# Patient Record
Sex: FEMALE | Race: WHITE | NOT HISPANIC OR LATINO | Employment: OTHER | URBAN - METROPOLITAN AREA
[De-identification: names, ages, dates, MRNs, and addresses within clinical notes are randomized per-mention and may not be internally consistent; named-entity substitution may affect disease eponyms.]

---

## 2017-02-17 ENCOUNTER — OFFICE VISIT (OUTPATIENT)
Dept: ORTHOPEDICS | Facility: CLINIC | Age: 82
End: 2017-02-17
Payer: MEDICARE

## 2017-02-17 ENCOUNTER — TELEPHONE (OUTPATIENT)
Dept: ORTHOPEDICS | Facility: CLINIC | Age: 82
End: 2017-02-17

## 2017-02-17 ENCOUNTER — HOSPITAL ENCOUNTER (OUTPATIENT)
Dept: RADIOLOGY | Facility: HOSPITAL | Age: 82
Discharge: HOME | End: 2017-02-17
Attending: ORTHOPAEDIC SURGERY
Payer: MEDICARE

## 2017-02-17 VITALS
SYSTOLIC BLOOD PRESSURE: 133 MMHG | HEART RATE: 63 BPM | HEIGHT: 59 IN | BODY MASS INDEX: 36.08 KG/M2 | DIASTOLIC BLOOD PRESSURE: 59 MMHG | WEIGHT: 179 LBS

## 2017-02-17 DIAGNOSIS — T84.84XA PAINFUL TOTAL KNEE REPLACEMENT, INITIAL ENCOUNTER: Primary | ICD-10-CM

## 2017-02-17 DIAGNOSIS — M25.562 LEFT KNEE PAIN, UNSPECIFIED CHRONICITY: Primary | ICD-10-CM

## 2017-02-17 DIAGNOSIS — Z96.659 PAINFUL TOTAL KNEE REPLACEMENT, INITIAL ENCOUNTER: Primary | ICD-10-CM

## 2017-02-17 DIAGNOSIS — M25.562 LEFT KNEE PAIN, UNSPECIFIED CHRONICITY: ICD-10-CM

## 2017-02-17 PROCEDURE — 73564 X-RAY EXAM KNEE 4 OR MORE: CPT | Mod: 26,LT,, | Performed by: RADIOLOGY

## 2017-02-17 PROCEDURE — 99213 OFFICE O/P EST LOW 20 MIN: CPT | Mod: PBBFAC,PN | Performed by: ORTHOPAEDIC SURGERY

## 2017-02-17 PROCEDURE — 73562 X-RAY EXAM OF KNEE 3: CPT | Mod: 26,59,RT, | Performed by: RADIOLOGY

## 2017-02-17 PROCEDURE — 99203 OFFICE O/P NEW LOW 30 MIN: CPT | Mod: S$PBB,,, | Performed by: ORTHOPAEDIC SURGERY

## 2017-02-17 PROCEDURE — 99999 PR PBB SHADOW E&M-EST. PATIENT-LVL III: CPT | Mod: PBBFAC,,, | Performed by: ORTHOPAEDIC SURGERY

## 2017-02-17 NOTE — TELEPHONE ENCOUNTER
----- Message from Nargis Rooney LPN sent at 2/17/2017  3:32 PM CST -----  Pt being referred to Dr. Guy by Dr. Rodarte for possible L tka revision. Please call pt to schedule. Thanks!

## 2017-02-17 NOTE — PROGRESS NOTES
Past Medical History   Diagnosis Date    Arthritis     Asthma      recent bronchits treated and resoved with Levaquin. Last Nov 2011.    Cataract      ou done//    Diabetes mellitus      diet controlled    GERD (gastroesophageal reflux disease)     Hypertension     Phlebitis and thrombophlebitis of the leg 2005 before       Past Surgical History   Procedure Laterality Date    Joint replacement       bilateral knee replacement    Cataract extraction       os d 11/6/13// od d 4/16/14//    Eye surgery       both eyes       Current Outpatient Prescriptions   Medication Sig    aspirin (ECOTRIN) 81 MG EC tablet Take 81 mg by mouth once daily.    hydrocodone-acetaminophen 5-325mg (NORCO) 5-325 mg per tablet Take 1 tablet by mouth every 4 (four) hours as needed for Pain.    lisinopril (PRINIVIL,ZESTRIL) 2.5 MG tablet Take 1 tablet (2.5 mg total) by mouth once daily.    metoprolol succinate (TOPROL-XL) 25 MG 24 hr tablet Take 1 tablet (25 mg total) by mouth once daily.    mometasone 0.1% (ELOCON) 0.1 % cream Apply topically once daily.     No current facility-administered medications for this visit.        Review of patient's allergies indicates:   Allergen Reactions    No known drug allergies        Family History   Problem Relation Age of Onset    Stroke Mother     Heart disease Father     Amblyopia Neg Hx     Blindness Neg Hx     Cancer Neg Hx     Cataracts Neg Hx     Diabetes Neg Hx     Glaucoma Neg Hx     Hypertension Neg Hx     Macular degeneration Neg Hx     Retinal detachment Neg Hx     Strabismus Neg Hx     Thyroid disease Neg Hx        Social History     Social History    Marital status:      Spouse name: N/A    Number of children: N/A    Years of education: N/A     Occupational History     Retired     Social History Main Topics    Smoking status: Former Smoker     Packs/day: 1.50     Years: 50.00     Types: Cigarettes     Quit date: 12/8/1990    Smokeless tobacco: Not on  "file    Alcohol use 8.4 oz/week     14 Glasses of wine per week      Comment: 1-3 glasses of wine daily    Drug use: No    Sexual activity: Not Currently     Other Topics Concern    Not on file     Social History Narrative       Chief Complaint:   Chief Complaint   Patient presents with    Left Knee - Pain       Consulting Physician: Referral, Self    History of present illness:    This is a 87 y.o. year old female who complains of left knee pain.  She states that she had a total knee done approximately 15 or more years ago on both sides but that the left one has been bothering her for the last few years.  She states it is getting worse.  She states the pain in her left knee is severe she is unable to walk on it.  She puts it at a 10 out of 10.  She uses a wheelchair at this time because her knee hurt so much.    Review of Systems:    Constitution: Denies chills, fever, and sweats.  HENT: Denies headaches or blurry vision.  Cardiovascular: Denies chest pain or irregular heart beat.  Respiratory: Denies cough or shortness of breath.  Gastrointestinal: Denies abdominal pain, nausea, or vomiting.  Musculoskeletal:  Denies muscle cramps.  Neurological: Denies dizziness or focal weakness.  Psychiatric/Behavioral: Normal mental status.  Hematologic/Lymphatic: Denies bleeding problem or easy bruising/bleeding.  Skin: Denies rash or suspicious lesions.    Examination:    Vital Signs:    Vitals:    02/17/17 1430   BP: (!) 133/59   Pulse: 63   Weight: 81.2 kg (179 lb)   Height: 4' 11" (1.499 m)   PainSc: 0-No pain   PainLoc: Knee       Body mass index is 36.15 kg/(m^2).    This a well-developed, well nourished patient in no acute distress.    Alert and oriented and cooperative to examination.       Physical Exam: Left Knee Exam    Gait   Antalgic    Skin  Rash:   None  Scars:   None    Inspection  Erythema:  None  Bruising:  None  Effusion:  None  Masses:  None  Lymphadenopathy: None    Range of Motion: 0 to " 90°    Medial Joint : y  Lateral Joint : y    Patellofemoral Tenderness: Yes  Patellofemoral Crepitus: Yes    Varus Stress:  Stable  Valgus Stress:  Stable    Strength:  5/5    Pulses:  Palpable  Sensation:  Intact          Imaging: X-rays ordered and reviewed today of the left knee show what appears to be a collapse of the medial femoral condyle throwing the knee into varus.        Assessment: Painful total knee replacement, initial encounter        Plan:  We discussed treatment options for her express to her that the best course of action would be a revision total knee arthroplasty.  We will send her down to [facility].  We did discuss braces and pain medicine and she declined that today.      DISCLAIMER: This note may have been dictated using voice recognition software and may contain grammatical errors.     NOTE: Consult report sent to referring provider via [facility] EMR.

## 2017-03-17 ENCOUNTER — TELEPHONE (OUTPATIENT)
Dept: FAMILY MEDICINE | Facility: CLINIC | Age: 82
End: 2017-03-17

## 2017-03-17 NOTE — TELEPHONE ENCOUNTER
Called to triage patient, for a swollen foot. Left voice mail message for her to return call to clinic, would like to make patient an appointment to be evaluated.

## 2017-03-17 NOTE — TELEPHONE ENCOUNTER
----- Message from Sung Colbert sent at 3/17/2017 11:56 AM CDT -----  Pt called stated that her left foot is swollen and want to know if she should take a fluid pill and if so will need a prescription/pls call back at [phone]

## 2017-03-24 ENCOUNTER — TELEPHONE (OUTPATIENT)
Dept: ENDOCRINOLOGY | Facility: CLINIC | Age: 82
End: 2017-03-24

## 2017-03-24 NOTE — TELEPHONE ENCOUNTER
----- Message from Cleveland Gilliam sent at 3/24/2017  2:25 PM CDT -----  Contact: Shannon Suazo- [phone]  Patient called asking for a new rx for fluid pills,legs are swollen. [facility]..Thanks!

## 2017-03-27 ENCOUNTER — DOCUMENTATION ONLY (OUTPATIENT)
Dept: FAMILY MEDICINE | Facility: CLINIC | Age: 82
End: 2017-03-27

## 2017-03-27 NOTE — TELEPHONE ENCOUNTER
Patient declined an 8 am visit as this was too early for her, she has bilateral foot swelling. It is not affecting her walking, but she would like a fluid pill.

## 2017-03-27 NOTE — PROGRESS NOTES
Pre-Visit Chart Review  For Appointment Scheduled on 3/30/17    Health Maintenance Due   Topic Date Due    DEXA SCAN  03/08/1969

## 2017-03-30 ENCOUNTER — TELEPHONE (OUTPATIENT)
Dept: FAMILY MEDICINE | Facility: CLINIC | Age: 82
End: 2017-03-30

## 2017-03-30 ENCOUNTER — OFFICE VISIT (OUTPATIENT)
Dept: FAMILY MEDICINE | Facility: CLINIC | Age: 82
End: 2017-03-30
Payer: MEDICARE

## 2017-03-30 VITALS
SYSTOLIC BLOOD PRESSURE: 119 MMHG | TEMPERATURE: 98 F | BODY MASS INDEX: 36.58 KG/M2 | HEIGHT: 59 IN | HEART RATE: 68 BPM | WEIGHT: 181.44 LBS | DIASTOLIC BLOOD PRESSURE: 59 MMHG

## 2017-03-30 DIAGNOSIS — M79.89 LEG SWELLING: Primary | ICD-10-CM

## 2017-03-30 DIAGNOSIS — R60.9 EDEMA, UNSPECIFIED TYPE: ICD-10-CM

## 2017-03-30 DIAGNOSIS — R23.0 BLUE TOES: ICD-10-CM

## 2017-03-30 PROCEDURE — 99999 PR PBB SHADOW E&M-EST. PATIENT-LVL III: CPT | Mod: PBBFAC,,, | Performed by: NURSE PRACTITIONER

## 2017-03-30 PROCEDURE — 99213 OFFICE O/P EST LOW 20 MIN: CPT | Mod: PBBFAC,PO | Performed by: NURSE PRACTITIONER

## 2017-03-30 PROCEDURE — 99213 OFFICE O/P EST LOW 20 MIN: CPT | Mod: S$PBB,,, | Performed by: NURSE PRACTITIONER

## 2017-03-30 RX ORDER — FUROSEMIDE 20 MG/1
TABLET ORAL
Qty: 15 TABLET | Refills: 0 | Status: SHIPPED | OUTPATIENT
Start: 2017-03-30 | End: 2017-07-19 | Stop reason: SDUPTHER

## 2017-03-30 NOTE — MR AVS SNAPSHOT
[facility]  [address]  [place] [address]  Phone: [phone]  Fax: [phone]                  Fracisco Pires   3/30/2017 2:00 PM   Office Visit    Description:  Female : 3/8/1929   Provider:  PRINCE Salas   Department:  [facility]           Reason for Visit     Foot Swelling           Diagnoses this Visit        Comments    Leg swelling    -  Primary     Blue toes         Edema, unspecified type                To Do List           Future Appointments        Provider Department Dept Phone    2017 12:00 PM [facility] US[facility] [phone]    2017 1:00 PM [facility] US1 [facility] [phone]      Goals (5 Years of Data)     None      Follow-Up and Disposition     Return if symptoms worsen or fail to improve.      [facility]     [facility] On Call Nurse Care Line -  Assistance  Unless otherwise directed by your provider, please contact [facility], our nurse care line that is available for  assistance.     Registered nurses in the [facility] provide: appointment scheduling, clinical advisement, health education, and other advisory services.  Call: [phone] (toll free)               Medications           Message regarding Medications     Verify the changes and/or additions to your medication regime listed below are the same as discussed with your clinician today.  If any of these changes or additions are incorrect, please notify your healthcare provider.             Verify that the below list of medications is an accurate representation of the medications you are currently taking.  If none reported, the list may be blank. If incorrect, please contact your healthcare provider. Carry this list with you in case of emergency.           Current Medications     aspirin (ECOTRIN) 81 MG EC tablet Take 81 mg by mouth once daily.    hydrocodone-acetaminophen 5-325mg (NORCO) 5-325 mg per tablet Take  "1 tablet by mouth every 4 (four) hours as needed for Pain.    lisinopril (PRINIVIL,ZESTRIL) 2.5 MG tablet Take 1 tablet (2.5 mg total) by mouth once daily.    metoprolol succinate (TOPROL-XL) 25 MG 24 hr tablet Take 1 tablet (25 mg total) by mouth once daily.    mometasone 0.1% (ELOCON) 0.1 % cream Apply topically once daily.           Clinical Reference Information           Your Vitals Were     BP Pulse Temp Height Weight Last Period    119/59 (BP Location: Left arm, Patient Position: Sitting, BP Method: Automatic) 68 98.2 °F (36.8 °C) (Oral) 4' 11" (1.499 m) 82.3 kg (181 lb 7 oz) 12/08/1978    BMI                36.65 kg/m2          Blood Pressure          Most Recent Value    BP  (!)  119/59      Allergies as of 3/30/2017     No Known Drug Allergies      Immunizations Administered on Date of Encounter - 3/30/2017     None      Orders Placed During Today's Visit     Future Labs/Procedures Expected by Expires    US Lower Extremity Arteries Bilateral  3/30/2017 3/30/2018    US Lower Extremity Veins Bilateral Insufficiency  3/30/2017 3/30/2018      [facility] Sign-Up     Activating your [facility] account is as easy as 1-2-3!     1) Visit [email or web address], select Sign Up Now, enter this activation code and your date of birth, then select Next.  Activation code not generated  Current Patient Portal Status: Account disabled      2) Create a username and password to use when you visit [facility] in the future and select a security question in case you lose your password and select Next.    3) Enter your e-mail address and click Sign Up!    Additional Information  If you have questions, please e-mail [email or web address] or call [phone] to talk to our [facility] staff. Remember, [facility] is NOT to be used for urgent needs. For medical emergencies, dial 911.         Language Assistance Services     ATTENTION: Language assistance services are available, free of charge. Please call [phone].      ATENCIÓN: Si habla " español, tiene a smith disposición servicios gratuitos de asistencia lingüística. Llthelma al [phone].     AMY Ý: N?u b?n nói Ti?ng Vi?t, có các d?ch v? h? tr? ngôn ng? mi?n phí dành cho b?n. G?i s? [phone].         [facility] complies with applicable Federal civil rights laws and does not discriminate on the basis of race, color, national origin, age, disability, or sex.

## 2017-03-30 NOTE — PROGRESS NOTES
Subjective:       Patient ID: Irmgard OLAMIDE Pires is a 88 y.o. female.    Chief Complaint: Foot Swelling    HPI Comments: Ms. Pires presents to the clinic today for bilateral leg swelling which has been present for three years.  She denies shortness of breath, chest pain.  She refuses to wear compression stockings.  She is sedentary.  She does not elevate the legs or eat a low salt diet.  Denies leg pain.  Labs six months ago show normal liver enzymes and BUN/CR.  Echo in June 2016 shows normal EF and diastolic dysfunction.  She is a former smoker.      Review of Systems   Constitutional: Negative for chills and fever.   Respiratory: Negative for cough, shortness of breath and wheezing.    Cardiovascular: Positive for leg swelling. Negative for chest pain.   Skin: Negative for rash and wound.       Objective:      Physical Exam   Constitutional: She is oriented to person, place, and time. She appears well-nourished. No distress.   HENT:   Head: Normocephalic and atraumatic.   Right Ear: External ear normal.   Left Ear: External ear normal.   Mouth/Throat: Oropharynx is clear and moist. No oropharyngeal exudate.   Eyes: Pupils are equal, round, and reactive to light. Right eye exhibits no discharge. Left eye exhibits no discharge.   Neck: Neck supple. No thyromegaly present.   Cardiovascular: Normal rate and regular rhythm.  Exam reveals no gallop and no friction rub.    No murmur heard.  Pulses:       Dorsalis pedis pulses are 2+ on the right side, and 1+ on the left side.   Bilateral lower extremity edema +1.  Toes of left foot are bluish tint.  Feet are warm.    Pulmonary/Chest: Effort normal and breath sounds normal. No respiratory distress. She has no wheezes. She has no rales.   Abdominal: Soft. She exhibits no distension. There is no tenderness.   Lymphadenopathy:     She has no cervical adenopathy.   Neurological: She is alert and oriented to person, place, and time. Coordination normal.   Skin: Skin is warm  and dry.   Psychiatric: She has a normal mood and affect. Her behavior is normal. Thought content normal.   Vitals reviewed.          Current Outpatient Prescriptions:     aspirin (ECOTRIN) 81 MG EC tablet, Take 81 mg by mouth once daily., Disp: , Rfl:     hydrocodone-acetaminophen 5-325mg (NORCO) 5-325 mg per tablet, Take 1 tablet by mouth every 4 (four) hours as needed for Pain., Disp: 12 tablet, Rfl: 0    lisinopril (PRINIVIL,ZESTRIL) 2.5 MG tablet, Take 1 tablet (2.5 mg total) by mouth once daily., Disp: 30 tablet, Rfl: 6    metoprolol succinate (TOPROL-XL) 25 MG 24 hr tablet, Take 1 tablet (25 mg total) by mouth once daily., Disp: 30 tablet, Rfl: 5    furosemide (LASIX) 20 MG tablet, Take one pill every other day as needed for leg swelling., Disp: 15 tablet, Rfl: 0    mometasone 0.1% (ELOCON) 0.1 % cream, Apply topically once daily., Disp: 45 g, Rfl: 2  Assessment:       1. Leg swelling    2. Blue toes    3. Edema, unspecified type         Plan:     Leg swelling  Elevate legs. Low salt diet.  -     US Lower Extremity Veins Bilateral Insufficiency; Future; Expected date: 3/30/17  -     furosemide (LASIX) 20 MG tablet; Take one pill every other day as needed for leg swelling.  Dispense: 15 tablet; Refill: 0    Blue toes  -      Lower Extremity Arteries Bilateral; Future; Expected date: 3/30/17    Edema, unspecified type   -      Lower Extremity Veins Bilateral Insufficiency; Future; Expected date: 3/30/17

## 2017-03-30 NOTE — TELEPHONE ENCOUNTER
----- Message from Sung Colbert sent at 3/30/2017  4:43 PM CDT -----  Pt called stated that she's asking for a medication for fluid/[facility] [address]/pls call back at [phone]

## 2017-04-10 ENCOUNTER — HOSPITAL ENCOUNTER (OUTPATIENT)
Dept: RADIOLOGY | Facility: CLINIC | Age: 82
Discharge: HOME | End: 2017-04-10
Attending: NURSE PRACTITIONER
Payer: MEDICARE

## 2017-04-10 DIAGNOSIS — R23.0 BLUE TOES: ICD-10-CM

## 2017-04-10 DIAGNOSIS — M79.89 LEG SWELLING: ICD-10-CM

## 2017-04-10 DIAGNOSIS — R60.9 EDEMA, UNSPECIFIED TYPE: ICD-10-CM

## 2017-04-10 PROCEDURE — 93922 UPR/L XTREMITY ART 2 LEVELS: CPT | Mod: 26,,, | Performed by: RADIOLOGY

## 2017-04-10 PROCEDURE — 93970 EXTREMITY STUDY: CPT | Mod: 26,,, | Performed by: RADIOLOGY

## 2017-04-10 PROCEDURE — 93925 LOWER EXTREMITY STUDY: CPT | Mod: 26,,, | Performed by: RADIOLOGY

## 2017-04-10 PROCEDURE — 93925 LOWER EXTREMITY STUDY: CPT | Mod: TC,PO

## 2017-04-11 ENCOUNTER — DOCUMENTATION ONLY (OUTPATIENT)
Dept: FAMILY MEDICINE | Facility: CLINIC | Age: 82
End: 2017-04-11

## 2017-04-11 NOTE — PROGRESS NOTES
Pre-Visit Chart Review  For Appointment Scheduled on 4/18/17    Health Maintenance Due   Topic Date Due    DEXA SCAN  03/08/1969

## 2017-04-17 NOTE — PATIENT INSTRUCTIONS
Take lasix tomorrow morning  Take blood pressure 3 times throughout the day tomorrow.    Established High Blood Pressure    High blood pressure (hypertension) is a chronic disease. Often health care providers dont know what causes it. But it can be caused by certain health conditions and medicines.  If you have high blood pressure, you may not have any symptoms. If you do have symptoms, they may include headache, dizziness, changes in your vision, chest pain, and shortness of breath. But even without symptoms, high blood pressure thats not treated raises your risk for heart attack and stroke. High blood pressure is a serious health risk and shouldnt be ignored.  A blood pressure reading is made up of two numbers: a higher number over a lower number. The top number is the systolic pressure. The bottom number is the diastolic pressure. A normal blood pressure is less than 120 over less than 80.  High blood pressure is when either the top number is 140 or higher, or the bottom number is 90 or higher. This must be the result when taking your blood pressure a number of times. The blood pressures between normal and high are called prehypertension.  Home care  If you have high blood pressure, you should do what is listed below to lower your blood pressure. If you are taking medicines for high blood pressure, these methods may reduce or end your need for medicines in the future.  · Begin a weight-loss program if you are overweight.  · Cut back on how much salt you get in your diet. Heres how to do this:  ¨ Dont eat foods that have a lot of salt. These include olives, pickles, smoked meats, and salted potato chips.  ¨ Dont add salt to your food at the table.  ¨ Use only small amounts of salt when cooking.  · Begin an exercise program. Talk with your health care provider about the type of exercise program that would be best for you. It doesn't have to be hard. Even brisk walking for 20 minutes 3 times a week is a good  form of exercise.  · Dont take medicines that have heart stimulants. This includes many cold and sinus decongestant pills and sprays, as well as diet pills. Check the warnings about hypertension on the label. Stimulants such as amphetamine or cocaine could be lethal for someone with high blood pressure. Never take these.  · Limit how much caffeine you get in your diet. Switch to caffeine-free products.  · Stop smoking. If you are a long-time smoker, this can be hard. Enroll in a stop-smoking program to make it more likely that you will quit for good.  · Learn how to handle stress. This is an important part of any program to lower blood pressure. Learn about relaxation methods like meditation, yoga, or biofeedback.  · If your provider prescribed medicines, take them exactly as directed. Missing doses may cause your blood pressure get out of control.  · Consider buying an automatic blood pressure machine. You can get one of these at most pharmacies. Use this to watch your blood pressure at home. Give the results to your provider.  Follow-up care  You will need to make regular visits to your health care provider. This is to check your blood pressure and to make changes to your medicines. Make a follow-up appointment as directed.  When to seek medical advice  Call your health care provider right away if any of these occur:  · Chest pain or shortness of breath  · Severe headache  · Throbbing or rushing sound in the ears  · Nosebleed  · Sudden severe pain in your belly (abdomen)  · Extreme drowsiness, confusion, or fainting  · Dizziness or dizziness with a spinning sensation (vertigo)  · Weakness of an arm or leg or one side of the face  · You have problems speaking or seeing   Date Last Reviewed: 11/25/2014  © 5940-2710 [employer]. [address] [place] [address], [place] [address]. All rights reserved. This information is not intended as a substitute for professional medical care. Always follow your healthcare  professional's instructions.            Walking for Fitness  Fitness walking has something for everyone, even people who are already fit. Walking is one of the safest ways to condition your body aerobically. It can boost energy, help you lose weight, and reduce stress.    Physical benefits  · Walking strengthens your heart and lungs, and tones your muscles.  · When walking, your feet land with less impact than in other sports. This reduces chances of muscle, bone, and joint injury.  · Regular walking improves your cholesterol levels and lowers your risk of heart disease. And it helps you control your blood sugar if you have diabetes.  · Walking is a weight-bearing activity, which helps maintain bone density. This can help prevent osteoporosis.  Personal rewards  · Taking walks can help you relax and manage stress. And fitness walking may make you feel better about yourself.  · Walking can help you sleep better at night and make you less likely to be depressed.  · Regular walking may help maintain your memory as you get older.  · Walking is a great way to spend extra time with friends and family members. Be sure to invite your dog along!  Q&A about fitness walking  Q: Will walking keep me fit?  A: Yes. Regular walking at the right pace gives you all the benefits of other aerobic activities, such as jogging and swimming.  Q: Will walking help me lose weight and keep it off?  A: Yes. Per mile, walking can burn as many calories as jogging. Your health care provider can help work walking into your weight-loss plan.  Q: Is walking safe for my health?  A: Yes. Walking is safe if you have high blood pressure, diabetes, heart disease, or other conditions. Talk to your health care provider before you start.  Date Last Reviewed: 5/9/2015  © 8972-2162 [employer]. [address] [place] [address], [place] [address]. All rights reserved. This information is not intended as a substitute for professional medical care. Always  follow your healthcare professional's instructions.            Weight Management: Getting Started  Healthy bodies come in all shapes and sizes. Not all bodies are made to be thin. For some people, a healthy weight is higher than the average weight listed on weight charts. Your healthcare provider can help you decide on a healthy weight for you.    Reasons to lose weight  Losing weight can help with some health problems, such as high blood pressure, heart disease, diabetes, sleep apnea, and arthritis. You may also feel more energy.  Set your long-term goal  Your goal doesn't even have to be a specific weight. You may decide on a fitness goal (such as being able to walk 10 miles a week), or a health goal (such as lowering your blood pressure). Choose a goal that is measurable and reasonable, so you know when you've reached it. A goal of reaching a BMI of less than 25 is not always reasonable (or possible).   Make an action plan  Habits dont change overnight. Setting your goals too high can leave you feeling discouraged if you cant reach them. Be realistic. Choose one or two small changes you can make now. Set an action plan for how you are going to make these changes. When you can stick to this plan, keep making a few more small changes. Taking small steps will help you stay on the path to success.  Track your progress  Write down your goals. Then, keep a daily record of your progress. Write down what you eat and how active you are. This record lets you look back on how much youve done. It may also help when youre feeling frustrated. Reward yourself for success. Even if you dont reach every goal, give yourself credit for what you do get done.  Get support  Encouragement from others can help make losing weight easier. Ask your family members and friends for support. They may even want to join you. Also look to your healthcare provider, registered dietitian, and  for help. Your local hospital  can give you more information about nutrition, exercise, and weight loss.  Date Last Reviewed: 1/31/2016  © 3255-6657 [employer]. [address] [place] [address], [place] [address]. All rights reserved. This information is not intended as a substitute for professional medical care. Always follow your healthcare professional's instructions.

## 2017-04-18 ENCOUNTER — LAB VISIT (OUTPATIENT)
Dept: LAB | Facility: HOSPITAL | Age: 82
End: 2017-04-18
Attending: FAMILY MEDICINE
Payer: MEDICARE

## 2017-04-18 ENCOUNTER — OFFICE VISIT (OUTPATIENT)
Dept: FAMILY MEDICINE | Facility: CLINIC | Age: 82
End: 2017-04-18
Payer: MEDICARE

## 2017-04-18 VITALS
HEART RATE: 69 BPM | HEIGHT: 59 IN | SYSTOLIC BLOOD PRESSURE: 111 MMHG | DIASTOLIC BLOOD PRESSURE: 59 MMHG | OXYGEN SATURATION: 96 % | BODY MASS INDEX: 37.91 KG/M2 | RESPIRATION RATE: 14 BRPM | WEIGHT: 188.06 LBS

## 2017-04-18 DIAGNOSIS — I73.9 PVD (PERIPHERAL VASCULAR DISEASE): Chronic | ICD-10-CM

## 2017-04-18 DIAGNOSIS — I10 ESSENTIAL HYPERTENSION: Chronic | ICD-10-CM

## 2017-04-18 DIAGNOSIS — M79.89 SWELLING OF LOWER EXTREMITY: Primary | ICD-10-CM

## 2017-04-18 DIAGNOSIS — M79.89 SWELLING OF LOWER EXTREMITY: ICD-10-CM

## 2017-04-18 LAB
ALBUMIN SERPL BCP-MCNC: 3.2 G/DL
ALP SERPL-CCNC: 80 U/L
ALT SERPL W/O P-5'-P-CCNC: 12 U/L
ANION GAP SERPL CALC-SCNC: 9 MMOL/L
AST SERPL-CCNC: 24 U/L
BASOPHILS # BLD AUTO: 0.04 K/UL
BASOPHILS NFR BLD: 0.7 %
BILIRUB SERPL-MCNC: 0.7 MG/DL
BNP SERPL-MCNC: 152 PG/ML
BUN SERPL-MCNC: 21 MG/DL
CALCIUM SERPL-MCNC: 9.5 MG/DL
CHLORIDE SERPL-SCNC: 106 MMOL/L
CO2 SERPL-SCNC: 23 MMOL/L
CREAT SERPL-MCNC: 0.8 MG/DL
DIFFERENTIAL METHOD: ABNORMAL
EOSINOPHIL # BLD AUTO: 0.2 K/UL
EOSINOPHIL NFR BLD: 4 %
ERYTHROCYTE [DISTWIDTH] IN BLOOD BY AUTOMATED COUNT: 14.2 %
EST. GFR  (AFRICAN AMERICAN): >60 ML/MIN/1.73 M^2
EST. GFR  (NON AFRICAN AMERICAN): >60 ML/MIN/1.73 M^2
GLUCOSE SERPL-MCNC: 94 MG/DL
HCT VFR BLD AUTO: 40.3 %
HGB BLD-MCNC: 13.5 G/DL
LYMPHOCYTES # BLD AUTO: 2.4 K/UL
LYMPHOCYTES NFR BLD: 39.5 %
MCH RBC QN AUTO: 31.7 PG
MCHC RBC AUTO-ENTMCNC: 33.5 %
MCV RBC AUTO: 95 FL
MONOCYTES # BLD AUTO: 0.7 K/UL
MONOCYTES NFR BLD: 12.3 %
NEUTROPHILS # BLD AUTO: 2.6 K/UL
NEUTROPHILS NFR BLD: 43.3 %
PLATELET # BLD AUTO: 214 K/UL
PMV BLD AUTO: 10.3 FL
POTASSIUM SERPL-SCNC: 4.5 MMOL/L
PROT SERPL-MCNC: 7.6 G/DL
RBC # BLD AUTO: 4.26 M/UL
SODIUM SERPL-SCNC: 138 MMOL/L
WBC # BLD AUTO: 5.95 K/UL

## 2017-04-18 PROCEDURE — 85025 COMPLETE CBC W/AUTO DIFF WBC: CPT

## 2017-04-18 PROCEDURE — 36415 COLL VENOUS BLD VENIPUNCTURE: CPT | Mod: PO

## 2017-04-18 PROCEDURE — 83880 ASSAY OF NATRIURETIC PEPTIDE: CPT

## 2017-04-18 PROCEDURE — 99999 PR PBB SHADOW E&M-EST. PATIENT-LVL III: CPT | Mod: PBBFAC,,, | Performed by: PHYSICIAN ASSISTANT

## 2017-04-18 PROCEDURE — 80053 COMPREHEN METABOLIC PANEL: CPT

## 2017-04-18 PROCEDURE — 99213 OFFICE O/P EST LOW 20 MIN: CPT | Mod: S$PBB,,, | Performed by: PHYSICIAN ASSISTANT

## 2017-04-18 NOTE — PROGRESS NOTES
Subjective:       Patient ID: Fracisco Pires is a 88 y.o. female.    Chief Complaint: Follow-up (2wk f/u )    HPI Comments: Ms. Pires comes to clinic today for follow up of leg swelling. The patient was started on lasix 20mg every other day on March 30. The patient reports she is not elevating her legs. The patient refuses to wear compression stockings. The patient reports she often does not take her lasix as it causes her to urinate too frequently. The patient recently had ultrasound of veins are arteries of legs. No abnormality was found.     Review of Systems   Constitutional: Negative for activity change, appetite change and fever.   HENT: Negative for postnasal drip, rhinorrhea and sinus pressure.    Eyes: Negative for visual disturbance.   Respiratory: Negative for cough and shortness of breath.    Cardiovascular: Positive for leg swelling. Negative for chest pain.   Gastrointestinal: Negative for abdominal distention and abdominal pain.   Neurological: Negative for headaches.   Hematological: Negative for adenopathy.   Psychiatric/Behavioral: The patient is not nervous/anxious.        Objective:      Physical Exam   Constitutional: She is oriented to person, place, and time.   Cardiovascular: Normal rate and regular rhythm.    Pulmonary/Chest: Effort normal and breath sounds normal. She has no wheezes.   Abdominal: Soft. Bowel sounds are normal. There is no tenderness.   Musculoskeletal: She exhibits edema.   Bilateral distal lower extremity edema   Neurological: She is alert and oriented to person, place, and time.   Skin: No erythema.   Psychiatric: Her behavior is normal.       Assessment:       1. Swelling of lower extremity    2. PVD (peripheral vascular disease)    3. Essential hypertension        Plan:   Fracisco was seen today for follow-up.    Diagnoses and all orders for this visit:    Swelling of lower extremity  -     Comprehensive metabolic panel; Future  -     Brain natriuretic peptide;  Future  -     CBC auto differential; Future  Elevate legs  Patient encouraged to take lasix tomorrow to see how this effects leg swelling.   PVD (peripheral vascular disease)  -     Comprehensive metabolic panel; Future  -     Brain natriuretic peptide; Future  -     CBC auto differential; Future    Essential hypertension  -     Comprehensive metabolic panel; Future  -     Brain natriuretic peptide; Future  -     CBC auto differential; Future  Blood pressure is on the low end. Patient to monitor blood pressure and record readings as diuretic may cause blood pressure to decrease.

## 2017-04-18 NOTE — MR AVS SNAPSHOT
[facility]  [address]  [place] [address]  Phone: [phone]  Fax: [phone]                  Fracisco Pires   2017 2:00 PM   Office Visit    Description:  Female : 3/8/1929   Provider:  Bianca Owusu PA-C   Department:  [facility]           Reason for Visit     Follow-up           Diagnoses this Visit        Comments    Swelling of lower extremity    -  Primary     PVD (peripheral vascular disease)         Essential hypertension                To Do List           Future Appointments        Provider Department Dept Phone    2017 3:00 PM [facility] [phone]    2017 2:00 PM Marquis Brush MD [facility] [phone]      Goals (5 Years of Data)     None      [facility]     [facility]  [facility]istance  Unless otherwise directed by your provider, please contact [facility], our nurse care line that is available for  assistance.     Registered nurses in the [facility] provide: appointment scheduling, clinical advisement, health education, and other advisory services.  Call: [phone] (toll free)               Medications           Message regarding Medications     Verify the changes and/or additions to your medication regime listed below are the same as discussed with your clinician today.  If any of these changes or additions are incorrect, please notify your healthcare provider.             Verify that the below list of medications is an accurate representation of the medications you are currently taking.  If none reported, the list may be blank. If incorrect, please contact your healthcare provider. Carry this list with you in case of emergency.           Current Medications     aspirin (ECOTRIN) 81 MG EC tablet Take 81 mg by mouth once daily.    furosemide (LASIX) 20 MG tablet Take one pill every other day as needed for leg swelling.     "hydrocodone-acetaminophen 5-325mg (NORCO) 5-325 mg per tablet Take 1 tablet by mouth every 4 (four) hours as needed for Pain.    lisinopril (PRINIVIL,ZESTRIL) 2.5 MG tablet Take 1 tablet (2.5 mg total) by mouth once daily.    metoprolol succinate (TOPROL-XL) 25 MG 24 hr tablet Take 1 tablet (25 mg total) by mouth once daily.    mometasone 0.1% (ELOCON) 0.1 % cream Apply topically once daily.           Clinical Reference Information           Your Vitals Were     BP Pulse Resp Height Weight Last Period    111/59 (BP Location: Right arm, Patient Position: Sitting, BP Method: Automatic) 69 14 4' 11" (1.499 m) 85.3 kg (188 lb 0.8 oz) 12/08/1978    SpO2 BMI             96% 37.98 kg/m2         Blood Pressure          Most Recent Value    BP  (!)  111/59      Allergies as of 4/18/2017     No Known Drug Allergies      Immunizations Administered on Date of Encounter - 4/18/2017     None      Orders Placed During Today's Visit     Future Labs/Procedures Expected by Expires    Brain natriuretic peptide  4/18/2017 6/17/2018    CBC auto differential  4/18/2017 6/17/2018    Comprehensive metabolic panel  4/18/2017 6/17/2018      Instructions    Take lasix tomorrow morning  Take blood pressure 3 times throughout the day tomorrow.    Established High Blood Pressure    High blood pressure (hypertension) is a chronic disease. Often health care providers dont know what causes it. But it can be caused by certain health conditions and medicines.  If you have high blood pressure, you may not have any symptoms. If you do have symptoms, they may include headache, dizziness, changes in your vision, chest pain, and shortness of breath. But even without symptoms, high blood pressure thats not treated raises your risk for heart attack and stroke. High blood pressure is a serious health risk and shouldnt be ignored.  A blood pressure reading is made up of two numbers: a higher number over a lower number. The top number is the systolic " pressure. The bottom number is the diastolic pressure. A normal blood pressure is less than 120 over less than 80.  High blood pressure is when either the top number is 140 or higher, or the bottom number is 90 or higher. This must be the result when taking your blood pressure a number of times. The blood pressures between normal and high are called prehypertension.  Home care  If you have high blood pressure, you should do what is listed below to lower your blood pressure. If you are taking medicines for high blood pressure, these methods may reduce or end your need for medicines in the future.  · Begin a weight-loss program if you are overweight.  · Cut back on how much salt you get in your diet. Heres how to do this:  ¨ Dont eat foods that have a lot of salt. These include olives, pickles, smoked meats, and salted potato chips.  ¨ Dont add salt to your food at the table.  ¨ Use only small amounts of salt when cooking.  · Begin an exercise program. Talk with your health care provider about the type of exercise program that would be best for you. It doesn't have to be hard. Even brisk walking for 20 minutes 3 times a week is a good form of exercise.  · Dont take medicines that have heart stimulants. This includes many cold and sinus decongestant pills and sprays, as well as diet pills. Check the warnings about hypertension on the label. Stimulants such as amphetamine or cocaine could be lethal for someone with high blood pressure. Never take these.  · Limit how much caffeine you get in your diet. Switch to caffeine-free products.  · Stop smoking. If you are a long-time smoker, this can be hard. Enroll in a stop-smoking program to make it more likely that you will quit for good.  · Learn how to handle stress. This is an important part of any program to lower blood pressure. Learn about relaxation methods like meditation, yoga, or biofeedback.  · If your provider prescribed medicines, take them exactly as  directed. Missing doses may cause your blood pressure get out of control.  · Consider buying an automatic blood pressure machine. You can get one of these at most pharmacies. Use this to watch your blood pressure at home. Give the results to your provider.  Follow-up care  You will need to make regular visits to your health care provider. This is to check your blood pressure and to make changes to your medicines. Make a follow-up appointment as directed.  When to seek medical advice  Call your health care provider right away if any of these occur:  · Chest pain or shortness of breath  · Severe headache  · Throbbing or rushing sound in the ears  · Nosebleed  · Sudden severe pain in your belly (abdomen)  · Extreme drowsiness, confusion, or fainting  · Dizziness or dizziness with a spinning sensation (vertigo)  · Weakness of an arm or leg or one side of the face  · You have problems speaking or seeing   Date Last Reviewed: 11/25/2014  © 1143-2433 [employer]. [address] [place] [address], [place] [address]. All rights reserved. This information is not intended as a substitute for professional medical care. Always follow your healthcare professional's instructions.            Walking for Fitness  Fitness walking has something for everyone, even people who are already fit. Walking is one of the safest ways to condition your body aerobically. It can boost energy, help you lose weight, and reduce stress.    Physical benefits  · Walking strengthens your heart and lungs, and tones your muscles.  · When walking, your feet land with less impact than in other sports. This reduces chances of muscle, bone, and joint injury.  · Regular walking improves your cholesterol levels and lowers your risk of heart disease. And it helps you control your blood sugar if you have diabetes.  · Walking is a weight-bearing activity, which helps maintain bone density. This can help prevent osteoporosis.  Personal rewards  · Taking walks  can help you relax and manage stress. And fitness walking may make you feel better about yourself.  · Walking can help you sleep better at night and make you less likely to be depressed.  · Regular walking may help maintain your memory as you get older.  · Walking is a great way to spend extra time with friends and family members. Be sure to invite your dog along!  Q&A about fitness walking  Q: Will walking keep me fit?  A: Yes. Regular walking at the right pace gives you all the benefits of other aerobic activities, such as jogging and swimming.  Q: Will walking help me lose weight and keep it off?  A: Yes. Per mile, walking can burn as many calories as jogging. Your health care provider can help work walking into your weight-loss plan.  Q: Is walking safe for my health?  A: Yes. Walking is safe if you have high blood pressure, diabetes, heart disease, or other conditions. Talk to your health care provider before you start.  Date Last Reviewed: 5/9/2015 © 2000-2016 [employer]. [address] [place] [address]. All rights reserved. This information is not intended as a substitute for professional medical care. Always follow your healthcare professional's instructions.            Weight Management: Getting Started  Healthy bodies come in all shapes and sizes. Not all bodies are made to be thin. For some people, a healthy weight is higher than the average weight listed on weight charts. Your healthcare provider can help you decide on a healthy weight for you.    Reasons to lose weight  Losing weight can help with some health problems, such as high blood pressure, heart disease, diabetes, sleep apnea, and arthritis. You may also feel more energy.  Set your long-term goal  Your goal doesn't even have to be a specific weight. You may decide on a fitness goal (such as being able to walk 10 miles a week), or a health goal (such as lowering your blood pressure). Choose a goal that is measurable and  reasonable, so you know when you've reached it. A goal of reaching a BMI of less than 25 is not always reasonable (or possible).   Make an action plan  Habits dont change overnight. Setting your goals too high can leave you feeling discouraged if you cant reach them. Be realistic. Choose one or two small changes you can make now. Set an action plan for how you are going to make these changes. When you can stick to this plan, keep making a few more small changes. Taking small steps will help you stay on the path to success.  Track your progress  Write down your goals. Then, keep a daily record of your progress. Write down what you eat and how active you are. This record lets you look back on how much youve done. It may also help when youre feeling frustrated. Reward yourself for success. Even if you dont reach every goal, give yourself credit for what you do get done.  Get support  Encouragement from others can help make losing weight easier. Ask your family members and friends for support. They may even want to join you. Also look to your healthcare provider, registered dietitian, and  for help. Your local hospital can give you more information about nutrition, exercise, and weight loss.  Date Last Reviewed: 1/31/2016  © 6047-9091 [employer]. [address] [place] [address], [place] [address]. All rights reserved. This information is not intended as a substitute for professional medical care. Always follow your healthcare professional's instructions.               Language Assistance Services     ATTENTION: Language assistance services are available, free of charge. Please call [phone].      ATENCIÓN: Si habla español, tiene a smith disposición servicios gratuitos de asistencia lingüística. Llame al [phone].     CHÚ Ý: N?u b?n nói Ti?ng Vi?t, có các d?ch v? h? tr? ngôn ng? mi?n phí dành cho b?n. G?i s? [phone].         [facility] complies with  applicable Federal civil rights laws and does not discriminate on the basis of race, color, national origin, age, disability, or sex.

## 2017-04-19 DIAGNOSIS — M79.89 LEG SWELLING: Primary | ICD-10-CM

## 2017-04-19 DIAGNOSIS — R79.89 ELEVATED BRAIN NATRIURETIC PEPTIDE (BNP) LEVEL: ICD-10-CM

## 2017-04-30 RX ORDER — METOPROLOL SUCCINATE 25 MG/1
TABLET, EXTENDED RELEASE ORAL
Qty: 30 TABLET | Refills: 0 | Status: SHIPPED | OUTPATIENT
Start: 2017-04-30 | End: 2017-05-03 | Stop reason: SDUPTHER

## 2017-05-03 RX ORDER — LISINOPRIL 2.5 MG/1
TABLET ORAL
Qty: 30 TABLET | Refills: 0 | Status: SHIPPED | OUTPATIENT
Start: 2017-05-03 | End: 2017-05-29 | Stop reason: SDUPTHER

## 2017-05-03 RX ORDER — METOPROLOL SUCCINATE 25 MG/1
TABLET, EXTENDED RELEASE ORAL
Qty: 30 TABLET | Refills: 0 | Status: SHIPPED | OUTPATIENT
Start: 2017-05-03 | End: 2017-07-31 | Stop reason: SDUPTHER

## 2017-05-08 ENCOUNTER — TELEPHONE (OUTPATIENT)
Dept: FAMILY MEDICINE | Facility: CLINIC | Age: 82
End: 2017-05-08

## 2017-05-08 DIAGNOSIS — M79.604 LEG PAIN, BILATERAL: Primary | ICD-10-CM

## 2017-05-08 DIAGNOSIS — M25.569 KNEE PAIN, UNSPECIFIED CHRONICITY, UNSPECIFIED LATERALITY: ICD-10-CM

## 2017-05-08 DIAGNOSIS — M79.605 LEG PAIN, BILATERAL: Primary | ICD-10-CM

## 2017-05-08 NOTE — TELEPHONE ENCOUNTER
Patient should consider compression stockings if she is not willing to elevate her legs. I can place orders for xray of knees and hips to evaluate these area.

## 2017-05-08 NOTE — TELEPHONE ENCOUNTER
Spoke to patient. She reports that she is having leg and knee pain every time she tries to walk. This is an ongoing problem but reports that this is getting worse. Patient denies an increase in lower extremity edema and new injury. I inquired if she is elevating her legs at all during the day and she patient reports that she is not doing this at all as be is not that type of person. States that she is up trying to move around all of the time. I strongly advised patient to start elevating her legs during the day as she has been directed to alleviate some of the PVD symptoms she experiences. Patient verbalized understanding but said she just does not like to do that. I then told the patient that sometimes we need to adjust our daily schedules and routines to get some relief pain from the pain caused by disease processes. Patient verbalized understanding and states that she will try to elevate her legs.   Please advise of any further advice or recommendations for patient. She has an OV scheduled to see DR Brush on 5/19/17.

## 2017-05-08 NOTE — TELEPHONE ENCOUNTER
----- Message from Dulce Mccarty sent at 5/5/2017 10:28 AM CDT -----  Contact: self  States she is having pain every time she takes a step.  Please call back at [phone] (home)

## 2017-05-09 NOTE — TELEPHONE ENCOUNTER
"Spoke to patient. Advised that she should try compression stockings if she does not want to elevate her legs. Patient stated" A doctor told me that about 20 years ago. I just can't do that. It is out of the question".  Patient declined x-rays as well. Stated "Those things have already been done as well and did not help anything".   I urged patient to elevate her legs as we discussed on yesterday. She reports that she has taken ibuprofen today to help with her discomfort and will let us know if this helps with her leg pain.  "

## 2017-05-16 ENCOUNTER — DOCUMENTATION ONLY (OUTPATIENT)
Dept: FAMILY MEDICINE | Facility: CLINIC | Age: 82
End: 2017-05-16

## 2017-05-16 NOTE — PROGRESS NOTES
05/19/2017    Health Maintenance Due   Topic Date Due    TETANUS VACCINE  03/08/1947    DEXA SCAN  03/08/1969    Zoster Vaccine  03/08/1989

## 2017-05-18 ENCOUNTER — TELEPHONE (OUTPATIENT)
Dept: FAMILY MEDICINE | Facility: CLINIC | Age: 82
End: 2017-05-18

## 2017-05-18 NOTE — TELEPHONE ENCOUNTER
----- Message from Ambrosio Harvey sent at 5/18/2017  1:52 PM CDT -----  Contact: Fracisco  Please call back with another appointment. cannot make the appointment-- forgot about it so I canceled. Call back today please [phone] (home)

## 2017-05-29 ENCOUNTER — TELEPHONE (OUTPATIENT)
Dept: FAMILY MEDICINE | Facility: CLINIC | Age: 82
End: 2017-05-29

## 2017-05-29 RX ORDER — LISINOPRIL 2.5 MG/1
2.5 TABLET ORAL DAILY
Qty: 30 TABLET | Refills: 0 | Status: SHIPPED | OUTPATIENT
Start: 2017-05-29 | End: 2017-09-06 | Stop reason: SDUPTHER

## 2017-05-29 NOTE — TELEPHONE ENCOUNTER
----- Message from Leticia Nargis sent at 5/29/2017 10:58 AM CDT -----  Contact:  - Jessica  Patient needs a refill on her lisinopril 2.5 mg.       [id number] [place] - [address]  [address]  [place] [address]  Phone: [phone] Fax: [phone]    Please call patient back at [phone].     Thank you.

## 2017-06-07 ENCOUNTER — HOSPITAL ENCOUNTER (OUTPATIENT)
Dept: RADIOLOGY | Facility: HOSPITAL | Age: 82
Discharge: HOME | End: 2017-06-07
Attending: FAMILY MEDICINE
Payer: MEDICARE

## 2017-06-07 ENCOUNTER — OFFICE VISIT (OUTPATIENT)
Dept: ORTHOPEDICS | Facility: CLINIC | Age: 82
End: 2017-06-07
Payer: MEDICARE

## 2017-06-07 VITALS — WEIGHT: 188 LBS | BODY MASS INDEX: 37.9 KG/M2 | HEIGHT: 59 IN

## 2017-06-07 DIAGNOSIS — M79.605 LEG PAIN, BILATERAL: ICD-10-CM

## 2017-06-07 DIAGNOSIS — T84.84XA PAINFUL TOTAL KNEE REPLACEMENT, INITIAL ENCOUNTER: Primary | ICD-10-CM

## 2017-06-07 DIAGNOSIS — M79.604 LEG PAIN, BILATERAL: ICD-10-CM

## 2017-06-07 DIAGNOSIS — Z96.659 PAINFUL TOTAL KNEE REPLACEMENT, INITIAL ENCOUNTER: Primary | ICD-10-CM

## 2017-06-07 DIAGNOSIS — Z96.659 LOOSE TOTAL KNEE ARTHROPLASTY, INITIAL ENCOUNTER: ICD-10-CM

## 2017-06-07 DIAGNOSIS — T84.038A LOOSE TOTAL KNEE ARTHROPLASTY, INITIAL ENCOUNTER: ICD-10-CM

## 2017-06-07 DIAGNOSIS — M25.569 KNEE PAIN, UNSPECIFIED CHRONICITY, UNSPECIFIED LATERALITY: ICD-10-CM

## 2017-06-07 PROCEDURE — 99212 OFFICE O/P EST SF 10 MIN: CPT | Mod: PBBFAC,25,PN | Performed by: ORTHOPAEDIC SURGERY

## 2017-06-07 PROCEDURE — 99214 OFFICE O/P EST MOD 30 MIN: CPT | Mod: S$PBB,,, | Performed by: ORTHOPAEDIC SURGERY

## 2017-06-07 PROCEDURE — 1159F MED LIST DOCD IN RCRD: CPT | Mod: ,,, | Performed by: ORTHOPAEDIC SURGERY

## 2017-06-07 PROCEDURE — 99999 PR PBB SHADOW E&M-EST. PATIENT-LVL II: CPT | Mod: PBBFAC,,, | Performed by: ORTHOPAEDIC SURGERY

## 2017-06-07 PROCEDURE — 73564 X-RAY EXAM KNEE 4 OR MORE: CPT | Mod: 26,50,, | Performed by: RADIOLOGY

## 2017-06-08 NOTE — PROGRESS NOTES
DATE: 6/7/2017  PATIENT: Fracisco Pires  REFERRING MD:  CHIEF COMPLAINT:   Chief Complaint   Patient presents with    Left Knee - Pain    Right Knee - Pain       HISTORY:  Fracisco Pires is a 88 y.o. female  who presents for initial evaluation of bilateral knee pain.  States she underwent bilateral total knee arthroplasties by Dr. [facility] 12 years ago.  She did well until a few years when her knee slowly started getting worse.  Left is worse than the right.  She states she cannot ambulate.  She has no pain at rest but does note increasing discomfort with any type of ambulation.  She denies any fevers or chills or drainage.  She now presents for evaluation.      PAST MEDICAL/SURGICAL HISTORY:  Past Medical History:   Diagnosis Date    Arthritis     Asthma     recent bronchits treated and resoved with Levaquin. Last Nov 2011.    Cataract     ou done//    Diabetes mellitus     diet controlled    GERD (gastroesophageal reflux disease)     Hypertension     Phlebitis and thrombophlebitis of the leg 2005 before     Past Surgical History:   Procedure Laterality Date    CATARACT EXTRACTION      os d 11/6/13// od d 4/16/14//    EYE SURGERY      both eyes    JOINT REPLACEMENT      bilateral knee replacement       Current Medications:   Current Outpatient Prescriptions:     aspirin (ECOTRIN) 81 MG EC tablet, Take 81 mg by mouth once daily., Disp: , Rfl:     furosemide (LASIX) 20 MG tablet, Take one pill every other day as needed for leg swelling., Disp: 15 tablet, Rfl: 0    hydrocodone-acetaminophen 5-325mg (NORCO) 5-325 mg per tablet, Take 1 tablet by mouth every 4 (four) hours as needed for Pain., Disp: 12 tablet, Rfl: 0    lisinopril (PRINIVIL,ZESTRIL) 2.5 MG tablet, Take 1 tablet (2.5 mg total) by mouth once daily., Disp: 30 tablet, Rfl: 0    metoprolol succinate (TOPROL-XL) 25 MG 24 hr tablet, TAKE ONE TABLET BY MOUTH ONCE DAILY, Disp: 30 tablet, Rfl: 0    mometasone 0.1% (ELOCON) 0.1 % cream,  "Apply topically once daily., Disp: 45 g, Rfl: 2    Social History:   Social History     Social History    Marital status:      Spouse name: N/A    Number of children: N/A    Years of education: N/A     Occupational History     Retired     Social History Main Topics    Smoking status: Former Smoker     Packs/day: 1.50     Years: 50.00     Types: Cigarettes     Quit date: 12/8/1990    Smokeless tobacco: Not on file    Alcohol use 8.4 oz/week     14 Glasses of wine per week      Comment: 1-3 glasses of wine daily    Drug use: No    Sexual activity: Not Currently     Other Topics Concern    Not on file     Social History Narrative    No narrative on file       ROS:  Constitution: Negative for chills, fever, and sweats. Negative for unexplained weight loss.  HENT: Negative for headaches and blurry vision.   Cardiovascular: Negative for chest pain, irregular heartbeat, leg swelling and palpitations.   Respiratory: Negative for cough and shortness of breath.   Gastrointestinal: Negative for abdominal pain, heartburn, nausea and vomiting.   Genitourinary: Negative for bladder incontinence and dysuria.   Musculoskeletal: Negative for systemic arthritis, muscle weakness and myalgias.   Neurological: Negative for numbness.   Psychiatric/Behavioral: Negative for depression.  Endocrine: Negative for polyuria.   Hematologic/Lymphatic: Negative for bleeding disorders.   Skin: Negative for poor wound healing.        PHYSICAL EXAM:  Ht 4' 11" (1.499 m)   Wt 85.3 kg (188 lb)   LMP 12/08/1978   BMI 37.97 kg/m²   Elderly female sitting in a wheelchair in no acute distress.  Examination both knees reveals well-healed surgical scars.  There is deformity of the right knee no shady varus deformity left knee.  Range of motion 0-100° bilaterally.  There is gross instability to valgus stress right greater than left.  No significant anterior posterior instability.  Sensation intact to both lower extremities.      IMAGING: "   X-rays of both knees are performed and reviewed.  Status post bilateral total knee arthroplasty.  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  Varus deformity and what appears to be obviously loosening on the right.  Alignment is better on the left.     ASSESSMENT:   Painful bilateral total knee arthroplasties  Status post bilateral total knee arthroplasties, 2005 (Dr. [facility])    PLAN:  The nature of the diagnosis, using models and diagrams when appropriate, was explained to the patient in detail.  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  Treatment options include conservative management including anti-inflammatory medication, modification of activity and bracing versus referral for consideration of revision arthroplasty.  As Dr. [facility] has perform the primary implants, she wishes to follow-up with him. I have arranged for her to return down to the [facility] for further evaluation and management..   This note was dictated using voice recognition software and may contain grammatical errors

## 2017-06-21 ENCOUNTER — DOCUMENTATION ONLY (OUTPATIENT)
Dept: FAMILY MEDICINE | Facility: CLINIC | Age: 82
End: 2017-06-21

## 2017-06-21 NOTE — PROGRESS NOTES
Pre-Visit Chart Review  For Appointment Scheduled on 06/23/2017    Health Maintenance Due   Topic Date Due    TETANUS VACCINE  [birth year]08/1947    Zoster Vaccine  03/08/1989

## 2017-07-11 ENCOUNTER — OFFICE VISIT (OUTPATIENT)
Dept: ORTHOPEDICS | Facility: CLINIC | Age: 82
End: 2017-07-11
Payer: MEDICARE

## 2017-07-11 VITALS
HEART RATE: 72 BPM | BODY MASS INDEX: 37.91 KG/M2 | DIASTOLIC BLOOD PRESSURE: 58 MMHG | HEIGHT: 59 IN | SYSTOLIC BLOOD PRESSURE: 133 MMHG | WEIGHT: 188.06 LBS

## 2017-07-11 DIAGNOSIS — T84.84XD PAINFUL TOTAL KNEE REPLACEMENT, SUBSEQUENT ENCOUNTER: Primary | ICD-10-CM

## 2017-07-11 DIAGNOSIS — Z96.659 PAINFUL TOTAL KNEE REPLACEMENT, SUBSEQUENT ENCOUNTER: Primary | ICD-10-CM

## 2017-07-11 PROCEDURE — 99999 PR PBB SHADOW E&M-EST. PATIENT-LVL II: CPT | Mod: PBBFAC,,, | Performed by: ORTHOPAEDIC SURGERY

## 2017-07-11 PROCEDURE — 99213 OFFICE O/P EST LOW 20 MIN: CPT | Mod: S$PBB,,, | Performed by: ORTHOPAEDIC SURGERY

## 2017-07-11 PROCEDURE — 1125F AMNT PAIN NOTED PAIN PRSNT: CPT | Mod: ,,, | Performed by: ORTHOPAEDIC SURGERY

## 2017-07-11 PROCEDURE — 99212 OFFICE O/P EST SF 10 MIN: CPT | Mod: PBBFAC,PN | Performed by: ORTHOPAEDIC SURGERY

## 2017-07-11 PROCEDURE — 1159F MED LIST DOCD IN RCRD: CPT | Mod: ,,, | Performed by: ORTHOPAEDIC SURGERY

## 2017-07-11 NOTE — PROGRESS NOTES
Past Medical History:   Diagnosis Date    Arthritis     Asthma     recent bronchits treated and resoved with Levaquin. Last Nov 2011.    Cataract     ou done//    Diabetes mellitus     diet controlled    GERD (gastroesophageal reflux disease)     Hypertension     Phlebitis and thrombophlebitis of the leg 2005 before       Past Surgical History:   Procedure Laterality Date    CATARACT EXTRACTION      os d 11/6/13// od d 4/16/14//    EYE SURGERY      both eyes    JOINT REPLACEMENT      bilateral knee replacement       Current Outpatient Prescriptions   Medication Sig    aspirin (ECOTRIN) 81 MG EC tablet Take 81 mg by mouth once daily.    furosemide (LASIX) 20 MG tablet Take one pill every other day as needed for leg swelling.    hydrocodone-acetaminophen 5-325mg (NORCO) 5-325 mg per tablet Take 1 tablet by mouth every 4 (four) hours as needed for Pain.    lisinopril (PRINIVIL,ZESTRIL) 2.5 MG tablet Take 1 tablet (2.5 mg total) by mouth once daily.    metoprolol succinate (TOPROL-XL) 25 MG 24 hr tablet TAKE ONE TABLET BY MOUTH ONCE DAILY    mometasone 0.1% (ELOCON) 0.1 % cream Apply topically once daily.     No current facility-administered medications for this visit.        Review of patient's allergies indicates:   Allergen Reactions    No known drug allergies        Family History   Problem Relation Age of Onset    Stroke Mother     Heart disease Father     Amblyopia Neg Hx     Blindness Neg Hx     Cancer Neg Hx     Cataracts Neg Hx     Diabetes Neg Hx     Glaucoma Neg Hx     Hypertension Neg Hx     Macular degeneration Neg Hx     Retinal detachment Neg Hx     Strabismus Neg Hx     Thyroid disease Neg Hx        Social History     Social History    Marital status:      Spouse name: N/A    Number of children: N/A    Years of education: N/A     Occupational History     Retired     Social History Main Topics    Smoking status: Former Smoker     Packs/day: 1.50     Years: 50.00  "    Types: Cigarettes     Quit date: 12/8/1990    Smokeless tobacco: Not on file    Alcohol use 8.4 oz/week     14 Glasses of wine per week      Comment: 1-3 glasses of wine daily    Drug use: No    Sexual activity: Not Currently     Other Topics Concern    Not on file     Social History Narrative    No narrative on file       Chief Complaint:   Chief Complaint   Patient presents with    Left Knee - Pain    Right Knee - Pain       Consulting Physician: No ref. provider found    History of present illness:    This is a 88 y.o. year old female who complains of left knee pain.  She states that she had a total knee done approximately 15 or more years ago on both sides but that the left one has been bothering her for the last few years.  She states it is getting worse.  She states the pain in her left knee is so severe she is unable to walk on it.  She puts it at a 10 out of 10.  She uses a wheelchair at this time because her knee hurts so much.    Review of Systems:    Constitution: Denies chills, fever, and sweats.  HENT: Denies headaches or blurry vision.  Cardiovascular: Denies chest pain or irregular heart beat.  Respiratory: Denies cough or shortness of breath.  Gastrointestinal: Denies abdominal pain, nausea, or vomiting.  Musculoskeletal:  Denies muscle cramps.  Neurological: Denies dizziness or focal weakness.  Psychiatric/Behavioral: Normal mental status.  Hematologic/Lymphatic: Denies bleeding problem or easy bruising/bleeding.  Skin: Denies rash or suspicious lesions.    Examination:    Vital Signs:    Vitals:    07/11/17 1447   BP: (!) 133/58   Pulse: 72   Weight: 85.3 kg (188 lb 0.8 oz)   Height: 4' 11" (1.499 m)   PainSc:   9   PainLoc: Knee       Body mass index is 37.98 kg/m².    This a well-developed, well nourished patient in no acute distress.    Alert and oriented and cooperative to examination.       Physical Exam: Left Knee " Exam    Gait   Antalgic    Skin  Rash:   None  Scars:   None    Inspection  Erythema:  None  Bruising:  None  Effusion:  None  Masses:  None  Lymphadenopathy: None    Range of Motion: 0 to 90°    Medial Joint : y  Lateral Joint : y    Patellofemoral Tenderness: Yes  Patellofemoral Crepitus: Yes    Varus Stress:  Stable  Valgus Stress:  Stable    Strength:  5/5    Pulses:  Palpable  Sensation:  Intact          Imaging: X-rays of the left knee show a collapse of the medial femoral condyle throwing the knee into varus.        Assessment: Painful total knee replacement, subsequent encounter        Plan:  We discussed treatment options and explained that the best course of action would be a revision total knee arthroplasty.  We will send her down to [facility].      DISCLAIMER: This note may have been dictated using voice recognition software and may contain grammatical errors.     NOTE: Consult report sent to referring provider via [employer] EMR.

## 2017-07-18 ENCOUNTER — DOCUMENTATION ONLY (OUTPATIENT)
Dept: FAMILY MEDICINE | Facility: CLINIC | Age: 82
End: 2017-07-18

## 2017-07-18 NOTE — PROGRESS NOTES
Pre-Visit Chart Review  For Appointment Scheduled on 7/19/17    Health Maintenance Due   Topic Date Due    TETANUS VACCINE  03/08/1947    Zoster Vaccine  03/08/1989

## 2017-07-19 ENCOUNTER — LAB VISIT (OUTPATIENT)
Dept: LAB | Facility: HOSPITAL | Age: 82
End: 2017-07-19
Attending: FAMILY MEDICINE
Payer: MEDICARE

## 2017-07-19 ENCOUNTER — OFFICE VISIT (OUTPATIENT)
Dept: FAMILY MEDICINE | Facility: CLINIC | Age: 82
End: 2017-07-19
Payer: MEDICARE

## 2017-07-19 VITALS
RESPIRATION RATE: 16 BRPM | HEIGHT: 59 IN | BODY MASS INDEX: 37.5 KG/M2 | WEIGHT: 186 LBS | SYSTOLIC BLOOD PRESSURE: 111 MMHG | HEART RATE: 71 BPM | OXYGEN SATURATION: 96 % | DIASTOLIC BLOOD PRESSURE: 70 MMHG

## 2017-07-19 DIAGNOSIS — I10 ESSENTIAL HYPERTENSION: Chronic | ICD-10-CM

## 2017-07-19 DIAGNOSIS — I73.9 PVD (PERIPHERAL VASCULAR DISEASE): Primary | Chronic | ICD-10-CM

## 2017-07-19 DIAGNOSIS — M79.89 LEG SWELLING: ICD-10-CM

## 2017-07-19 LAB
ALBUMIN SERPL BCP-MCNC: 3.1 G/DL
ALP SERPL-CCNC: 78 U/L
ALT SERPL W/O P-5'-P-CCNC: 11 U/L
ANION GAP SERPL CALC-SCNC: 6 MMOL/L
AST SERPL-CCNC: 24 U/L
BILIRUB SERPL-MCNC: 0.6 MG/DL
BNP SERPL-MCNC: 180 PG/ML
BUN SERPL-MCNC: 18 MG/DL
CALCIUM SERPL-MCNC: 9.5 MG/DL
CHLORIDE SERPL-SCNC: 108 MMOL/L
CO2 SERPL-SCNC: 27 MMOL/L
CREAT SERPL-MCNC: 1 MG/DL
EST. GFR  (AFRICAN AMERICAN): 58.1 ML/MIN/1.73 M^2
EST. GFR  (NON AFRICAN AMERICAN): 50.4 ML/MIN/1.73 M^2
GLUCOSE SERPL-MCNC: 91 MG/DL
POTASSIUM SERPL-SCNC: 5 MMOL/L
PROT SERPL-MCNC: 7.7 G/DL
SODIUM SERPL-SCNC: 141 MMOL/L

## 2017-07-19 PROCEDURE — 99213 OFFICE O/P EST LOW 20 MIN: CPT | Mod: S$PBB,,, | Performed by: PHYSICIAN ASSISTANT

## 2017-07-19 PROCEDURE — 80053 COMPREHEN METABOLIC PANEL: CPT

## 2017-07-19 PROCEDURE — 1126F AMNT PAIN NOTED NONE PRSNT: CPT | Mod: ,,, | Performed by: PHYSICIAN ASSISTANT

## 2017-07-19 PROCEDURE — 1159F MED LIST DOCD IN RCRD: CPT | Mod: ,,, | Performed by: PHYSICIAN ASSISTANT

## 2017-07-19 PROCEDURE — 99999 PR PBB SHADOW E&M-EST. PATIENT-LVL III: CPT | Mod: PBBFAC,,, | Performed by: PHYSICIAN ASSISTANT

## 2017-07-19 PROCEDURE — 36415 COLL VENOUS BLD VENIPUNCTURE: CPT | Mod: PO

## 2017-07-19 PROCEDURE — 83880 ASSAY OF NATRIURETIC PEPTIDE: CPT

## 2017-07-19 RX ORDER — FUROSEMIDE 20 MG/1
TABLET ORAL
Qty: 30 TABLET | Refills: 5 | Status: SHIPPED | OUTPATIENT
Start: 2017-07-19 | End: 2017-11-03 | Stop reason: SDUPTHER

## 2017-07-19 RX ORDER — POTASSIUM CHLORIDE 20 MEQ/1
20 TABLET, EXTENDED RELEASE ORAL DAILY
Qty: 30 TABLET | Refills: 5 | Status: SHIPPED | OUTPATIENT
Start: 2017-07-19 | End: 2018-12-05 | Stop reason: SDUPTHER

## 2017-07-19 NOTE — PATIENT INSTRUCTIONS
Discharge Instructions: Eating a Low-Salt Diet  Your health care provider has prescribed a low-salt diet for you. Most people with heart problems need to eat less salt, which is full of sodium. Too much sodium is linked to high blood pressure, which is linked to a greater risk of heart disease, stroke, blindness, and kidney problems.  Home care    Learn ways to cut back on salt (sodium):  · Eat less frozen, canned, dried, packaged, and fast foods. These often contain high amounts of sodium.  · Season foods with herbs instead of salt when you cook.  · Season with flavorings such as pepper, lemon, garlic, and onion.  · Dont add salt to your food at the table.  · Sprinkle salt-free herbal blends on meats and vegetables.  Learn to read food labels carefully:  · Look for the total amount of sodium per serving.  · Look for foods labeled low sodium, reduced sodium, or no added salt.  · Beware. Salt goes by many names. Cut down on foods with these words (all forms of salt) listed as ingredients:  ¨ Salt  ¨ Sodium  ¨ Soy sauce  ¨ Baking soda  ¨ Baking powder  ¨ MSG  ¨ Monosodium  ¨ Na (the chemical symbol for sodium)  Other ideas:  · Use more fresh food. Buy more fruits and vegetables.  · Select lean meats, fish, and poultry.  · Find a cookbook with low-salt recipes. Youll find ideas for tasty meals that are healthy for your heart.  ·   When eating out, ask questions about the menu. Tell the  you're on a low-salt diet.  ¨ If you order fish, chicken, beef, or pork, ask the  to have it broiled, baked, poached, or grilled without salt, butter, or breading.  ¨ Choose plain steamed rice, boiled noodles, and baked or boiled potatoes. Top potatoes with chives and a little sour cream instead of butter.  · Avoid antacids that are high in salt. Check the label before you buy.  Follow-up  Make a follow-up appointment with a nutritionist as directed by our staff.  Date Last Reviewed: 6/20/2015  © 2797-8529 [employer]. [address] [place] [address], [place] [address]. All rights reserved. This information is not intended as a substitute for professional medical care. Always follow your healthcare professional's instructions.        Established High Blood Pressure    High blood pressure (hypertension) is a chronic disease. Often health care providers dont know what causes it. But it can be caused by certain health conditions and medicines.  If you have high blood pressure, you may not have any symptoms. If you do have symptoms, they may include headache, dizziness, changes in your vision, chest pain, and shortness of breath. But even without symptoms, high blood pressure thats not treated raises your risk for heart attack and stroke. High blood pressure is a serious health risk and shouldnt be ignored.  A blood pressure reading is made up of two numbers: a higher number over a lower number. The top number is the systolic pressure. The bottom number is the diastolic pressure. A normal blood pressure is less than 120 over less than 80.  High blood pressure is when either the top number is 140 or higher, or the bottom number is 90 or higher. This must be the result when taking your blood pressure a number of times. The blood pressures between normal and high are called prehypertension.  Home care  If you have high blood pressure, you should do what is listed below to lower your blood pressure. If you are taking medicines for high blood pressure, these methods may reduce or end your need for medicines in the future.  · Begin a weight-loss program if you are overweight.  · Cut back on how much salt you get in your diet. Heres how to do this:  ¨ Dont eat foods that have a lot of salt. These include olives, pickles, smoked meats, and salted potato chips.  ¨ Dont add salt to your food at the table.  ¨ Use only small amounts of salt when cooking.  · Begin an exercise program. Talk with your health care provider about the type  of exercise program that would be best for you. It doesn't have to be hard. Even brisk walking for 20 minutes 3 times a week is a good form of exercise.  · Dont take medicines that have heart stimulants. This includes many cold and sinus decongestant pills and sprays, as well as diet pills. Check the warnings about hypertension on the label. Stimulants such as amphetamine or cocaine could be lethal for someone with high blood pressure. Never take these.  · Limit how much caffeine you get in your diet. Switch to caffeine-free products.  · Stop smoking. If you are a long-time smoker, this can be hard. Enroll in a stop-smoking program to make it more likely that you will quit for good.  · Learn how to handle stress. This is an important part of any program to lower blood pressure. Learn about relaxation methods like meditation, yoga, or biofeedback.  · If your provider prescribed medicines, take them exactly as directed. Missing doses may cause your blood pressure get out of control.  · Consider buying an automatic blood pressure machine. You can get one of these at most pharmacies. Use this to watch your blood pressure at home. Give the results to your provider.  Follow-up care  You will need to make regular visits to your health care provider. This is to check your blood pressure and to make changes to your medicines. Make a follow-up appointment as directed.  When to seek medical advice  Call your health care provider right away if any of these occur:  · Chest pain or shortness of breath  · Severe headache  · Throbbing or rushing sound in the ears  · Nosebleed  · Sudden severe pain in your belly (abdomen)  · Extreme drowsiness, confusion, or fainting  · Dizziness or dizziness with a spinning sensation (vertigo)  · Weakness of an arm or leg or one side of the face  · You have problems speaking or seeing   Date Last Reviewed: 11/25/2014  © 9864-0895 [employer]. [address], [place]  [address]. All rights reserved. This information is not intended as a substitute for professional medical care. Always follow your healthcare professional's instructions.            Walking for Fitness  Fitness walking has something for everyone, even people who are already fit. Walking is one of the safest ways to condition your body aerobically. It can boost energy, help you lose weight, and reduce stress.    Physical benefits  · Walking strengthens your heart and lungs, and tones your muscles.  · When walking, your feet land with less impact than in other sports. This reduces chances of muscle, bone, and joint injury.  · Regular walking improves your cholesterol levels and lowers your risk of heart disease. And it helps you control your blood sugar if you have diabetes.  · Walking is a weight-bearing activity, which helps maintain bone density. This can help prevent osteoporosis.  Personal rewards  · Taking walks can help you relax and manage stress. And fitness walking may make you feel better about yourself.  · Walking can help you sleep better at night and make you less likely to be depressed.  · Regular walking may help maintain your memory as you get older.  · Walking is a great way to spend extra time with friends and family members. Be sure to invite your dog along!  Q&A about fitness walking  Q: Will walking keep me fit?  A: Yes. Regular walking at the right pace gives you all the benefits of other aerobic activities, such as jogging and swimming.  Q: Will walking help me lose weight and keep it off?  A: Yes. Per mile, walking can burn as many calories as jogging. Your health care provider can help work walking into your weight-loss plan.  Q: Is walking safe for my health?  A: Yes. Walking is safe if you have high blood pressure, diabetes, heart disease, or other conditions. Talk to your health care provider before you start.  Date Last Reviewed: 5/9/2015  © 0598-6115 [employer]. [address] [place] [address]  [place] [address]. All rights reserved. This information is not intended as a substitute for professional medical care. Always follow your healthcare professional's instructions.            Weight Management: Getting Started  Healthy bodies come in all shapes and sizes. Not all bodies are made to be thin. For some people, a healthy weight is higher than the average weight listed on weight charts. Your healthcare provider can help you decide on a healthy weight for you.    Reasons to lose weight  Losing weight can help with some health problems, such as high blood pressure, heart disease, diabetes, sleep apnea, and arthritis. You may also feel more energy.  Set your long-term goal  Your goal doesn't even have to be a specific weight. You may decide on a fitness goal (such as being able to walk 10 miles a week), or a health goal (such as lowering your blood pressure). Choose a goal that is measurable and reasonable, so you know when you've reached it. A goal of reaching a BMI of less than 25 is not always reasonable (or possible).   Make an action plan  Habits dont change overnight. Setting your goals too high can leave you feeling discouraged if you cant reach them. Be realistic. Choose one or two small changes you can make now. Set an action plan for how you are going to make these changes. When you can stick to this plan, keep making a few more small changes. Taking small steps will help you stay on the path to success.  Track your progress  Write down your goals. Then, keep a daily record of your progress. Write down what you eat and how active you are. This record lets you look back on how much youve done. It may also help when youre feeling frustrated. Reward yourself for success. Even if you dont reach every goal, give yourself credit for what you do get done.  Get support  Encouragement from others can help make losing weight easier. Ask your family members and friends for support. They may  even want to join you. Also look to your healthcare provider, registered dietitian, and  for help. Your local hospital can give you more information about nutrition, exercise, and weight loss.  Date Last Reviewed: 1/31/2016  © 3622-7434 [employer]. [address] [place] [address], [place] [address]. All rights reserved. This information is not intended as a substitute for professional medical care. Always follow your healthcare professional's instructions.

## 2017-07-19 NOTE — PROGRESS NOTES
Subjective:       Patient ID: Fracisco Pires is a 88 y.o. female.    Chief Complaint: Follow-up    Ms. Pires comes to clinic today for follow up. She complains of leg swelling. The patient has chronic leg swelling. She ran out of refills on her medication. The patient and her  do admit that they consume a lot of canned foods because they do not cook anymore. The patient understanding that there is a lot of salt in these foods. The patient does not wear compression stockings and she refuses to do so.       Review of Systems   Constitutional: Negative for activity change, appetite change and fever.   HENT: Negative for postnasal drip, rhinorrhea and sinus pressure.    Eyes: Negative for visual disturbance.   Respiratory: Negative for cough and shortness of breath.    Cardiovascular: Positive for leg swelling. Negative for chest pain.   Gastrointestinal: Negative for abdominal distention and abdominal pain.   Neurological: Negative for headaches.   Hematological: Negative for adenopathy.   Psychiatric/Behavioral: The patient is not nervous/anxious.        Objective:      Physical Exam   Constitutional: She is oriented to person, place, and time.   Cardiovascular: Normal rate and regular rhythm.    Pulmonary/Chest: Effort normal and breath sounds normal. She has no wheezes.   Abdominal: Soft. Bowel sounds are normal. There is no tenderness.   Musculoskeletal: She exhibits edema.   Bilateral distal lower extremity edema   Neurological: She is alert and oriented to person, place, and time.   Skin: No erythema.   Psychiatric: Her behavior is normal.       Assessment:       1. PVD (peripheral vascular disease)    2. Leg swelling    3. Essential hypertension        Plan:   Fracisco was seen today for follow-up.    Diagnoses and all orders for this visit:    PVD (peripheral vascular disease)  -     potassium chloride SA (K-DUR,KLOR-CON) 20 MEQ tablet; Take 1 tablet (20 mEq total) by mouth once daily.    Leg  swelling  -     furosemide (LASIX) 20 MG tablet; Take one pill every other day as needed for leg swelling.  -     potassium chloride SA (K-DUR,KLOR-CON) 20 MEQ tablet; Take 1 tablet (20 mEq total) by mouth once daily.  -     Comprehensive metabolic panel; Future  -     Brain natriuretic peptide; Future  Elevate legs when possible  Low salt diet  Compression stockings encouraged  Follow up in 2 weeks.   Essential hypertension  -     potassium chloride SA (K-DUR,KLOR-CON) 20 MEQ tablet; Take 1 tablet (20 mEq total) by mouth once daily.  -     Comprehensive metabolic panel; Future  -     Brain natriuretic peptide; Future  Controlled  Low salt diet

## 2017-07-21 ENCOUNTER — TELEPHONE (OUTPATIENT)
Dept: FAMILY MEDICINE | Facility: CLINIC | Age: 82
End: 2017-07-21

## 2017-07-21 NOTE — TELEPHONE ENCOUNTER
----- Message from Cleveland Gilliam sent at 7/20/2017  9:46 AM CDT -----  Contact: [phone]  Patient states she is returning the nurse phone call.Thanks!

## 2017-07-21 NOTE — TELEPHONE ENCOUNTER
----- Message from Bianca Owusu PA-C sent at 7/20/2017  7:15 AM CDT -----  Blood work shows BNP remains elevated. Echocardiogram is scheduled for August 1; I would like this sooner if possible so we can get the results ASAP. CMP shows mild decrease in kidney function. Increase water intake

## 2017-07-25 ENCOUNTER — DOCUMENTATION ONLY (OUTPATIENT)
Dept: FAMILY MEDICINE | Facility: CLINIC | Age: 82
End: 2017-07-25

## 2017-07-25 NOTE — PROGRESS NOTES
Pre-Visit Chart Review  For Appointment Scheduled on 8/2/17    Health Maintenance Due   Topic Date Due    TETANUS VACCINE  03/08/1947    Zoster Vaccine  03/08/1989

## 2017-07-27 ENCOUNTER — TELEPHONE (OUTPATIENT)
Dept: ORTHOPEDICS | Facility: CLINIC | Age: 82
End: 2017-07-27

## 2017-07-27 NOTE — TELEPHONE ENCOUNTER
----- Message from Cleveland Gilliam sent at 7/27/2017 10:32 AM CDT -----  Contact: self  [phone]  Patient called asking if she need to be seen by an orthopedic doctor at the [facility] main campus. Patient says she doesn't want to see a doctor at the [facility]. She would be willing to see a doctor at [facility]. Thanks!

## 2017-07-27 NOTE — TELEPHONE ENCOUNTER
Call and spoke with pt and she stated that she did not want to go to main [place]. Informed pt that we did not have a specialist in [place] who would do TKR. Pt then stated she would like another appointment with Dr. [facility]. Scheduled pt for 8/22/17 at 1:40pm. Pt voiced understanding and has no further requests at this time.

## 2017-07-27 NOTE — TELEPHONE ENCOUNTER
Patient notified of results. Verbalized understanding. Attempted to schedule Echocardiogram sooner than 8-1-17, unable to do so. 8-1-17 is earliest appointment available. Patient notified.

## 2017-07-31 RX ORDER — METOPROLOL SUCCINATE 25 MG/1
TABLET, EXTENDED RELEASE ORAL
Qty: 30 TABLET | Refills: 0 | Status: SHIPPED | OUTPATIENT
Start: 2017-07-31 | End: 2017-08-07 | Stop reason: SDUPTHER

## 2017-08-03 ENCOUNTER — TELEPHONE (OUTPATIENT)
Dept: FAMILY MEDICINE | Facility: CLINIC | Age: 82
End: 2017-08-03

## 2017-08-03 ENCOUNTER — TELEPHONE (OUTPATIENT)
Dept: ORTHOPEDICS | Facility: CLINIC | Age: 82
End: 2017-08-03

## 2017-08-03 DIAGNOSIS — I50.20 SYSTOLIC CONGESTIVE HEART FAILURE, UNSPECIFIED CONGESTIVE HEART FAILURE CHRONICITY: Primary | ICD-10-CM

## 2017-08-03 NOTE — TELEPHONE ENCOUNTER
----- Message from Mary Jolley sent at 8/3/2017  3:21 PM CDT -----  Please call patient in reference to the pain in her knee.  Call [phone].

## 2017-08-03 NOTE — TELEPHONE ENCOUNTER
Spoke with pt and she stated that she was in pain. Informed pt that she already has first available appointment after Dr. Rodarte returns from vacation. Also informed pt that she could ice and elevate the knee and she stated that she does not do that. Pt has no further requests at this time.

## 2017-08-03 NOTE — TELEPHONE ENCOUNTER
Order for Echo canceled per PCP request. Order for Cardiology scheduled for 8-28-17. Patient agreed to appointment date and time.

## 2017-08-07 RX ORDER — METOPROLOL SUCCINATE 25 MG/1
TABLET, EXTENDED RELEASE ORAL
Qty: 30 TABLET | Refills: 0 | Status: SHIPPED | OUTPATIENT
Start: 2017-08-07 | End: 2017-09-06 | Stop reason: SDUPTHER

## 2017-08-07 NOTE — TELEPHONE ENCOUNTER
----- Message from Lizeth De Jesus sent at 8/7/2017  9:56 AM CDT -----  Contact: Pt  1. What is the name of the medication you are requesting? Metoprolol  2. What is the dose? 25  3. How do you take the medication? Orally, topically, etc? Orally  4. How often do you take this medication? One once a day  5. Do you need a 30 day or 90 day supply? 90  6. How many refills are you requesting? Per   7. What is your preferred pharmacy and location of the pharmacy? ..  [facility] [address] [facility] [address], [place] - [address] [facility].  [address] [place], [place] [address] [address]  Phone: [phone] Fax: [phone]  8. Who can we contact with further questions? Pt

## 2017-08-28 ENCOUNTER — TELEPHONE (OUTPATIENT)
Dept: CARDIOLOGY | Facility: CLINIC | Age: 82
End: 2017-08-28

## 2017-08-28 DIAGNOSIS — I50.9 CONGESTIVE HEART FAILURE, UNSPECIFIED CONGESTIVE HEART FAILURE CHRONICITY, UNSPECIFIED CONGESTIVE HEART FAILURE TYPE: Primary | ICD-10-CM

## 2017-08-28 NOTE — TELEPHONE ENCOUNTER
----- Message from Nina Jarrett sent at 8/28/2017  2:06 PM CDT -----  Contact: Gabriele Welchruel   - Gabriele [phone] is cancelling patient's appt today 08 28 17 at 2:20pm due to bad weather/he is needing to reschedule but the first available appt that I show is 09 21 17/ is asking for an appt for as soon as possible/please advise

## 2017-08-28 NOTE — TELEPHONE ENCOUNTER
Returned pts phone call. Rescheduled pts appointment with Dr. Sanders for Wednesday (8/30/17) at 3:20pm. No other issues discussed

## 2017-09-06 RX ORDER — METOPROLOL SUCCINATE 25 MG/1
25 TABLET, EXTENDED RELEASE ORAL DAILY
Qty: 30 TABLET | Refills: 6 | Status: SHIPPED | OUTPATIENT
Start: 2017-09-06 | End: 2019-01-21 | Stop reason: SDUPTHER

## 2017-09-06 RX ORDER — LISINOPRIL 2.5 MG/1
2.5 TABLET ORAL DAILY
Qty: 30 TABLET | Refills: 6 | Status: SHIPPED | OUTPATIENT
Start: 2017-09-06 | End: 2019-05-30 | Stop reason: SDUPTHER

## 2017-09-06 RX ORDER — LISINOPRIL 2.5 MG/1
TABLET ORAL
Qty: 30 TABLET | Refills: 0 | Status: SHIPPED | OUTPATIENT
Start: 2017-09-06 | End: 2017-09-06 | Stop reason: SDUPTHER

## 2017-09-06 NOTE — TELEPHONE ENCOUNTER
----- Message from Brigid Mix sent at 9/6/2017 10:25 AM CDT -----  Contact: self  Patient needs a refill on Lisinopril; Metoprolol called into [facility] pharmacy at [phone].  Please call patient at [phone] if you have any questions. Thanks!     [facility] [id number]  [place] [address]  [place] [address]  Phone: [phone] Fax: [phone]

## 2017-09-06 NOTE — TELEPHONE ENCOUNTER
----- Message from Lizbeth Rios sent at 9/6/2017  9:53 AM CDT -----  metoprolol succinate (TOPROL-XL) 25 MG 24 hr tablet refill    [phone] (home)     [facility] [address] [place] [address]  [place] [address]  Phone: [phone] Fax: [phone]

## 2017-09-07 ENCOUNTER — TELEPHONE (OUTPATIENT)
Dept: FAMILY MEDICINE | Facility: CLINIC | Age: 82
End: 2017-09-07

## 2017-09-14 ENCOUNTER — TELEPHONE (OUTPATIENT)
Dept: FAMILY MEDICINE | Facility: CLINIC | Age: 82
End: 2017-09-14

## 2017-09-14 NOTE — TELEPHONE ENCOUNTER
Patient's spouse requesting HH orders for patient. Advised patient will need to be seen for face-to-face eval. Scheduled with CHAPIN Owusu, per patient's request.

## 2017-09-14 NOTE — TELEPHONE ENCOUNTER
----- Message from Tatyana Gutierrez sent at 9/11/2017  3:36 PM CDT -----  Contact: Gabriele Pires (Spouse) [phone]  Patient need home health orders.    Please call patient spouse Gabriele Pires (Spouse) [phone].

## 2017-09-18 ENCOUNTER — DOCUMENTATION ONLY (OUTPATIENT)
Dept: FAMILY MEDICINE | Facility: CLINIC | Age: 82
End: 2017-09-18

## 2017-09-18 NOTE — PROGRESS NOTES
Pre-Visit Chart Review  For Appointment Scheduled on 9/25/17    Health Maintenance Due   Topic Date Due    TETANUS VACCINE  03/08/1947    Zoster Vaccine  03/08/1989    Influenza Vaccine  08/01/2017

## 2017-09-25 ENCOUNTER — TELEPHONE (OUTPATIENT)
Dept: FAMILY MEDICINE | Facility: CLINIC | Age: 82
End: 2017-09-25

## 2017-09-27 ENCOUNTER — DOCUMENTATION ONLY (OUTPATIENT)
Dept: FAMILY MEDICINE | Facility: CLINIC | Age: 82
End: 2017-09-27

## 2017-09-27 NOTE — PROGRESS NOTES
Pre-Visit Chart Review  For Appointment Scheduled on 10/2/17    Health Maintenance Due   Topic Date Due    TETANUS VACCINE  03/08/1947    Zoster Vaccine  03/08/1989    Influenza Vaccine  08/01/2017

## 2017-09-28 ENCOUNTER — TELEPHONE (OUTPATIENT)
Dept: FAMILY MEDICINE | Facility: CLINIC | Age: 82
End: 2017-09-28

## 2017-09-28 NOTE — TELEPHONE ENCOUNTER
----- Message from RT Ondina sent at 9/21/2017  3:49 PM CDT -----  Contact: pt    pt , requesting a call back soon she would like antibiotic for her infected heal on her foot, if possible, thanks.

## 2017-10-02 ENCOUNTER — NURSE TRIAGE (OUTPATIENT)
Dept: ADMINISTRATIVE | Facility: CLINIC | Age: 82
End: 2017-10-02

## 2017-10-02 ENCOUNTER — TELEPHONE (OUTPATIENT)
Dept: FAMILY MEDICINE | Facility: CLINIC | Age: 82
End: 2017-10-02

## 2017-10-02 NOTE — TELEPHONE ENCOUNTER
Reason for Disposition   MODERATE swelling of both ankles (e.g., swelling extends up to the knees) AND new onset or worsening    Protocols used: ST LEG SWELLING AND EDEMA-A-OH    Pt  states pt has BLE swelling. States he has appt today at 2 pm.  Informed ok for pt to wait and go to that appt per protocol. Care advice given.

## 2017-10-02 NOTE — TELEPHONE ENCOUNTER
----- Message from Bobbi Tilley sent at 10/2/2017 10:43 AM CDT -----  Contact:    john fam     stating she is filled with fluid and might die soon   Call back

## 2017-10-02 NOTE — TELEPHONE ENCOUNTER
Patient declined the ER and declined to come to office.  Patient stated that she has not been taking her fluid pill due to the fact that it makes her go to the bathroom too often.  She states she will take the pill and if not better soon she will go to ER.

## 2017-10-09 ENCOUNTER — TELEPHONE (OUTPATIENT)
Dept: FAMILY MEDICINE | Facility: CLINIC | Age: 82
End: 2017-10-09

## 2017-10-09 NOTE — TELEPHONE ENCOUNTER
Received message from patient's  stating patient is in need of home health care. Upon further inspection it was noted patient has an appointment on 10-18-17 related to this request. Call placed to patient for notification. Patient verbalized understanding. Appointment date and time confirmed. Patient verbalized understanding.

## 2017-10-16 ENCOUNTER — DOCUMENTATION ONLY (OUTPATIENT)
Dept: FAMILY MEDICINE | Facility: CLINIC | Age: 82
End: 2017-10-16

## 2017-10-16 NOTE — PROGRESS NOTES
Pre-Visit Chart Review  For Appointment Scheduled on 10/18/17    Health Maintenance Due   Topic Date Due    TETANUS VACCINE  03/08/1947    Zoster Vaccine  03/08/1989    Influenza Vaccine  08/01/2017

## 2017-10-18 ENCOUNTER — TELEPHONE (OUTPATIENT)
Dept: FAMILY MEDICINE | Facility: CLINIC | Age: 82
End: 2017-10-18

## 2017-10-18 NOTE — TELEPHONE ENCOUNTER
Called patient to see if she was able to come in at 12:40 for her appt today. Patient stated that she can come in at that time.

## 2017-10-18 NOTE — TELEPHONE ENCOUNTER
"Patient missed her appointment today and I called patient to verify everything was ok especially since I had just spoken to her prior to the appointment. Patient stated that she was aware that she missed her appointment and that she was a "bad girl", I asked her if her  was home so we can get her appt rescheduled. She stated that he was sleeping. I asked her to have him call us when he gets up so we can rescheduled this missed appt. Patient verbalized understanding.   "

## 2017-10-18 NOTE — TELEPHONE ENCOUNTER
----- Message from Mikel Wilson sent at 10/18/2017  1:08 PM CDT -----  Contact: patient  Patient states that she had missed her appointment and it was about discussing some home health.  She wanted some orders to be put in for that.  She can come in this afternoon.  Can you please call her back at [phone].  Thank you

## 2017-10-27 ENCOUNTER — DOCUMENTATION ONLY (OUTPATIENT)
Dept: FAMILY MEDICINE | Facility: CLINIC | Age: 82
End: 2017-10-27

## 2017-10-27 NOTE — PROGRESS NOTES
Pre-Visit Chart Review  For Appointment Scheduled on 11/1/17    Health Maintenance Due   Topic Date Due    TETANUS VACCINE  [birth year]08/1947    Zoster Vaccine  03/08/1989    Influenza Vaccine  08/01/2017

## 2017-11-03 ENCOUNTER — TELEPHONE (OUTPATIENT)
Dept: FAMILY MEDICINE | Facility: CLINIC | Age: 82
End: 2017-11-03

## 2017-11-03 ENCOUNTER — OFFICE VISIT (OUTPATIENT)
Dept: FAMILY MEDICINE | Facility: CLINIC | Age: 82
End: 2017-11-03
Payer: MEDICARE

## 2017-11-03 VITALS
OXYGEN SATURATION: 95 % | DIASTOLIC BLOOD PRESSURE: 64 MMHG | WEIGHT: 186.94 LBS | HEIGHT: 59 IN | SYSTOLIC BLOOD PRESSURE: 105 MMHG | TEMPERATURE: 99 F | RESPIRATION RATE: 16 BRPM | HEART RATE: 73 BPM | BODY MASS INDEX: 37.68 KG/M2

## 2017-11-03 DIAGNOSIS — I10 ESSENTIAL HYPERTENSION: Primary | Chronic | ICD-10-CM

## 2017-11-03 DIAGNOSIS — E66.01 SEVERE OBESITY (BMI 35.0-39.9) WITH COMORBIDITY: ICD-10-CM

## 2017-11-03 DIAGNOSIS — R53.81 DEBILITY: ICD-10-CM

## 2017-11-03 DIAGNOSIS — L98.9 SKIN LESION: ICD-10-CM

## 2017-11-03 DIAGNOSIS — M17.0 PRIMARY OSTEOARTHRITIS OF BOTH KNEES: ICD-10-CM

## 2017-11-03 DIAGNOSIS — M79.89 LEG SWELLING: ICD-10-CM

## 2017-11-03 DIAGNOSIS — Z23 NEED FOR INFLUENZA VACCINATION: ICD-10-CM

## 2017-11-03 DIAGNOSIS — R60.0 BILATERAL LOWER EXTREMITY EDEMA: ICD-10-CM

## 2017-11-03 PROCEDURE — G0008 ADMIN INFLUENZA VIRUS VAC: HCPCS | Mod: PBBFAC,PO

## 2017-11-03 PROCEDURE — 99214 OFFICE O/P EST MOD 30 MIN: CPT | Mod: PBBFAC,PO | Performed by: PHYSICIAN ASSISTANT

## 2017-11-03 PROCEDURE — 99214 OFFICE O/P EST MOD 30 MIN: CPT | Mod: S$PBB,,, | Performed by: PHYSICIAN ASSISTANT

## 2017-11-03 PROCEDURE — 90662 IIV NO PRSV INCREASED AG IM: CPT | Mod: PBBFAC,PO | Performed by: PHYSICIAN ASSISTANT

## 2017-11-03 PROCEDURE — 99999 PR PBB SHADOW E&M-EST. PATIENT-LVL IV: CPT | Mod: PBBFAC,,, | Performed by: PHYSICIAN ASSISTANT

## 2017-11-03 RX ORDER — FUROSEMIDE 20 MG/1
TABLET ORAL
Qty: 30 TABLET | Refills: 5 | Status: SHIPPED | OUTPATIENT
Start: 2017-11-03 | End: 2018-12-05 | Stop reason: SDUPTHER

## 2017-11-03 NOTE — PROGRESS NOTES
Subjective:       Patient ID: Irmgard OLAMIDE Pires is a 88 y.o. female.    Chief Complaint: No chief complaint on file.    Ms. Pires comes to clinic today for follow up and face to face encounter for home health. The patient is not very active and is unsteady on her feet. She is unable to leave the home without the assistance of her . The patient has chronic problems with swelling of her feet and legs. She is noncompliant with taking lasix and with wearing compression stockings. The patient is due for a flu shot today and agrees to receive this. The patient also needs a referral to dermatology for a skin lesion on her face.       Review of Systems   Constitutional: Negative for activity change, appetite change and fever.   HENT: Negative for postnasal drip, rhinorrhea and sinus pressure.    Eyes: Negative for visual disturbance.   Respiratory: Negative for cough and shortness of breath.    Cardiovascular: Positive for leg swelling. Negative for chest pain.   Gastrointestinal: Negative for abdominal distention and abdominal pain.   Genitourinary: Negative for difficulty urinating and dysuria.   Musculoskeletal: Negative for arthralgias and myalgias.   Neurological: Negative for headaches.   Hematological: Negative for adenopathy.   Psychiatric/Behavioral: The patient is not nervous/anxious.        Objective:      Physical Exam   Constitutional: She is oriented to person, place, and time.   Cardiovascular: Normal rate and regular rhythm.    Pulmonary/Chest: Effort normal and breath sounds normal. She has no wheezes.   Abdominal: Soft. Bowel sounds are normal. There is no tenderness.   Musculoskeletal: She exhibits edema.   Bilateral distal lower extremity edema   Neurological: She is alert and oriented to person, place, and time.   Skin: No erythema.   Psychiatric: Her behavior is normal.       Assessment:       1. Essential hypertension    2. Primary osteoarthritis of both knees    3. Bilateral lower extremity  edema    4. Leg swelling    5. Severe obesity (BMI 35.0-39.9) with comorbidity    6. Debility    7. Need for influenza vaccination    8. Skin lesion        Plan:     Diagnoses and all orders for this visit:    Essential hypertension  -     Ambulatory referral to Home Health  controlled  Primary osteoarthritis of both knees  -     Ambulatory referral to Home Health    Bilateral lower extremity edema  -     furosemide (LASIX) 20 MG tablet; Take one half to one pill every other day as needed for leg swelling.  -     Ambulatory referral to Home Health    Leg swelling  -     furosemide (LASIX) 20 MG tablet; Take one half to one pill every other day as needed for leg swelling.  -     Ambulatory referral to Home Health  Patient encouraged to be compliant with taking lasix  Severe obesity (BMI 35.0-39.9) with comorbidity  -     Ambulatory referral to Home Health  Low carbohydrate, high fiber diet  Increase exercise  Debility  -     Ambulatory referral to Home Health    Need for influenza vaccination  Flu shot given today  Skin lesion  -     Ambulatory referral to Dermatology

## 2017-11-03 NOTE — TELEPHONE ENCOUNTER
----- Message from Deisi Younger LPN sent at 10/25/2017  3:55 PM CDT -----  Contact: Gabriele Pires (Spouse)      ----- Message -----  From: Bobbi Tilley  Sent: 10/25/2017   1:18 PM  To: [facility] Staff    Needs home health orders    Call back

## 2017-11-08 ENCOUNTER — OFFICE VISIT (OUTPATIENT)
Dept: CARDIOLOGY | Facility: CLINIC | Age: 82
End: 2017-11-08
Payer: MEDICARE

## 2017-11-08 VITALS
DIASTOLIC BLOOD PRESSURE: 64 MMHG | HEIGHT: 59 IN | OXYGEN SATURATION: 93 % | BODY MASS INDEX: 38.71 KG/M2 | HEART RATE: 70 BPM | SYSTOLIC BLOOD PRESSURE: 121 MMHG | WEIGHT: 192 LBS

## 2017-11-08 DIAGNOSIS — I50.9 CONGESTIVE HEART FAILURE, UNSPECIFIED CONGESTIVE HEART FAILURE CHRONICITY, UNSPECIFIED CONGESTIVE HEART FAILURE TYPE: ICD-10-CM

## 2017-11-08 DIAGNOSIS — R94.31 ABNORMAL EKG: ICD-10-CM

## 2017-11-08 PROCEDURE — 99213 OFFICE O/P EST LOW 20 MIN: CPT | Mod: PBBFAC,PO,25 | Performed by: INTERNAL MEDICINE

## 2017-11-08 PROCEDURE — 93005 ELECTROCARDIOGRAM TRACING: CPT | Mod: PBBFAC,PO | Performed by: INTERNAL MEDICINE

## 2017-11-08 PROCEDURE — 99999 PR PBB SHADOW E&M-EST. PATIENT-LVL III: CPT | Mod: PBBFAC,,, | Performed by: INTERNAL MEDICINE

## 2017-11-08 PROCEDURE — 99204 OFFICE O/P NEW MOD 45 MIN: CPT | Mod: S$PBB,,, | Performed by: INTERNAL MEDICINE

## 2017-11-08 PROCEDURE — 93010 ELECTROCARDIOGRAM REPORT: CPT | Mod: ,,, | Performed by: INTERNAL MEDICINE

## 2017-11-08 NOTE — LETTER
November 8, 2017      Marquis Brush MD  [address]  [place] [address]           [facility] [facility]  [address]  [place] [address]  Phone: [phone]          Patient: Fracisco Pires   MR Number: [id number]   YOB: 1929   Date of Visit: 11/8/2017       Dear Dr. Marquis Brush:    Thank you for referring Fracisco Pires to me for evaluation. Attached you will find relevant portions of my assessment and plan of care.    If you have questions, please do not hesitate to call me. I look forward to following Fracisco Pires along with you.    Sincerely,    Neftali Sanders MD    Enclosure  CC:  No Recipients    If you would like to receive this communication electronically, please contact [email or web address] or [phone] to request more information on [employer] Link access.    For providers and/or their staff who would like to refer a patient to [facility], please contact us through our one-stop-shop provider referral line, [facility], at [phone].    If you feel you have received this communication in error or would no longer like to receive these types of communications, please e-mail [email or web address]

## 2017-11-08 NOTE — PROGRESS NOTES
[facility]    CC: Establish care    Patient ID: Irmggertrude Pires is a 88 y.o. female with a past medical history of diabetes, hypertension  HPI  She is overall stable from a cardiovascular perspective.  She has problems with her joints and hence uses a wheelchair primarily for mobilization.  She denies any chest pain, shortness of breath, orthopnea, PND, syncope or presyncope.    Past Medical History:   Diagnosis Date    Arthritis     Asthma     recent bronchits treated and resoved with Levaquin. Last Nov 2011.    Cataract     ou done//    Diabetes mellitus     diet controlled    GERD (gastroesophageal reflux disease)     Hypertension     Phlebitis and thrombophlebitis of the leg 2005 before     Past Surgical History:   Procedure Laterality Date    CATARACT EXTRACTION      os d 11/6/13// od d 4/16/14//    EYE SURGERY      both eyes    JOINT REPLACEMENT      bilateral knee replacement     Social History     Social History    Marital status:      Spouse name: N/A    Number of children: N/A    Years of education: N/A     Occupational History     Retired     Social History Main Topics    Smoking status: Former Smoker     Packs/day: 1.50     Years: 50.00     Types: Cigarettes     Quit date: 12/8/1990    Smokeless tobacco: Not on file    Alcohol use 8.4 oz/week     14 Glasses of wine per week      Comment: 1-3 glasses of wine daily    Drug use: No    Sexual activity: Not Currently     Other Topics Concern    Not on file     Social History Narrative    No narrative on file     Family History   Problem Relation Age of Onset    Stroke Mother     Heart disease Father     Amblyopia Neg Hx     Blindness Neg Hx     Cancer Neg Hx     Cataracts Neg Hx     Diabetes Neg Hx     Glaucoma Neg Hx     Hypertension Neg Hx     Macular degeneration Neg Hx     Retinal detachment Neg Hx     Strabismus Neg Hx     Thyroid disease Neg Hx        Review of patient's allergies indicates:  "  Allergen Reactions    No known drug allergies        Medication List with Changes/Refills   Current Medications    ASPIRIN (ECOTRIN) 81 MG EC TABLET    Take 81 mg by mouth once daily.    FUROSEMIDE (LASIX) 20 MG TABLET    Take one half to one pill every other day as needed for leg swelling.    HYDROCODONE-ACETAMINOPHEN 5-325MG (NORCO) 5-325 MG PER TABLET    Take 1 tablet by mouth every 4 (four) hours as needed for Pain.    LISINOPRIL (PRINIVIL,ZESTRIL) 2.5 MG TABLET    Take 1 tablet (2.5 mg total) by mouth once daily.    METOPROLOL SUCCINATE (TOPROL-XL) 25 MG 24 HR TABLET    Take 1 tablet (25 mg total) by mouth once daily.    MOMETASONE 0.1% (ELOCON) 0.1 % CREAM    Apply topically once daily.    POTASSIUM CHLORIDE SA (K-DUR,KLOR-CON) 20 MEQ TABLET    Take 1 tablet (20 mEq total) by mouth once daily.       Review of Systems  Constitution: Denies chills, fever, and sweats.  HENT: Denies headaches or blurry vision.  Cardiovascular: Denies chest pain or irregular heart beat.  Respiratory: Denies cough or shortness of breath.  Gastrointestinal: Denies abdominal pain, nausea, or vomiting.  Musculoskeletal: Denies muscle cramps.  Neurological: Denies dizziness or focal weakness.  Psychiatric/Behavioral: Normal mental status.  Hematologic/Lymphatic: Denies bleeding problem or easy bruising/bleeding.  Skin: Denies rash or suspicious lesions    Physical Examination  /64 (BP Location: Left arm, Patient Position: Sitting)   Pulse 70   Ht 4' 11" (1.499 m)   Wt 87.1 kg (192 lb 0.3 oz)   LMP 12/08/1978   SpO2 (!) 93%   BMI 38.78 kg/m²     Constitutional: No acute distress, conversant  HEENT: Sclera anicteric, Pupils equal, round and reactive to light, extraocular motions intact, Oropharynx clear  Neck: No JVD, no carotid bruits  Cardiovascular: regular rate and rhythm, no murmur, rubs or gallops, normal S1/S2  Pulmonary: Clear to auscultation bilaterally  Abdominal: Abdomen soft, nontender, nondistended, positive " bowel sounds  Extremities: No lower extremity edema,   Pulses:  Carotid pulses are 2+ on the right side, and 2+ on the left side.  Radial pulses are 2+ on the right side, and 2+ on the left side.   .    Skin: No ecchymosis, erythema, or ulcers  Psych: Alert and oriented x 3, appropriate affect  Neuro: CNII-XII intact, no focal deficits    Labs:  Most Recent Data  CBC:   Lab Results   Component Value Date    WBC 5.95 04/18/2017    HGB 13.5 04/18/2017    HCT 40.3 04/18/2017     04/18/2017    MCV 95 04/18/2017    RDW 14.2 04/18/2017     BMP:   Lab Results   Component Value Date     07/19/2017    K 5.0 07/19/2017     07/19/2017    CO2 27 07/19/2017    BUN 18 07/19/2017    CREATININE 1.0 07/19/2017    GLU 91 07/19/2017    CALCIUM 9.5 07/19/2017    MG 2.0 08/02/2013    PHOS 2.8 08/02/2013     LFTS;   Lab Results   Component Value Date    PROT 7.7 07/19/2017    ALBUMIN 3.1 (L) 07/19/2017    BILITOT 0.6 07/19/2017    AST 24 07/19/2017    ALKPHOS 78 07/19/2017    ALT 11 07/19/2017     COAGS: No results found for: INR, PROTIME, PTT  FLP:   Lab Results   Component Value Date    CHOL 140 11/27/2014    HDL 55 11/27/2014    LDLCALC 75.4 11/27/2014    TRIG 48 11/27/2014    CHOLHDL 39.3 11/27/2014     CARDIAC:   Lab Results   Component Value Date     (H) 07/19/2017       Imaging:    EKG: Sinus rhythm.  Left bundle branch block.  This is an old finding    Echo: 2016  CONCLUSIONS     1 - Normal left ventricular systolic function (EF 55-60%).     2 - Left ventricular diastolic dysfunction.     3 - The estimated PA systolic pressure is 17 mmHg.     4 - Small pericardial effusion.     5 - Very difficult windows just like the last Echo in 7/2013. Limited info, all measurements are gross estimates, at minimum need to consider use of Optison contrast or cardiac MR.   Stress Testing:      I have personally reviewed these images and echo data    Assessment/Plan:  Diagnoses and all orders for this  visit:    Congestive heart failure, unspecified congestive heart failure chronicity, unspecified congestive heart failure type  -     EKG 12-lead    Abnormal EKG      She is currently stable from a cardiovascular perspective.  She was seen by her primary care physician and an echocardiography was requested in April.  sHe is due to get that Echo cardiography done soon.  Her  asked me whether it is safe for her to get a knee surgery done in case she needs one.  I opinion at her age she would definitely be an intermediate to high risk for general anesthesia and a knee surgery.. Definitely no prohibitive risk feature is that I can see right now.  Ever if she needs a risk stratification for a knee surgery we would be more than happy to evaluate her.  As far as I understand there has been no workup done for up possible knee surgery        No Follow-up on file.          Total duration of face to face visit time 45 minutes.  Total time spent counseling greater than fifty percent of total visit time.  Counseling included discussion regarding imaging findings, diagnosis, possibilities, treatment options, risks and benefits.  The patient had many questions regarding the options and long-term effect which were all answered to my best ability.      Neftali Sanders MD,MRCP,RPVI,FACC,FSCAI.  Interventional Cardiology   Phone [phone]

## 2017-11-09 ENCOUNTER — TELEPHONE (OUTPATIENT)
Dept: FAMILY MEDICINE | Facility: CLINIC | Age: 82
End: 2017-11-09

## 2017-11-09 NOTE — TELEPHONE ENCOUNTER
Received fax today requesting order for wheelchair. Orders signed by Dr. Brush and faxed back to [facility] at [phone]. Fax confirmation received.

## 2017-11-09 NOTE — TELEPHONE ENCOUNTER
----- Message from Dulce Mccarty sent at 11/9/2017 11:15 AM CST -----  Contact: [facility]- [facility]  Faxed over DME order for wheelchair on 11/7 and needs to know your decision. Please fax to Fax [phone] ph [phone]

## 2018-01-04 ENCOUNTER — TELEPHONE (OUTPATIENT)
Dept: FAMILY MEDICINE | Facility: CLINIC | Age: 83
End: 2018-01-04

## 2018-01-04 DIAGNOSIS — I10 ESSENTIAL HYPERTENSION: Chronic | ICD-10-CM

## 2018-01-04 DIAGNOSIS — E66.01 SEVERE OBESITY (BMI 35.0-39.9) WITH COMORBIDITY: Primary | ICD-10-CM

## 2018-01-04 DIAGNOSIS — I73.9 PVD (PERIPHERAL VASCULAR DISEASE): Chronic | ICD-10-CM

## 2018-01-04 NOTE — TELEPHONE ENCOUNTER
"Called Ophelia ([facility] Nurse). Fabian states Pts  called her to inform her pt had fainted, LOC for up to 3 minutes. Ophelia instructed  to take pt to ER to be evaluated due to her not having an history of fainting.  did not bring pt to be evaluated, states "she came too" so they didn't think she needed to be evaluated. Ophelia states she did discharge pt from Home Health.  Discussed with Ophelia need for an appt. Appt date, time, and location given to Ophelia. Informed Ophelia I will send this information to Dr. Brush for review and return her call with any additional information.     ----- Message from Mary Jolley sent at 1/4/2018 11:19 AM CST -----  Contact: Ophelia/[facility]/Ophelia states that patient passed out yesterday and need to speak to you.  Call [phone].    "

## 2018-01-08 NOTE — TELEPHONE ENCOUNTER
"Spoke to patient's  (Gabriele) who states patient is fine. States patient has not had any further episodes of fainting and has not been having any other health issues since that time. Patient has an appointment scheduled for today which was canceled by patient. Mr. Suazo states patient has difficulty getting into the vehicle and that is the reason the appointment was cancels. Mr. Suazo states " Young lady I can not make her do things she doesn't think she can do. This is normal for a person her age, it is normal for your health to decline. This is just a part of it. She has been gradually declining for the past year."  Mr. Suazo states patient is doing fine at this moment, he also thanked writer for concern and ended the call. Please be advised.   "

## 2018-01-10 ENCOUNTER — TELEPHONE (OUTPATIENT)
Dept: FAMILY MEDICINE | Facility: CLINIC | Age: 83
End: 2018-01-10

## 2018-01-10 ENCOUNTER — OUTPATIENT CASE MANAGEMENT (OUTPATIENT)
Dept: ADMINISTRATIVE | Facility: OTHER | Age: 83
End: 2018-01-10

## 2018-01-10 NOTE — PROGRESS NOTES
Thank you for the referral.  Patient has been assigned to ZACKERY Victor for low risk screening for Outpatient Case Management.     Reason for referral:  Severe obesity (BMI 35.0-39.9) with comorbidity  PVD (peripheral vascular disease)  Essential hypertension    Please contact [facility] at [phone]. [phone] with any questions.    Thank you,    Bianca Campbell

## 2018-01-10 NOTE — TELEPHONE ENCOUNTER
----- Message from Bianca Campbell sent at 1/10/2018 11:49 AM CST -----  Thank you for the referral.  Patient has been assigned to ZACKERY Victor for low risk screening for Outpatient Case Management.     Reason for referral:  Severe obesity (BMI 35.0-39.9) with comorbidity  PVD (peripheral vascular disease)  Essential hypertension    Please contact [facility] at [phone] with any questions.    Thank you,    Bianca Campbell

## 2018-01-10 NOTE — PROGRESS NOTES
This CSW received a referral on the above patient.   Reason for referral:Essential Hypertension  Name of the community resource that was provided:[facility],Emergency Alert  Resource given to: Patient  via US Mail and Telephone     This CSW completed assessment  with patient . Patient resides with spouse. Patient ambulates with a walker. Patient is interested in receiving resources for Emergency Alert System. Patient reports she is independent with her ADL's, however sometimes require assistance. Patient reports spouse assist with ADL's when needed. CSW provided  [facility] information to assist with additional personal care services in the home. Patient denied needing assistance with food , shelter, medication , or medical. Patient reports spouse assists with transportation to and from Dr's appointments. Patient does not have a POA on file, however patient reports daughter and spouse will make decisions if she is unable to. CSW mailed all resources and provided her contact information for any additional needs.

## 2018-01-10 NOTE — TELEPHONE ENCOUNTER
----- Message from ZACKERY Kessler sent at 1/10/2018  2:07 PM CST -----  This CSW received a referral on the above patient. This CSW provided patient with the following resource:[facility],Emergency Alert. The resource can be located within [facility] under the Care category.    Thank you for the referral,    ZACKERY Victor

## 2018-01-19 ENCOUNTER — TELEPHONE (OUTPATIENT)
Dept: FAMILY MEDICINE | Facility: CLINIC | Age: 83
End: 2018-01-19

## 2018-04-10 ENCOUNTER — TELEPHONE (OUTPATIENT)
Dept: FAMILY MEDICINE | Facility: CLINIC | Age: 83
End: 2018-04-10

## 2018-04-10 DIAGNOSIS — N39.0 URINARY TRACT INFECTION WITHOUT HEMATURIA, SITE UNSPECIFIED: Primary | ICD-10-CM

## 2018-04-10 NOTE — TELEPHONE ENCOUNTER
Please see attached message from Nidia with [facility]. Spoke to Dr. Brush regarding, advised for patient to repeat UA C&S one week upon the completion of antibiotic therapy. Mrs. Jacob notified. Verbalized understanding.

## 2018-04-10 NOTE — TELEPHONE ENCOUNTER
----- Message from Halley Garcia sent at 4/10/2018 11:16 AM CDT -----  Contact: Nidia with [facility] [phone]  Nidia with [facility] [phone] calling because patient was recently in the hospital and she had a UTI and will be finishing her antibiotics this week, so she wanted to know if she should collect a urinalysis sample to make sure it is cleared up. Please advise. Thanks.

## 2018-04-19 ENCOUNTER — TELEPHONE (OUTPATIENT)
Dept: FAMILY MEDICINE | Facility: CLINIC | Age: 83
End: 2018-04-19

## 2018-04-19 DIAGNOSIS — N39.0 RECURRENT UTI: Primary | ICD-10-CM

## 2018-04-19 NOTE — TELEPHONE ENCOUNTER
Received urine culture results via fax from [facility]. Results placed on Dr. Brush's desk for review. Please advise.

## 2018-04-20 RX ORDER — SULFAMETHOXAZOLE AND TRIMETHOPRIM 800; 160 MG/1; MG/1
1 TABLET ORAL 2 TIMES DAILY
Qty: 20 TABLET | Refills: 0 | Status: SHIPPED | OUTPATIENT
Start: 2018-04-20 | End: 2019-05-24 | Stop reason: ALTCHOICE

## 2018-04-20 NOTE — TELEPHONE ENCOUNTER
Patient of 88 y/o female with recurrent uti with suprapubic tenderness, no fevers no hematuria.  E coli > 100,00 colonies. Spoken w/ . Bactrim DS bid , and recommenced to increase fluids.

## 2018-05-10 ENCOUNTER — TELEPHONE (OUTPATIENT)
Dept: FAMILY MEDICINE | Facility: CLINIC | Age: 83
End: 2018-05-10

## 2018-05-10 NOTE — TELEPHONE ENCOUNTER
Called pt regarding below message. Pt states she was recently released from [facility]. Informed pt she will need a hospital f/u appt. Pt states she is also having concerns for her LE. Pt reports swelling and discomfort to lower leg. Informed pt she will need two appts. Scheduled appts with pt. Appt date, time, and location given. Informed pt I have submitted a ADELA to [facility] to obtain her records from admission. Pt verbalized understanding with no further questions.     ----- Message from Adelso Whyte sent at 5/10/2018  2:09 PM CDT -----  Contact: Patient  Type:  Sooner Apoointment Request    Caller is requesting a sooner appointment.  Caller declined first available appointment listed below.  Caller will not accept being placed on the waitlist and is requesting a message be sent to doctor.    Name of Caller:  Fracisco  When is the first available appointment?  11/18  Symptoms:  Hospital f/u, congestive heart failure, UTI  Best Call Back Number:  [phone] or Zoraida [phone]  Additional Information:  Was discharged from the hospital about a week ago.

## 2018-05-14 ENCOUNTER — DOCUMENTATION ONLY (OUTPATIENT)
Dept: FAMILY MEDICINE | Facility: CLINIC | Age: 83
End: 2018-05-14

## 2018-05-14 ENCOUNTER — TELEPHONE (OUTPATIENT)
Dept: FAMILY MEDICINE | Facility: CLINIC | Age: 83
End: 2018-05-14

## 2018-05-14 NOTE — TELEPHONE ENCOUNTER
----- Message from Ambrosio Harvey sent at 5/14/2018 11:59 AM CDT -----  Contact: Edwigedenzel  Calling to get a cortisone injection in her knee. She has not been able to put pressure on it, but is now getting worse. Please call her [phone] (home)   Thanks!

## 2018-05-14 NOTE — PROGRESS NOTES
Pre-Visit Chart Review  For Appointment Scheduled on 5/14/2018    Health Maintenance Due   Topic Date Due    TETANUS VACCINE  03/08/1947    Zoster Vaccine  03/08/1989

## 2018-05-16 ENCOUNTER — OFFICE VISIT (OUTPATIENT)
Dept: ORTHOPEDICS | Facility: CLINIC | Age: 83
End: 2018-05-16
Payer: MEDICARE

## 2018-05-16 VITALS — WEIGHT: 192 LBS | BODY MASS INDEX: 38.71 KG/M2 | HEIGHT: 59 IN

## 2018-05-16 DIAGNOSIS — Z96.659 PAINFUL TOTAL KNEE REPLACEMENT, SUBSEQUENT ENCOUNTER: Primary | ICD-10-CM

## 2018-05-16 DIAGNOSIS — T84.84XD PAINFUL TOTAL KNEE REPLACEMENT, SUBSEQUENT ENCOUNTER: Primary | ICD-10-CM

## 2018-05-16 PROCEDURE — 99213 OFFICE O/P EST LOW 20 MIN: CPT | Mod: S$PBB,,, | Performed by: ORTHOPAEDIC SURGERY

## 2018-05-16 PROCEDURE — 99213 OFFICE O/P EST LOW 20 MIN: CPT | Mod: PBBFAC,PN | Performed by: ORTHOPAEDIC SURGERY

## 2018-05-16 PROCEDURE — 99999 PR PBB SHADOW E&M-EST. PATIENT-LVL III: CPT | Mod: PBBFAC,,, | Performed by: ORTHOPAEDIC SURGERY

## 2018-05-16 RX ORDER — ATORVASTATIN CALCIUM 20 MG/1
TABLET, FILM COATED ORAL
COMMUNITY
Start: 2018-05-04 | End: 2018-07-09 | Stop reason: SDUPTHER

## 2018-05-16 RX ORDER — CLOPIDOGREL BISULFATE 75 MG/1
TABLET ORAL
COMMUNITY
Start: 2018-05-04 | End: 2018-05-30 | Stop reason: SDUPTHER

## 2018-05-16 NOTE — LETTER
May 19, 2018      Marquis Brush MD  [address]  [place] [address]           [address] [facility] [address]  [place] [address]  Phone: [phone]          Patient: Fracisco Pires   MR Number: [id number]   YOB: 1929   Date of Visit: 5/16/2018       Dear Dr. Marquis Brush:    Thank you for referring Fracisco Pires to me for evaluation. Attached you will find relevant portions of my assessment and plan of care.    If you have questions, please do not hesitate to call me. I look forward to following Fracisco Pires along with you.    Sincerely,        Enclosure  CC:  No Recipients    If you would like to receive this communication electronically, please contact [email or web address] or [phone] to request more information on [employer] Link access.    For providers and/or their staff who would like to refer a patient to [facility], please contact us through our one-stop-shop provider referral line, [facility], at [phone].    If you feel you have received this communication in error or would no longer like to receive these types of communications, please e-mail [email or web address]

## 2018-05-25 NOTE — PROGRESS NOTES
Past Medical History:   Diagnosis Date    Arthritis     Asthma     recent bronchits treated and resoved with Levaquin. Last Nov 2011.    Cataract     ou done//    Diabetes mellitus     diet controlled    GERD (gastroesophageal reflux disease)     Hypertension     Phlebitis and thrombophlebitis of the leg 2005 before       Past Surgical History:   Procedure Laterality Date    CATARACT EXTRACTION      os d 11/6/13// od d 4/16/14//    EYE SURGERY      both eyes    JOINT REPLACEMENT      bilateral knee replacement       Current Outpatient Prescriptions   Medication Sig    aspirin (ECOTRIN) 81 MG EC tablet Take 81 mg by mouth once daily.    atorvastatin (LIPITOR) 20 MG tablet     clopidogrel (PLAVIX) 75 mg tablet     furosemide (LASIX) 20 MG tablet Take one half to one pill every other day as needed for leg swelling.    hydrocodone-acetaminophen 5-325mg (NORCO) 5-325 mg per tablet Take 1 tablet by mouth every 4 (four) hours as needed for Pain.    lisinopril (PRINIVIL,ZESTRIL) 2.5 MG tablet Take 1 tablet (2.5 mg total) by mouth once daily.    metoprolol succinate (TOPROL-XL) 25 MG 24 hr tablet Take 1 tablet (25 mg total) by mouth once daily.    potassium chloride SA (K-DUR,KLOR-CON) 20 MEQ tablet Take 1 tablet (20 mEq total) by mouth once daily.    sulfamethoxazole-trimethoprim 800-160mg (BACTRIM DS) 800-160 mg Tab Take 1 tablet by mouth 2 (two) times daily.    mometasone 0.1% (ELOCON) 0.1 % cream Apply topically once daily.     No current facility-administered medications for this visit.        Review of patient's allergies indicates:   Allergen Reactions    No known drug allergies        Family History   Problem Relation Age of Onset    Stroke Mother     Heart disease Father     Amblyopia Neg Hx     Blindness Neg Hx     Cancer Neg Hx     Cataracts Neg Hx     Diabetes Neg Hx     Glaucoma Neg Hx     Hypertension Neg Hx     Macular degeneration Neg Hx     Retinal detachment Neg Hx      "Strabismus Neg Hx     Thyroid disease Neg Hx        Social History     Social History    Marital status:      Spouse name: N/A    Number of children: N/A    Years of education: N/A     Occupational History     Retired     Social History Main Topics    Smoking status: Former Smoker     Packs/day: 1.50     Years: 50.00     Types: Cigarettes     Quit date: 12/8/1990    Smokeless tobacco: Not on file    Alcohol use 8.4 oz/week     14 Glasses of wine per week      Comment: 1-3 glasses of wine daily    Drug use: No    Sexual activity: Not Currently     Other Topics Concern    Not on file     Social History Narrative    No narrative on file       Chief Complaint:   Chief Complaint   Patient presents with    Left Knee - Pain       Consulting Physician: Marquis Brush MD    History of present illness:    This is a 89 y.o. year old female who complains of left knee pain.  She states that she had a total knee done approximately 15 or more years ago on both sides but that the left one has been bothering her for the last few years.  She states it is getting worse.  She states the pain in her left knee is so severe she is unable to walk on it.  She puts it at a 2-9 out of 10.  She uses a wheelchair at this time because her knee hurts so much.    Review of Systems:    Constitution: Denies chills, fever, and sweats.  HENT: Denies headaches or blurry vision.  Cardiovascular: Denies chest pain or irregular heart beat.  Respiratory: Denies cough or shortness of breath.  Gastrointestinal: Denies abdominal pain, nausea, or vomiting.  Musculoskeletal:  Denies muscle cramps.  Neurological: Denies dizziness or focal weakness.  Psychiatric/Behavioral: Normal mental status.  Hematologic/Lymphatic: Denies bleeding problem or easy bruising/bleeding.  Skin: Denies rash or suspicious lesions.    Examination:    Vital Signs:    Vitals:    05/16/18 1503   Weight: 87.1 kg (192 lb 0.3 oz)   Height: 4' 11" (1.499 m)   PainSc:   2 "   PainLoc: Knee       Body mass index is 38.78 kg/m².    This a well-developed, well nourished patient in no acute distress.    Alert and oriented and cooperative to examination.       Physical Exam: Left Knee Exam    Gait   Antalgic    Skin  Rash:   None  Scars:   None    Inspection  Erythema:  None  Bruising:  None  Effusion:  None  Masses:  None  Lymphadenopathy: None    Range of Motion: 0 to 90°    Medial Joint : y  Lateral Joint : y    Patellofemoral Tenderness: Yes  Patellofemoral Crepitus: Yes    Varus Stress:  Stable  Valgus Stress:  Stable    Strength:  5/5    Pulses:  Palpable  Sensation:  Intact          Imaging: X-rays of the left knee show a collapse of the medial femoral condyle throwing the knee into varus.        Assessment: Painful total knee replacement, subsequent encounter        Plan:  We discussed treatment options and explained that the best course of action would be a revision total knee arthroplasty.  She is not interested in this so we will send her to Dr. Stein for possible nerve ablation.    DISCLAIMER: This note may have been dictated using voice recognition software and may contain grammatical errors.     NOTE: Consult report sent to referring provider via [employer].

## 2018-05-30 RX ORDER — CLOPIDOGREL BISULFATE 75 MG/1
75 TABLET ORAL DAILY
Qty: 30 TABLET | Refills: 6 | Status: SHIPPED | OUTPATIENT
Start: 2018-05-30 | End: 2018-12-05 | Stop reason: SDUPTHER

## 2018-06-28 ENCOUNTER — TELEPHONE (OUTPATIENT)
Dept: ADMINISTRATIVE | Facility: CLINIC | Age: 83
End: 2018-06-28

## 2018-06-28 NOTE — PROGRESS NOTES
Home Health Recert 05/25/2018 to 07/23/2018 with [facility].Dr Marquis Brush.  Services.

## 2018-07-09 RX ORDER — ATORVASTATIN CALCIUM 20 MG/1
20 TABLET, FILM COATED ORAL DAILY
Qty: 90 TABLET | Refills: 3 | Status: SHIPPED | OUTPATIENT
Start: 2018-07-09 | End: 2018-07-11 | Stop reason: SDUPTHER

## 2018-07-11 RX ORDER — ATORVASTATIN CALCIUM 20 MG/1
20 TABLET, FILM COATED ORAL DAILY
Qty: 90 TABLET | Refills: 3 | Status: SHIPPED | OUTPATIENT
Start: 2018-07-11 | End: 2018-12-05 | Stop reason: SDUPTHER

## 2018-09-28 ENCOUNTER — TELEPHONE (OUTPATIENT)
Dept: FAMILY MEDICINE | Facility: CLINIC | Age: 83
End: 2018-09-28

## 2018-09-28 DIAGNOSIS — M17.0 PRIMARY OSTEOARTHRITIS OF BOTH KNEES: Primary | ICD-10-CM

## 2018-09-28 DIAGNOSIS — J45.42 MODERATE PERSISTENT ASTHMA WITH STATUS ASTHMATICUS: Chronic | ICD-10-CM

## 2018-09-28 DIAGNOSIS — I73.9 PVD (PERIPHERAL VASCULAR DISEASE): Chronic | ICD-10-CM

## 2018-09-28 NOTE — TELEPHONE ENCOUNTER
----- Message from Nina Jarrett sent at 9/28/2018 11:01 AM CDT -----  Contact:    - Gabriele Pires [phone] is calling to speak with Dr Brush/this is the only information  would give to me/please call

## 2018-10-01 ENCOUNTER — TELEPHONE (OUTPATIENT)
Dept: FAMILY MEDICINE | Facility: CLINIC | Age: 83
End: 2018-10-01

## 2018-10-01 NOTE — TELEPHONE ENCOUNTER
----- Message from Tess Oviedo sent at 10/1/2018 11:42 AM CDT -----  Contact: Greer with [facility]  Patient needs a face-to-face visit with the doctor and she is requesting a possible home visit with the patient.  Call Back#[phone]  Thanks

## 2018-10-04 ENCOUNTER — TELEPHONE (OUTPATIENT)
Dept: FAMILY MEDICINE | Facility: CLINIC | Age: 83
End: 2018-10-04

## 2018-10-04 NOTE — TELEPHONE ENCOUNTER
A Nurse practioner is going to do the Face to Face for Mrs. Thomason.  I spoke to Stefani Millard and this has been scheduled.

## 2018-10-04 NOTE — TELEPHONE ENCOUNTER
----- Message from Harish Medrano sent at 10/4/2018 10:50 AM CDT -----  Contact: Greer pt's Home health nurse ([facility])  She's calling in regards to if the dr can make a home visit to this pt, she states this is her 2nd message and has not heard from anyone concerning this, ph# [phone] (cell)

## 2018-10-08 ENCOUNTER — TELEPHONE (OUTPATIENT)
Dept: ADMINISTRATIVE | Facility: CLINIC | Age: 83
End: 2018-10-08

## 2018-10-08 ENCOUNTER — LAB VISIT (OUTPATIENT)
Dept: LAB | Facility: HOSPITAL | Age: 83
End: 2018-10-08
Attending: INTERNAL MEDICINE
Payer: MEDICARE

## 2018-10-08 DIAGNOSIS — I25.2 OLD MYOCARDIAL INFARCTION: Primary | ICD-10-CM

## 2018-10-08 DIAGNOSIS — I73.9 PERIPHERAL VASCULAR DISEASE, UNSPECIFIED: ICD-10-CM

## 2018-10-08 LAB
ANION GAP SERPL CALC-SCNC: 8 MMOL/L
BASOPHILS # BLD AUTO: 0.07 K/UL
BASOPHILS NFR BLD: 1.4 %
BNP SERPL-MCNC: 223 PG/ML
BUN SERPL-MCNC: 13 MG/DL
CALCIUM SERPL-MCNC: 9.6 MG/DL
CHLORIDE SERPL-SCNC: 103 MMOL/L
CO2 SERPL-SCNC: 28 MMOL/L
CREAT SERPL-MCNC: 0.9 MG/DL
DIFFERENTIAL METHOD: ABNORMAL
EOSINOPHIL # BLD AUTO: 0.2 K/UL
EOSINOPHIL NFR BLD: 3.4 %
ERYTHROCYTE [DISTWIDTH] IN BLOOD BY AUTOMATED COUNT: 14.4 %
EST. GFR  (AFRICAN AMERICAN): >60 ML/MIN/1.73 M^2
EST. GFR  (NON AFRICAN AMERICAN): 56.9 ML/MIN/1.73 M^2
GLUCOSE SERPL-MCNC: 120 MG/DL
HCT VFR BLD AUTO: 45.9 %
HGB BLD-MCNC: 14.1 G/DL
IMM GRANULOCYTES # BLD AUTO: 0 K/UL
IMM GRANULOCYTES NFR BLD AUTO: 0 %
LYMPHOCYTES # BLD AUTO: 1.4 K/UL
LYMPHOCYTES NFR BLD: 29.1 %
MCH RBC QN AUTO: 31.4 PG
MCHC RBC AUTO-ENTMCNC: 30.7 G/DL
MCV RBC AUTO: 102 FL
MONOCYTES # BLD AUTO: 0.5 K/UL
MONOCYTES NFR BLD: 10.9 %
NEUTROPHILS # BLD AUTO: 2.7 K/UL
NEUTROPHILS NFR BLD: 55.2 %
NRBC BLD-RTO: 0 /100 WBC
PLATELET # BLD AUTO: 243 K/UL
PMV BLD AUTO: 10.1 FL
POTASSIUM SERPL-SCNC: 3.8 MMOL/L
RBC # BLD AUTO: 4.49 M/UL
SODIUM SERPL-SCNC: 139 MMOL/L
WBC # BLD AUTO: 4.94 K/UL

## 2018-10-08 PROCEDURE — 83880 ASSAY OF NATRIURETIC PEPTIDE: CPT

## 2018-10-08 PROCEDURE — 80048 BASIC METABOLIC PNL TOTAL CA: CPT

## 2018-10-08 PROCEDURE — 85025 COMPLETE CBC W/AUTO DIFF WBC: CPT

## 2018-10-08 NOTE — TELEPHONE ENCOUNTER
Home Health SOC with [facility] [place]-Dr. Marquis Brush. Pt received SN, PT, MSW and HHA services.

## 2018-12-03 ENCOUNTER — TELEPHONE (OUTPATIENT)
Dept: FAMILY MEDICINE | Facility: CLINIC | Age: 83
End: 2018-12-03

## 2018-12-03 NOTE — TELEPHONE ENCOUNTER
----- Message from Yanely Brown sent at 12/3/2018  8:59 AM CST -----    Pt friend Zoraida  Is calling  To  Speak to the  Nurse  About  today's  Luda  // please call  For  Details [phone]

## 2018-12-05 ENCOUNTER — OFFICE VISIT (OUTPATIENT)
Dept: FAMILY MEDICINE | Facility: CLINIC | Age: 83
End: 2018-12-05
Payer: MEDICARE

## 2018-12-05 DIAGNOSIS — M79.89 LEG SWELLING: ICD-10-CM

## 2018-12-05 DIAGNOSIS — I50.32 CHRONIC DIASTOLIC CONGESTIVE HEART FAILURE: Chronic | ICD-10-CM

## 2018-12-05 DIAGNOSIS — M19.90 ARTHRITIS: ICD-10-CM

## 2018-12-05 DIAGNOSIS — N18.30 CKD (CHRONIC KIDNEY DISEASE) STAGE 3, GFR 30-59 ML/MIN: Chronic | ICD-10-CM

## 2018-12-05 DIAGNOSIS — E78.5 HYPERLIPIDEMIA LDL GOAL <130: Primary | ICD-10-CM

## 2018-12-05 DIAGNOSIS — M17.0 PRIMARY OSTEOARTHRITIS OF BOTH KNEES: Primary | ICD-10-CM

## 2018-12-05 DIAGNOSIS — H90.3 SENSORINEURAL HEARING LOSS (SNHL) OF BOTH EARS: Chronic | ICD-10-CM

## 2018-12-05 DIAGNOSIS — I10 ESSENTIAL HYPERTENSION: Chronic | ICD-10-CM

## 2018-12-05 DIAGNOSIS — R60.0 BILATERAL LOWER EXTREMITY EDEMA: ICD-10-CM

## 2018-12-05 DIAGNOSIS — I73.9 PVD (PERIPHERAL VASCULAR DISEASE): Chronic | ICD-10-CM

## 2018-12-05 DIAGNOSIS — I13.0 HEART FAILURE AND KIDNEY DISEASE DUE TO HIGH BLOOD PRESSURE: ICD-10-CM

## 2018-12-05 PROCEDURE — 99350 HOME/RES VST EST HIGH MDM 60: CPT | Mod: ,,, | Performed by: FAMILY MEDICINE

## 2018-12-05 RX ORDER — ATORVASTATIN CALCIUM 20 MG/1
10 TABLET, FILM COATED ORAL DAILY
Qty: 15 TABLET | Refills: 3 | Status: SHIPPED | OUTPATIENT
Start: 2018-12-05 | End: 2019-08-30

## 2018-12-05 RX ORDER — FUROSEMIDE 20 MG/1
TABLET ORAL
Qty: 30 TABLET | Refills: 5 | Status: SHIPPED | OUTPATIENT
Start: 2018-12-05 | End: 2019-06-10 | Stop reason: SDUPTHER

## 2018-12-05 RX ORDER — CLOPIDOGREL BISULFATE 75 MG/1
75 TABLET ORAL DAILY
Qty: 30 TABLET | Refills: 5 | Status: SHIPPED | OUTPATIENT
Start: 2018-12-05 | End: 2019-06-10 | Stop reason: SDUPTHER

## 2018-12-05 RX ORDER — POTASSIUM CHLORIDE 20 MEQ/1
20 TABLET, EXTENDED RELEASE ORAL EVERY OTHER DAY
Qty: 15 TABLET | Refills: 3 | Status: SHIPPED | OUTPATIENT
Start: 2018-12-05 | End: 2019-03-07 | Stop reason: SDUPTHER

## 2018-12-10 NOTE — PROGRESS NOTES
Subjective:       Patient ID: Fracisco Pires is a 89 y.o. female.    Chief Complaint: No chief complaint on file.    Ms. Pires comes to Home visit today for follow up. She complains of leg swelling. Ms Thomason has chronic leg swelling. She stop her refills on her medication. The patient and her  do admit that they consume a lot of canned foods because they do not cook anymore. The patient understanding that there is a lot of salt in these foods. The patient does not wear compression stockings and she refuses to do so.       Review of Systems   Constitutional: Negative for activity change, appetite change and fever.   HENT: Negative for postnasal drip, rhinorrhea and sinus pressure.    Eyes: Negative for visual disturbance.   Respiratory: Negative for cough and shortness of breath.    Cardiovascular: Positive for leg swelling. Negative for chest pain.   Gastrointestinal: Negative for abdominal distention and abdominal pain.   Neurological: Negative for headaches.   Hematological: Negative for adenopathy.   Psychiatric/Behavioral: The patient is not nervous/anxious.        Objective:      Physical Exam   Constitutional: She is oriented to person, place, and time.   Cardiovascular: Normal rate and regular rhythm.   Pulmonary/Chest: Effort normal and breath sounds normal. She has no wheezes.   Abdominal: Soft. Bowel sounds are normal. There is no tenderness.   Musculoskeletal: She exhibits edema.   Bilateral distal lower extremity edema   Neurological: She is alert and oriented to person, place, and time.   Skin: No erythema.   Psychiatric: Her behavior is normal.       Assessment:       1. Hyperlipidemia LDL goal <130    2. Leg swelling    3. Bilateral lower extremity edema    4. PVD (peripheral vascular disease)    5. Essential hypertension        Plan:   Fracisco was seen today for follow-up.    Diagnoses and all orders for this visit:    PVD (peripheral vascular disease)  -     potassium chloride SA  (K-DUR,KLOR-CON) 20 MEQ tablet; Take 1 tablet (20 mEq total) by mouth once daily.    Leg swelling  -     furosemide (LASIX) 20 MG tablet; Take one pill every other day as needed for leg swelling.  -     potassium chloride SA (K-DUR,KLOR-CON) 20 MEQ tablet; Take 1 tablet (20 mEq total) by mouth once daily.  -     Comprehensive metabolic panel; Future  -     Brain natriuretic peptide; Future  Elevate legs when possible  Low salt diet  Compression stockings encouraged  Follow up in 2 weeks.   Essential hypertension  -     potassium chloride SA (K-DUR,KLOR-CON) 20 MEQ tablet; Take 1 tablet (20 mEq total) by mouth once daily.  -     Comprehensive metabolic panel; Future  -     Brain natriuretic peptide; Future  Controlled  Low salt diet

## 2018-12-12 VITALS — HEART RATE: 66 BPM | SYSTOLIC BLOOD PRESSURE: 130 MMHG | OXYGEN SATURATION: 94 % | DIASTOLIC BLOOD PRESSURE: 90 MMHG

## 2018-12-12 PROBLEM — N18.30 CKD (CHRONIC KIDNEY DISEASE) STAGE 3, GFR 30-59 ML/MIN: Chronic | Status: ACTIVE | Noted: 2018-12-12

## 2018-12-12 PROBLEM — H90.3 SENSORINEURAL HEARING LOSS (SNHL) OF BOTH EARS: Chronic | Status: ACTIVE | Noted: 2018-12-12

## 2018-12-12 PROBLEM — I50.32 CHRONIC DIASTOLIC CONGESTIVE HEART FAILURE: Chronic | Status: ACTIVE | Noted: 2018-12-12

## 2018-12-12 NOTE — PROGRESS NOTES
Subjective:       Patient ID: Irmgard OLAMIDE Pires is a 89 y.o. female.    Chief Complaint: No chief complaint on file.    Home Visit  Ms. Pires  follow up. She complains of bilateral leg swelling. The patient has chronic edema and short of breath. She she failed to inquire for refills on her diuretics. The patient and her  do admit that they consume a lot of canned foods because they do not cook anymore. The patient understanding that there is a lot of salt in these foods. The patient does not wear compression stockings and she refuses to do so.  Patient walks barefoot at home.  Patient is oriented to person and to space.  He is not oriented to time nor current events.  She has impairment with  Movement, she [place] and also the need for rolling walker.      Review of Systems   Constitutional: Negative for activity change, appetite change and fever.   HENT: Positive for hearing loss. Negative for postnasal drip, rhinorrhea and sinus pressure.    Eyes: Negative for visual disturbance.   Respiratory: Negative for cough and shortness of breath.    Cardiovascular: Positive for leg swelling. Negative for chest pain.   Gastrointestinal: Negative for abdominal distention and abdominal pain.   Musculoskeletal: Positive for arthralgias, back pain, gait problem, joint swelling, myalgias and neck stiffness.   Neurological: Negative for headaches.   Hematological: Negative for adenopathy.   Psychiatric/Behavioral: Positive for agitation, confusion and decreased concentration. The patient is nervous/anxious.        Objective:      Physical Exam   Constitutional: She is oriented to person, place, and time.   Chronically ill moderate obese mi tachypnea   Cardiovascular: Normal rate and regular rhythm.   Pulmonary/Chest: Effort normal and breath sounds normal. She has no wheezes.   Abdominal: Soft. Bowel sounds are normal. There is no tenderness.   Musculoskeletal: She exhibits edema.    Bilateral distal lower extremity edema   Neurological: She is alert and oriented to person, place, and time.   Skin: No erythema.   Psychiatric: Her behavior is normal.       Assessment:        1. Primary osteoarthritis of both knees    2. CKD (chronic kidney disease) stage 3, GFR 30-59 ml/min    3. Arthritis    4. Chronic diastolic congestive heart failure    5. Sensorineural hearing loss (SNHL) of both ears    6. Heart failure and kidney disease due to high blood pressure        Plan:   Fracisco was seen today for follow-up.  Diagnoses and all orders for this visit:    Primary osteoarthritis of both knees  -     Ambulatory referral to Home Health    CKD (chronic kidney disease) stage 3, GFR 30-59 ml/min  -     Ambulatory referral to Home Health  -     Ambulatory referral to Cardiology    Arthritis  -     Ambulatory referral to Home Health    Chronic diastolic congestive heart failure  -     Ambulatory referral to Home Health    Sensorineural hearing loss (SNHL) of both ears  -     Ambulatory referral to Home Health    Heart failure and kidney disease due to high blood pressure  -     Ambulatory referral to Cardiology          Diagnoses and all orders for this visit:    Home health to address diet ,compliance, improved activity. Avoid falling.

## 2018-12-12 NOTE — PATIENT INSTRUCTIONS

## 2018-12-17 ENCOUNTER — LAB VISIT (OUTPATIENT)
Dept: LAB | Facility: HOSPITAL | Age: 83
End: 2018-12-17
Attending: FAMILY MEDICINE
Payer: MEDICARE

## 2018-12-17 ENCOUNTER — TELEPHONE (OUTPATIENT)
Dept: FAMILY MEDICINE | Facility: CLINIC | Age: 83
End: 2018-12-17

## 2018-12-17 ENCOUNTER — TELEPHONE (OUTPATIENT)
Dept: ADMINISTRATIVE | Facility: CLINIC | Age: 83
End: 2018-12-17

## 2018-12-17 DIAGNOSIS — I50.32 CHRONIC DIASTOLIC HEART FAILURE: Primary | ICD-10-CM

## 2018-12-17 PROCEDURE — 80048 BASIC METABOLIC PNL TOTAL CA: CPT

## 2018-12-17 NOTE — TELEPHONE ENCOUNTER
Home Health SOC 12/13/2018 - 02/11/2019 with [place] ([place]) - Dr. Marquis Brush. SN, PT, OT, MSW and HHA services.

## 2018-12-17 NOTE — TELEPHONE ENCOUNTER
Spoke to Roopa with [facility] who states on medication list at time of home health admit noted order for Bactrim. Upon further inspection it was noted order for Bactrim dated 4-20-18-20 tablets-take twice daily. No other order for Bactrim noted. Mrs. Blas notified. Verbalized understanding.

## 2018-12-17 NOTE — TELEPHONE ENCOUNTER
----- Message from Cleveland Gilliam sent at 12/17/2018  9:40 AM CST -----  Type: Needs Medical Advice    Who Called:  [facility] nurseRamon Blas   Symptoms (please be specific):  NA   How long has patient had these symptoms:  NA Pharmacy [phone]  Best Call Back Number: [phone]  Additional Information: The nurse notice the doctor order antibiotic bacterium, the patient never received the rx. The nurse states the pharmacy never received the rx, The nurse states she is still working with the patient to be compliance with medication.

## 2018-12-18 LAB
ANION GAP SERPL CALC-SCNC: 13 MMOL/L
BUN SERPL-MCNC: 13 MG/DL
CALCIUM SERPL-MCNC: 9.5 MG/DL
CHLORIDE SERPL-SCNC: 107 MMOL/L
CO2 SERPL-SCNC: 22 MMOL/L
CREAT SERPL-MCNC: 0.8 MG/DL
EST. GFR  (AFRICAN AMERICAN): >60 ML/MIN/1.73 M^2
EST. GFR  (NON AFRICAN AMERICAN): >60 ML/MIN/1.73 M^2
GLUCOSE SERPL-MCNC: 88 MG/DL
POTASSIUM SERPL-SCNC: 4.6 MMOL/L
SODIUM SERPL-SCNC: 142 MMOL/L

## 2019-01-14 ENCOUNTER — LAB VISIT (OUTPATIENT)
Dept: LAB | Facility: HOSPITAL | Age: 84
End: 2019-01-14
Attending: FAMILY MEDICINE
Payer: MEDICARE

## 2019-01-14 DIAGNOSIS — I13.10 MALIGNANT HYPERTENSIVE HEART AND RENAL DISEASE, WITH RENAL FAILURE: Primary | ICD-10-CM

## 2019-01-14 LAB
ANION GAP SERPL CALC-SCNC: 11 MMOL/L
BUN SERPL-MCNC: 15 MG/DL
CALCIUM SERPL-MCNC: 9.9 MG/DL
CHLORIDE SERPL-SCNC: 101 MMOL/L
CO2 SERPL-SCNC: 26 MMOL/L
CREAT SERPL-MCNC: 0.9 MG/DL
EST. GFR  (AFRICAN AMERICAN): >60 ML/MIN/1.73 M^2
EST. GFR  (NON AFRICAN AMERICAN): 56.9 ML/MIN/1.73 M^2
GLUCOSE SERPL-MCNC: 190 MG/DL
POTASSIUM SERPL-SCNC: 4.2 MMOL/L
SODIUM SERPL-SCNC: 138 MMOL/L

## 2019-01-14 PROCEDURE — 80048 BASIC METABOLIC PNL TOTAL CA: CPT

## 2019-01-22 RX ORDER — METOPROLOL SUCCINATE 25 MG/1
TABLET, EXTENDED RELEASE ORAL
Qty: 30 TABLET | Refills: 6 | Status: SHIPPED | OUTPATIENT
Start: 2019-01-22 | End: 2019-06-18 | Stop reason: SDUPTHER

## 2019-02-28 ENCOUNTER — TELEPHONE (OUTPATIENT)
Dept: FAMILY MEDICINE | Facility: CLINIC | Age: 84
End: 2019-02-28

## 2019-02-28 NOTE — TELEPHONE ENCOUNTER
----- Message from Deisi Molina sent at 2/28/2019 10:54 AM CST -----  Type: Needs Medical Advice    Who Called:  Sanjuanita Wilkinson - care taker  Symptoms (please be specific):  Both feet swollen  How long has patient had these symptoms:  1 week  Pharmacy name and phone #:    [id number] - [place], [place] - [address]  [place] [address]  Phone: [phone] Fax: [phone]  Best Call Back Number: [phone]  Additional Information:

## 2019-02-28 NOTE — TELEPHONE ENCOUNTER
Ms Sanjuanita Wilkinson, caretaker for Mrs Thomason called and stated she is having severe swelling in her feet.  Ms Thomason is unable to leave home to come in to office to be seen.  Ms. Wilkinson is requesting home health for Mrs Thomason.

## 2019-02-28 NOTE — TELEPHONE ENCOUNTER
----- Message from Deisi Molina sent at 2/28/2019 10:54 AM CST -----  Type: Needs Medical Advice    Who Called:  Sanjuanita Wilkinson - care taker  Symptoms (please be specific):  Both feet swollen  How long has patient had these symptoms:  1 week  Pharmacy name and phone #:    [id number] - [place], [place] - [address]  [place] [address]  Phone: [phone] Fax: [phone]  Best Call Back Number: [phone]  Additional Information:

## 2019-03-07 DIAGNOSIS — M79.89 LEG SWELLING: ICD-10-CM

## 2019-03-07 DIAGNOSIS — I73.9 PVD (PERIPHERAL VASCULAR DISEASE): Chronic | ICD-10-CM

## 2019-03-07 DIAGNOSIS — I10 ESSENTIAL HYPERTENSION: Chronic | ICD-10-CM

## 2019-03-07 RX ORDER — POTASSIUM CHLORIDE 1500 MG/1
TABLET, EXTENDED RELEASE ORAL
Qty: 15 TABLET | Refills: 3 | Status: SHIPPED | OUTPATIENT
Start: 2019-03-07 | End: 2019-08-30

## 2019-03-11 NOTE — TELEPHONE ENCOUNTER
Please refer to attached message. Message forwarded to Dr. Brush by Charlotte Portillo MA requesting home health orders for patient. Per Dr. Brush patient is currently under [facility]. Writer called to confirm home health is coming to patient's home. Advised by patient's caretaker ([place]) patient does not have home health services. Mrs. Gann states she called [facility] to request services and was advised to contact PCP and request orders. Please advise if home health orders can be placed for patient at this time.

## 2019-03-12 NOTE — TELEPHONE ENCOUNTER
To reorder HH, the patient will need a face to face appointment. Please make this appt extended ( 40- 60 min)

## 2019-03-20 ENCOUNTER — TELEPHONE (OUTPATIENT)
Dept: FAMILY MEDICINE | Facility: CLINIC | Age: 84
End: 2019-03-20

## 2019-03-20 NOTE — TELEPHONE ENCOUNTER
----- Message from Tod Short sent at 3/20/2019  3:45 PM CDT -----  Contact: pt  Gabriele  Pt is requesting orders to be put in    Orders Requested: home health  Reason for the orders: unable to care for self   Additional Information: pt is unable to leave home to be seen at office    Please call pt to inform them the orders have been entered so that they are able to call and schedule.     Call Back #: [phone]  Thanks

## 2019-03-20 NOTE — TELEPHONE ENCOUNTER
----- Message from Tod Short sent at 3/20/2019  3:45 PM CDT -----  Contact: pt  Gabriele  Pt is requesting orders to be put in    Orders Requested: home health  Reason for the orders: unable to care for self   Additional Information: pt is unable to leave home to be seen at office    Please call pt to inform them the orders have been entered so that they are able to call and schedule.     Call Back #: [phone]  Thanks

## 2019-04-18 ENCOUNTER — PATIENT OUTREACH (OUTPATIENT)
Dept: ADMINISTRATIVE | Facility: HOSPITAL | Age: 84
End: 2019-04-18

## 2019-05-15 ENCOUNTER — TELEPHONE (OUTPATIENT)
Dept: FAMILY MEDICINE | Facility: CLINIC | Age: 84
End: 2019-05-15

## 2019-05-15 NOTE — TELEPHONE ENCOUNTER
Spoke to Dr. Brush verbally regarding request for home health. Per Dr. Brush patient needs to be seen in office related to request. Patient has missed/canceled appointments in office recently. Call placed to patient's  (Gabriele) to assist with scheduling. Mr. Suazo stated it is impossible for patient to leave home. States he expects for orders for home health to be placed without office appointment. Mr. Suazo abruptly ended call. Message will be forwarded to Dr. Brush for notification.

## 2019-05-15 NOTE — TELEPHONE ENCOUNTER
----- Message from Velia Mcclure sent at 5/15/2019 12:00 PM CDT -----  Contact: Gabriele/  Patient is calling to get home health to come out for her, Please call him at [phone].    Thanks  Td

## 2019-05-23 ENCOUNTER — TELEPHONE (OUTPATIENT)
Dept: FAMILY MEDICINE | Facility: CLINIC | Age: 84
End: 2019-05-23

## 2019-05-23 DIAGNOSIS — N18.30 CKD (CHRONIC KIDNEY DISEASE) STAGE 3, GFR 30-59 ML/MIN: ICD-10-CM

## 2019-05-23 DIAGNOSIS — I10 ESSENTIAL HYPERTENSION: ICD-10-CM

## 2019-05-23 DIAGNOSIS — I50.32 CHRONIC DIASTOLIC CONGESTIVE HEART FAILURE: Primary | ICD-10-CM

## 2019-05-23 DIAGNOSIS — R60.0 BILATERAL LOWER EXTREMITY EDEMA: ICD-10-CM

## 2019-05-23 NOTE — TELEPHONE ENCOUNTER
Spoke to patient's  (Gabriele) who states he recently took over the responsibility of administering his wife her medication. Mr. Suazo states he does not know what medication need to be refilled at this time. States he doesn't know where patient's supply of medication is located presently, and wife is unsure as well. Mr. Suazo states patient is miserable in her current state and takes matters out on him. Mr. Suazo requests writer call pharmacy and request refill of medication that can be refilled at this time. Writer contacted pharmacy with Mr. Suazo request. Please be advised.

## 2019-05-23 NOTE — TELEPHONE ENCOUNTER
----- Message from Sagrario Oakley sent at 5/23/2019  9:56 AM CDT -----  Type: Needs Medical Advice    Who Called:   Gabriele Pires   Symptoms (please be specific):  Pt threw away her medications   How long has patient had these symptoms:  She will need refills   Pharmacy name and phone #:    [id number] - [place], [place] - [address]  [place] [address]  Phone: [phone] Fax: [phone]    Best Call Back Number: [phone] (home)   Additional Information:  Asking to speak to nurse

## 2019-05-29 ENCOUNTER — TELEPHONE (OUTPATIENT)
Dept: FAMILY MEDICINE | Facility: CLINIC | Age: 84
End: 2019-05-29

## 2019-05-29 NOTE — TELEPHONE ENCOUNTER
Please refer to attached message from patient's friend (Brigid). States patient discarded prescriptions stating they were out of date. Need refills of 2 of her medications but unsure of the name of the medication. Mrs. Rainey states she have the last dose of the medication. Advised Mrs. Rainey the pharmacy will be able to identify the tablet and give information on name and dosage. Mrs. Rainey verbalized understanding. States once she has the tablet identified she will call office to request refill.             ----- Message from Fiona Ayala sent at 5/29/2019  1:17 PM CDT -----  Contact: Pt friend   Pt friend (Brigid Portillo) is requesting a call back in regards to pt rx that was not called in. Pt friend stated she does not know the name of the rx that was not called and would need to speak with the nurse.       Pt friend (Brigid Portillo) can be contacted at  [phone]

## 2019-05-30 RX ORDER — LISINOPRIL 2.5 MG/1
TABLET ORAL
Qty: 90 TABLET | Refills: 1 | Status: SHIPPED | OUTPATIENT
Start: 2019-05-30 | End: 2019-08-30

## 2019-05-31 ENCOUNTER — TELEPHONE (OUTPATIENT)
Dept: ADMINISTRATIVE | Facility: CLINIC | Age: 84
End: 2019-05-31

## 2019-05-31 NOTE — PROGRESS NOTES
HH SOC with [facility] ,Dr. Marquis Brush. Patient received PT services.

## 2019-06-07 ENCOUNTER — EXTERNAL HOME HEALTH (OUTPATIENT)
Dept: HOME HEALTH SERVICES | Facility: HOSPITAL | Age: 84
End: 2019-06-07
Payer: MEDICARE

## 2019-06-07 PROCEDURE — G0180 PR HOME HEALTH MD CERTIFICATION: ICD-10-PCS | Mod: ,,, | Performed by: FAMILY MEDICINE

## 2019-06-07 PROCEDURE — G0180 MD CERTIFICATION HHA PATIENT: HCPCS | Mod: ,,, | Performed by: FAMILY MEDICINE

## 2019-06-10 DIAGNOSIS — R60.0 BILATERAL LOWER EXTREMITY EDEMA: ICD-10-CM

## 2019-06-10 DIAGNOSIS — M79.89 LEG SWELLING: ICD-10-CM

## 2019-06-10 RX ORDER — FUROSEMIDE 20 MG/1
TABLET ORAL
Qty: 30 TABLET | Refills: 1 | Status: SHIPPED | OUTPATIENT
Start: 2019-06-10 | End: 2019-08-30 | Stop reason: SDUPTHER

## 2019-06-10 RX ORDER — CLOPIDOGREL BISULFATE 75 MG/1
TABLET ORAL
Qty: 30 TABLET | Refills: 1 | Status: SHIPPED | OUTPATIENT
Start: 2019-06-10 | End: 2020-02-27

## 2019-06-18 RX ORDER — METOPROLOL SUCCINATE 25 MG/1
TABLET, EXTENDED RELEASE ORAL
Qty: 90 TABLET | Refills: 2 | Status: SHIPPED | OUTPATIENT
Start: 2019-06-18 | End: 2020-02-27

## 2019-06-24 ENCOUNTER — TELEPHONE (OUTPATIENT)
Dept: FAMILY MEDICINE | Facility: CLINIC | Age: 84
End: 2019-06-24

## 2019-06-24 NOTE — TELEPHONE ENCOUNTER
Patient's  (Gabriele) requesting refill of Clopidogrel. Upon further inspection it was noted this medication was e-scribed to pharmacy on 6/10/19 for 30 day supply with 1 additional refill. Call placed to pharmacy to confirm. Advised patient last filled prescription on 6/10/19, not due to be refilled again until 7/10/19. Mr. Suazo notified. Verbalized understanding.           ----- Message from Cherie Toure sent at 6/24/2019  9:43 AM CDT -----  Type:  RX Refill Request    Who Called: Gabriele Pires ( spouse )   Refill or New Rx:  Refill  RX Name and Strength:  clopidogrel (PLAVIX) 75 mg tablet  How is the patient currently taking it? (ex. 1XDay):  1xday  Is this a 30 day or 90 day RX: 30  Preferred Pharmacy with phone number:     [id number] - [place], [place] - [address] [address]  Phone: [phone] Fax: [phone]      Local or Mail Order:  local  Ordering Provider:  Dr Gonsalo Finney Call Back Number:  [phone]   Additional Information: Refill Request

## 2019-06-24 NOTE — TELEPHONE ENCOUNTER
----- Message from Mariluz Gaviria sent at 6/24/2019  9:42 AM CDT -----  [facility] states pt refused her monthly lab today,wanted to know if Dr wants them to try again next week,next month or cancel the labs...[phone]

## 2019-08-22 ENCOUNTER — TELEPHONE (OUTPATIENT)
Dept: FAMILY MEDICINE | Facility: CLINIC | Age: 84
End: 2019-08-22

## 2019-08-22 NOTE — TELEPHONE ENCOUNTER
Please put referral in for [employer] for patient.  She cannot leave home and is requesting home visits.

## 2019-08-22 NOTE — TELEPHONE ENCOUNTER
----- Message from Brigid Mix sent at 8/22/2019  3:00 PM CDT -----  Contact: spouse,Gabriele Pranay   Type: Needs Medical Advice    Who Called:  Gabriele Pires (Spouse)  Symptoms (please be specific):    How long has patient had these symptoms:    Pharmacy name and phone #:    Best Call Back Number: [phone] (home)   Additional Information: Patient would like to know if someone could see her at her home. Patient is not able to travel to the office. Please call spouse to discuss. Thanks!

## 2019-08-23 NOTE — TELEPHONE ENCOUNTER
Ms Pires,  Wheelchair-bound patient.  She has early vascular dementia.  She has a history of orthostatic edema, decreased ejection fraction, Chronic kidney disease 3, poor medical compliance.  She has emotional support but poor family dynamics.  Patient needs medical home health care and support.  High risks for complications and readmissions.

## 2019-08-26 NOTE — TELEPHONE ENCOUNTER
She would be great for our program!   If she is willing to see us, let us know and yehuda can schedule her. Thank you!!

## 2019-08-30 ENCOUNTER — OFFICE VISIT (OUTPATIENT)
Dept: FAMILY MEDICINE | Facility: CLINIC | Age: 84
End: 2019-08-30
Payer: MEDICARE

## 2019-08-30 ENCOUNTER — TELEPHONE (OUTPATIENT)
Dept: FAMILY MEDICINE | Facility: CLINIC | Age: 84
End: 2019-08-30

## 2019-08-30 VITALS — DIASTOLIC BLOOD PRESSURE: 74 MMHG | HEART RATE: 70 BPM | SYSTOLIC BLOOD PRESSURE: 140 MMHG

## 2019-08-30 DIAGNOSIS — N18.30 CKD (CHRONIC KIDNEY DISEASE) STAGE 3, GFR 30-59 ML/MIN: Chronic | ICD-10-CM

## 2019-08-30 DIAGNOSIS — G31.84 MILD COGNITIVE IMPAIRMENT: ICD-10-CM

## 2019-08-30 DIAGNOSIS — Z71.89 ADVANCED CARE PLANNING/COUNSELING DISCUSSION: ICD-10-CM

## 2019-08-30 DIAGNOSIS — R53.81 DEBILITY: ICD-10-CM

## 2019-08-30 DIAGNOSIS — I67.89 CEREBRAL MICROVASCULAR DISEASE: ICD-10-CM

## 2019-08-30 DIAGNOSIS — D75.89 MACROCYTOSIS: ICD-10-CM

## 2019-08-30 DIAGNOSIS — I20.9 ANGINA PECTORIS: ICD-10-CM

## 2019-08-30 DIAGNOSIS — I10 ESSENTIAL HYPERTENSION: Primary | Chronic | ICD-10-CM

## 2019-08-30 DIAGNOSIS — R60.0 BILATERAL LOWER EXTREMITY EDEMA: ICD-10-CM

## 2019-08-30 DIAGNOSIS — M79.89 LEG SWELLING: ICD-10-CM

## 2019-08-30 DIAGNOSIS — J84.9 ILD (INTERSTITIAL LUNG DISEASE): ICD-10-CM

## 2019-08-30 DIAGNOSIS — H04.203 EXCESSIVE TEAR PRODUCTION OF BOTH LACRIMAL GLANDS: ICD-10-CM

## 2019-08-30 DIAGNOSIS — E55.9 VITAMIN D DEFICIENCY: ICD-10-CM

## 2019-08-30 DIAGNOSIS — M17.0 PRIMARY OSTEOARTHRITIS OF BOTH KNEES: ICD-10-CM

## 2019-08-30 DIAGNOSIS — I50.32 CHRONIC DIASTOLIC CONGESTIVE HEART FAILURE: Chronic | ICD-10-CM

## 2019-08-30 DIAGNOSIS — D69.2 SENILE PURPURA: ICD-10-CM

## 2019-08-30 DIAGNOSIS — N18.30 CHRONIC KIDNEY DISEASE, STAGE 3: ICD-10-CM

## 2019-08-30 PROCEDURE — 99999 PR PBB SHADOW E&M-EST. PATIENT-LVL III: ICD-10-PCS | Mod: PBBFAC,,, | Performed by: INTERNAL MEDICINE

## 2019-08-30 PROCEDURE — 99350 HOME/RES VST EST HIGH MDM 60: CPT | Mod: ,,, | Performed by: INTERNAL MEDICINE

## 2019-08-30 PROCEDURE — 99350 PR HOME VISIT,ESTAB PATIENT,LEVEL IV: ICD-10-PCS | Mod: ,,, | Performed by: INTERNAL MEDICINE

## 2019-08-30 PROCEDURE — 99999 PR PBB SHADOW E&M-EST. PATIENT-LVL III: CPT | Mod: PBBFAC,,, | Performed by: INTERNAL MEDICINE

## 2019-08-30 RX ORDER — FUROSEMIDE 20 MG/1
TABLET ORAL
Qty: 30 TABLET | Refills: 1 | Status: SHIPPED | OUTPATIENT
Start: 2019-08-30 | End: 2020-02-27 | Stop reason: SDUPTHER

## 2019-08-30 NOTE — ASSESSMENT & PLAN NOTE
Homebound due to arthritis of the knees limiting mobility  Referral to home health   Discussed taking lasix to reduce LE swelling that could help improve mobility.

## 2019-08-30 NOTE — ASSESSMENT & PLAN NOTE
Failed minicog   Reviewed CT Head which shows moderate periventicular white matter disease   Will complete slums at next visit, suspect at least MCI

## 2019-08-30 NOTE — ASSESSMENT & PLAN NOTE
Suspect multifactorial etiology  Pt refuse to wear compression stocking   Pt admit to not going to follow low sodium diet   Pt would benefit from taking lasix but she admits that she will not lasix daily   Home health referral to help with weight monitoring

## 2019-08-30 NOTE — ASSESSMENT & PLAN NOTE
Concerned about lesion on nose   pt cannot leave the home due to debility and knees to be evaluated by dermatology.    monitor

## 2019-08-30 NOTE — ASSESSMENT & PLAN NOTE
Instructed to try visine natural tears   If still persists - try loratadine and flonase for allergies

## 2019-08-30 NOTE — ASSESSMENT & PLAN NOTE
Completed advanced care directive paperwork with patient   Listed dtr and  as health care proxy   Pt was able to state that she does not want to be intubated or be in hospital if she developed an irreverisble condition or if she developed an endstage condition  Discuss code status at next viist

## 2019-08-30 NOTE — ASSESSMENT & PLAN NOTE
bp acceptable for her age   Advised to watch salt but pt admitted she is not going to do this   Is not taking medications daily - pt admits that she is not going to take daily medications.   Will set up with home health to take blood pressures

## 2019-08-30 NOTE — ASSESSMENT & PLAN NOTE
Noted on ECHO  NYHA Class B , Class C   On Bb and lasix and statin but not complaint   Denies SOB, ++  peripheral swelling noted; clinically asymptomatic.   Encourage low salt diet/Mediterranean diet and routine exercise.   Home health to check weights   Encourage pt to take lasix but she will not do this.

## 2019-08-30 NOTE — ASSESSMENT & PLAN NOTE
Chronic pain, pt not taking any medications  Needs aide to assist with adls  Ordered HH for PT to help improve mobility if possible

## 2019-08-30 NOTE — ASSESSMENT & PLAN NOTE
Noted on imaging   Pt reports no issues with sob with just rest but does have sob on exertion  Lungs clear on exam  Pt has not used inhalers, feels she does not need them   Monitor

## 2019-08-30 NOTE — PROGRESS NOTES
"Primary Care Provider Appointment- HOME VISIT    Subjective:      Patient ID: Fracisco Pires is a 89 y.o. female.  with hx of osteoarthritis of both knees s/p TKR, debility, homebound status, obesity, HTN, HLD, NSTEMI, hx of mutliple hospitalizations for sepsis due to UTI and cellulitis, chronic diastolic dysufnction, interstital fibrosis      Chief Complaint: Establish Care and Knee Pain    New pt to me, visit completed by myself and KENDAL Smith.   Seen in her home due to pt inability to come to clinic, can not get into her 's van.   Here  Gabriele was also at home,  Was not interested in participating in visit - " I have trouble keeping up with my health" he reported but was appreciative that we came to see his wife.       Pt reports overall feeling well - " I am better than most people younger than me"  Last hospitalization 2018, has not been to ER since. Of note she had 3 hospitalizations in 2018  In March, April and November due to sepsis either from UTI or cellulitis. Patient did not recall the nature of why she was hospitalized.     She does not agree with several of her listed conditions - reports never has had asthma but  reports sob with exertion but is mostly sedentary denies any sob while sitting. Never has used inhalers even though prescribed. Denies ever having heart failure exacerbation. Is not aware of having heart attack.     Her issues are chronic watery eyes and wants to know what she can do for constipation. Admits to some memory loss but cannot give examples.     Admits to not taking medications daily because she does not feel like she needs meds nor does she know why she is taking medications. Does not use a pill box.   KENDAL Smith and I had to search for her medications - her  did not know where her medications where either.   She only had plavix and metoprolol filled in June and July respectively and had almost 1/2 of bottle left of plavix which was a 60 day supply and " she had almost half bottle of metoprolol left too.   Does not check blood pressure at all nor she does check her weight.     Says she is in chronic pain but she deals with pain, does not take anything for pain because she feels like it is not that severe for her.  Has difficulty walking due to pain and weakness  Has significant swelling in both legs but is not painful for patient, she reports this is now normal for her. Of note, her dogs were constantly licking her feet and legs.     Has home health that comes weekly but does not know what they come for. KENDAL Smith called comfort care - an aide comes weekly for 4 hours but they report Mrs. Syed refuses her bath. Mrs. Syed denies this and states that she needs someone to help her because she cannot reach all of the places that she needs to. She takes a sponge bath, cannot get into tub. She uses a rollating walker to get around the house.     Denies any recent falls and cannot remember her last fall.     She does not cook, her  prepares her breakfast, sometimes she will eat lunch, will eat food that her friends bring her. Does not watch her diet, admit that she will eat anything she wants. 2-3 glasses of white wine daily   Does not eat meat, bread jam butter for breakfast, fruits for snack, friends will bring over dinner sometimes. Usually eats 2 meals daily.            Past Surgical History:   Procedure Laterality Date    CATARACT EXTRACTION      os d 11/6/13// od d 4/16/14//    EXCISION-LESION Right 10/13/2016    [facility]    EXTRACTION, CATARACT, WITH IOL INSERTION Right 4/16/2014    [facility]    EXTRACTION, CATARACT, WITH IOL INSERTION Left 11/6/2013    [facility]    EYE SURGERY      both eyes    JOINT REPLACEMENT      bilateral knee replacement       Past Medical History:   Diagnosis Date    Arthritis     Asthma     recent  bronchits treated and resoved with Levaquin. Last Nov 2011.    Cataract     ou done//    Diabetes mellitus     diet controlled    GERD (gastroesophageal reflux disease)     Hypertension     Phlebitis and thrombophlebitis of the leg 2005 before    Skin lesion of hand        Review of Systems   Constitutional: Negative for unexpected weight change.   HENT: Negative for congestion, ear discharge and sinus pressure.    Eyes: Positive for discharge. Negative for visual disturbance.   Respiratory: Positive for shortness of breath. Negative for wheezing.    Cardiovascular: Positive for leg swelling. Negative for chest pain and palpitations.   Gastrointestinal: Positive for constipation. Negative for abdominal pain, blood in stool and diarrhea.   Genitourinary: Positive for difficulty urinating. Negative for pelvic pain.   Musculoskeletal: Positive for arthralgias and gait problem.   Skin: Negative for rash.   Allergic/Immunologic: Negative for immunocompromised state.   Neurological: Positive for weakness. Negative for dizziness.   Hematological: Bruises/bleeds easily.   Psychiatric/Behavioral: Negative for confusion and suicidal ideas. The patient is not nervous/anxious.         Memory loss        Objective:       Physical Exam   Constitutional: She is oriented to person, place, and time. She appears well-developed and well-nourished.   Obese   pleasant   HENT:   Head: Normocephalic and atraumatic.   Eyes: Pupils are equal, round, and reactive to light. EOM are normal.   Normal conjunctiva, normal eyelashes  Increased lacrimination of eyes    Neck: Normal range of motion. Neck supple.   Cardiovascular: Normal rate, regular rhythm and normal heart sounds.   Could not feel distal pulses due to LE swelling   2-3+ bilateral pitting  LE swelling from feet up to calf    Pulmonary/Chest: Effort normal and breath sounds normal.   Abdominal: Soft. Bowel sounds are normal. There is no tenderness.   Musculoskeletal:    Bilateral scars on knees   4/5 strength in legs   Using rollating walker      Neurological: She is alert and oriented to person, place, and time. No cranial nerve deficit.   Skin: Skin is warm and dry.   Multiple bruising on arms   Crusted lesion on nose with some surrounding erythema     Psychiatric: She has a normal mood and affect. Her behavior is normal.   Short term memory loss   Mini cog:   Remembered 3 item recall only with cues   Failed clock draw      Nose Lesion         Lab Results   Component Value Date    WBC 4.94 10/08/2018    HGB 14.1 10/08/2018    HCT 45.9 10/08/2018     10/08/2018    CHOL 140 11/27/2014    TRIG 48 11/27/2014    HDL 55 11/27/2014    ALT 11 07/19/2017    AST 24 07/19/2017     01/14/2019    K 4.2 01/14/2019     01/14/2019    CREATININE 0.9 01/14/2019    BUN 15 01/14/2019    CO2 26 01/14/2019    TSH 0.467 11/27/2014    HGBA1C 6.1 01/20/2016     Clock Draw Test            Assessment:   89 y.o. female with multiple co-morbid illnesses here to continue work-up of chronic issues notably with hx of osteoarthritis of both knees s/p TKR, debility, homebound status, obesity, HTN, HLD, NSTEMI, hx of mutliple hospitalizations for sepsis due to UTI and cellulitis, chronic diastolic dysufnction, interstital fibrosis      Plan:     Problem List Items Addressed This Visit        Neuro    Mild cognitive impairment     Failed minicog   Reviewed CT Head which shows moderate periventicular white matter disease   Will complete slums at next visit, suspect at least MCI            Cerebral microvascular disease     Seen on CT head imaging   Pt not complaint with statin or plavix               Ophtho    Excessive tear production of both lacrimal glands     Instructed to try visine natural tears   If still persists - try loratadine and flonase for allergies             Pulmonary    ILD (interstitial lung disease)     Noted on imaging   Pt reports no issues with sob with just rest but does  have sob on exertion  Lungs clear on exam  Pt has not used inhalers, feels she does not need them   Monitor             Cardiac/Vascular    Hypertension - Primary (Chronic)     bp acceptable for her age   Advised to watch salt but pt admitted she is not going to do this   Is not taking medications daily - pt admits that she is not going to take daily medications.   Will set up with home health to take blood pressures          Relevant Orders    PTH, intact    Ambulatory referral to Home Health    Chronic diastolic congestive heart failure (Chronic)     Noted on ECHO  NYHA Class B , Class C   On Bb and lasix and statin but not complaint   Denies SOB, ++  peripheral swelling noted; clinically asymptomatic.   Encourage low salt diet/Mediterranean diet and routine exercise.   Home health to check weights   Encourage pt to take lasix but she will not do this.                       Renal/    CKD (chronic kidney disease) stage 3, GFR 30-59 ml/min (Chronic)     Check labs before next visit   Obtain vitmain D, pth             Hematology    Senile purpura     Noted on exam  Monitor with plavix            Oncology    Squamous cell carcinoma     Concerned about lesion on nose   pt cannot leave the home due to debility and knees to be evaluated by dermatology.    monitor             Orthopedic    Primary osteoarthritis of both knees     Chronic pain, pt not taking any medications  Needs aide to assist with adls  Ordered HH for PT to help improve mobility if possible             Palliative Care    Advanced care planning/counseling discussion     Completed advanced care directive paperwork with patient   Listed dtr and  as health care proxy   Pt was able to state that she does not want to be intubated or be in hospital if she developed an irreverisble condition or if she developed an endstage condition  Discuss code status at next viist             Other    Bilateral lower extremity edema     Suspect multifactorial  etiology  Pt refuse to wear compression stocking   Pt admit to not going to follow low sodium diet   Pt would benefit from taking lasix but she admits that she will not lasix daily   Home health referral to help with weight monitoring          Relevant Medications    furosemide (LASIX) 20 MG tablet    Other Relevant Orders    Ambulatory referral to Home Health    Debility     Homebound due to arthritis of the knees limiting mobility  Referral to home health   Discussed taking lasix to reduce LE swelling that could help improve mobility.          Relevant Orders    Ambulatory referral to Home Health      Other Visit Diagnoses     Leg swelling        Relevant Medications    furosemide (LASIX) 20 MG tablet    Other Relevant Orders    Ambulatory referral to Home Health    Chronic kidney disease, stage 3        Relevant Orders    Comprehensive metabolic panel    Macrocytosis        Relevant Orders    CBC auto differential    Vitamin B12    Ambulatory referral to Home Health    Angina pectoris        Relevant Orders    Lipid panel    Vitamin D deficiency        Relevant Orders    Vitamin D          Health Maintenance       Date Due Completion Date    TETANUS VACCINE 03/08/1947 --- pt to consider     Shingles Vaccine (1 of 2) 03/08/1979 --- pt to consider     Influenza Vaccine (1) 09/01/2019 11/3/2017    Lipid Panel 11/27/2019 11/27/2014    Aspirin/Antiplatelet Therapy 06/10/2020 6/10/2019        Obtain labs before next visit.   Bring copy of advanced care paperwork to next visit     Follow up in about 6 weeks (around 10/11/2019) for follow up on vaccination discussion, code status,  follow up on LE swelling and mobility . . One hour spent with this patient today, half of that in counseling.    Rachel Millard MD  Internal Medicine- Geriatrics  [facility] [place]

## 2019-08-30 NOTE — TELEPHONE ENCOUNTER
----- Message from Deisi Molina sent at 8/30/2019  9:43 AM CDT -----  Type: Needs Medical Advice    Who Called:  Dario Kelsey,   Best Call Back Number: [phone]  Additional Information: called to inform patient is running about 10 minutes late for nurse visit appointment today, Unable to reach pod

## 2019-09-06 ENCOUNTER — TELEPHONE (OUTPATIENT)
Dept: FAMILY MEDICINE | Facility: CLINIC | Age: 84
End: 2019-09-06

## 2019-09-06 NOTE — TELEPHONE ENCOUNTER
----- Message from Rene Rivera sent at 9/5/2019  4:09 PM CDT -----  Contact: Ophelia/[facility]  Ophelia called in regarding the attached patient.  Ophelia stated she received order for patient to be admitted for home health but patient does not meet requirements for home health.  Ophelia stated she recommends palliative care referral.  Ophelia's call back number is [phone]

## 2019-09-06 NOTE — TELEPHONE ENCOUNTER
Spoke with memo -  Home health has tried with PT but pt refused to participate, tried pill boxes but pt refused to take meds and no family is available to help reinforce recommendations so pt deemed not appropriate. I agree that palliative care is next step uyen since pt goals are in line with comfort care that way there can be an easy transition to hospice when appropriate and so pt can have closer checkin with home palliative team. Will discuss palliative care referral at my next visit with pt next month.     Rachel Millard MD  Internal Medicine- Geriatrics  [facility] [place]

## 2019-09-30 ENCOUNTER — TELEPHONE (OUTPATIENT)
Dept: FAMILY MEDICINE | Facility: CLINIC | Age: 84
End: 2019-09-30

## 2019-09-30 DIAGNOSIS — M17.0 PRIMARY OSTEOARTHRITIS OF BOTH KNEES: Primary | ICD-10-CM

## 2019-09-30 DIAGNOSIS — G31.84 MILD COGNITIVE IMPAIRMENT: ICD-10-CM

## 2019-09-30 DIAGNOSIS — I50.32 CHRONIC DIASTOLIC CONGESTIVE HEART FAILURE: Chronic | ICD-10-CM

## 2019-09-30 NOTE — TELEPHONE ENCOUNTER
----- Message from Sagrario Adin sent at 9/30/2019 12:34 PM CDT -----  Type:  RX Refill Request    Who Called: Gabriele Pires (Spouse)  Refill or New Rx:  New rx   RX Name and Strength:  Request a prescription for a flu shot   How is the patient currently taking it? (ex. 1XDay):     Is this a 30 day or 90 day RX:     Preferred Pharmacy with phone number:    [id number] - [place], [place] - [address]  [place] [address]  Phone: [phone] Fax: [phone]    Local or Mail Order:     Ordering Provider:  Dr Brush   [facility] Call Back Number:  [phone] (home)     Additional Information:  States she cannot leave the house / request to  the flu shot for nurse to give it to her

## 2019-09-30 NOTE — PROGRESS NOTES
requesting flu shot - sending request to [facility] to administer flu shot.     Rachel Millard MD  Internal Medicine- Geriatrics  [facility] [place]

## 2019-10-10 ENCOUNTER — OFFICE VISIT (OUTPATIENT)
Dept: FAMILY MEDICINE | Facility: CLINIC | Age: 84
End: 2019-10-10
Payer: MEDICARE

## 2019-10-10 DIAGNOSIS — I67.89 CEREBRAL MICROVASCULAR DISEASE: ICD-10-CM

## 2019-10-10 DIAGNOSIS — N18.30 CKD (CHRONIC KIDNEY DISEASE) STAGE 3, GFR 30-59 ML/MIN: Chronic | ICD-10-CM

## 2019-10-10 DIAGNOSIS — Z71.89 ADVANCED CARE PLANNING/COUNSELING DISCUSSION: ICD-10-CM

## 2019-10-10 DIAGNOSIS — G31.84 MILD COGNITIVE IMPAIRMENT: Primary | ICD-10-CM

## 2019-10-10 DIAGNOSIS — R53.81 DEBILITY: ICD-10-CM

## 2019-10-10 DIAGNOSIS — I10 ESSENTIAL HYPERTENSION: Chronic | ICD-10-CM

## 2019-10-10 DIAGNOSIS — I50.32 CHRONIC DIASTOLIC CONGESTIVE HEART FAILURE: Chronic | ICD-10-CM

## 2019-10-10 PROCEDURE — 90662 IIV NO PRSV INCREASED AG IM: CPT | Mod: S$GLB,,, | Performed by: INTERNAL MEDICINE

## 2019-10-10 PROCEDURE — G0008 FLU VACCINE - HIGH DOSE (65+) PRESERVATIVE FREE IM: ICD-10-PCS | Mod: S$GLB,,, | Performed by: INTERNAL MEDICINE

## 2019-10-10 PROCEDURE — 90662 FLU VACCINE - HIGH DOSE (65+) PRESERVATIVE FREE IM: ICD-10-PCS | Mod: S$GLB,,, | Performed by: INTERNAL MEDICINE

## 2019-10-10 PROCEDURE — 99350 HOME/RES VST EST HIGH MDM 60: CPT | Mod: ,,, | Performed by: INTERNAL MEDICINE

## 2019-10-10 PROCEDURE — 99350 PR HOME VISIT,ESTAB PATIENT,LEVEL IV: ICD-10-PCS | Mod: ,,, | Performed by: INTERNAL MEDICINE

## 2019-10-10 PROCEDURE — 99999 PR PBB SHADOW E&M-EST. PATIENT-LVL II: ICD-10-PCS | Mod: PBBFAC,,, | Performed by: INTERNAL MEDICINE

## 2019-10-10 PROCEDURE — G0008 ADMIN INFLUENZA VIRUS VAC: HCPCS | Mod: S$GLB,,, | Performed by: INTERNAL MEDICINE

## 2019-10-10 PROCEDURE — 99999 PR PBB SHADOW E&M-EST. PATIENT-LVL II: CPT | Mod: PBBFAC,,, | Performed by: INTERNAL MEDICINE

## 2019-10-15 NOTE — PROGRESS NOTES
"Primary Care Provider Appointment    Subjective:      Patient ID: Irdenzel Pires is a 89 y.o. female osteoarthritis of both knees s/p TKR, debility, homebound status, obesity, HTN, HLD, NSTEMI, [facility] for [facility] due to UTI and cellulitis, chronic diastolic dysufnction, interstital fibrosis    Chief Complaint: Leg Swelling    Seen in home today   Her  also is present   Pt reports she is doing well     Constipation: taking metamucil and prune juice     She only had 2 medications - plavix and metoprolol.   She was not aware of where her medications were.    is hesistant to say that he will help fill her pill box, he "have to take care of myself, Im on a lot of medications myself"   Pt does not weigh herself       Past Surgical History:   Procedure Laterality Date    CATARACT EXTRACTION      os d 13// od d 14//    EYE SURGERY      both eyes    JOINT REPLACEMENT      bilateral knee replacement       Past Medical History:   Diagnosis Date    Arthritis     Asthma     recent bronchits treated and resoved with Levaquin. Last 2011.    Cataract     ou done//    Diabetes mellitus     diet controlled    GERD (gastroesophageal reflux disease)     Hypertension     Phlebitis and thrombophlebitis of the leg  before    Skin lesion of hand        Social History     Socioeconomic History    Marital status:      Spouse name: Gabriele     Number of children: 1    Years of education: Not on file    Highest education level: Not on file   Occupational History     Employer: retired   Social Needs    Financial resource strain: Somewhat hard    Food insecurity:     Worry: Never true     Inability: Never true    Transportation needs:     Medical: No     Non-medical: No   Tobacco Use    Smoking status: Former Smoker     Packs/day: 1.50     Years: 50.00     Pack years: 75.00     Types: Cigarettes     Last attempt to quit: 1990     Years since quittin.8 "   Substance and Sexual Activity    Alcohol use: Yes     Alcohol/week: 14.0 standard drinks     Types: 14 Glasses of wine per week     Frequency: 4 or more times a week     Drinks per session: 3 or 4     Binge frequency: Daily or almost daily     Comment: 1-3 glasses of wine daily    Drug use: No    Sexual activity: Not Currently   Lifestyle    Physical activity:     Days per week: 0 days     Minutes per session: 0 min    Stress: Not at all   Relationships    Social connections:     Talks on phone: More than three times a week     Gets together: Once a week     Attends Catholic service: Never     Active member of club or organization: No     Attends meetings of clubs or organizations: Never     Relationship status:    Other Topics Concern    Not on file   Social History Narrative    Retired     Originally from [place], moved to [place] in the 1970s.      for over 60 years     Zoroastrian - Hindu members visit her weekly on Friday to give communion    Has 1 dtr Maris who lives 2 hours away     Worked in retail sales, owned pool supply store    Has 2 dogs         Review of Systems   All other systems reviewed and are negative.      Objective:   LMP 12/08/1978     Physical Exam   Constitutional: She is oriented to person, place, and time. She appears well-developed and well-nourished.   Cardiovascular: Normal rate, regular rhythm and normal heart sounds.   2+ LE edema in both feet bilaterally up to ankles    Pulmonary/Chest: Effort normal and breath sounds normal.   Neurological: She is alert and oriented to person, place, and time.   Skin:   No blisters on legs    Psychiatric: She has a normal mood and affect. Her behavior is normal.       Lab Results   Component Value Date    WBC 4.94 10/08/2018    HGB 14.1 10/08/2018    HCT 45.9 10/08/2018     10/08/2018    CHOL 140 11/27/2014    TRIG 48 11/27/2014    HDL 55 11/27/2014    ALT 11 07/19/2017    AST 24 07/19/2017     01/14/2019    K  4.2 01/14/2019     01/14/2019    CREATININE 0.9 01/14/2019    BUN 15 01/14/2019    CO2 26 01/14/2019    TSH 0.467 11/27/2014    HGBA1C 6.1 01/20/2016       RESULTS: Reviewed labs and images today            Assessment:   89 y.o. female with multiple co-morbid illnesses here to continue work-up of chronic issues notably osteoarthritis of both knees s/p TKR, debility, homebound status, obesity, HTN, HLD, NSTEMI, hx of [facility] hospitalizations for sepsis due to UTI and cellulitis, chronic diastolic dysufnction, interstital fibrosis      Plan:     Problem List Items Addressed This Visit        Neuro    Mild cognitive impairment - Primary     Pt scored 16/30 on slums   She does not have interest in taking medications which is the issue with compliance  Due to functional limitations, pt has to rely on  for most iadls but she can do her adls herself.   Cont to monitor              Relevant Orders    CBC auto differential    Comprehensive metabolic panel    Ambulatory referral to Home Health    SUBSEQUENT HOME HEALTH ORDERS    Cerebral microvascular disease    Relevant Orders    CBC auto differential    Comprehensive metabolic panel    Ambulatory referral to Home Health    SUBSEQUENT HOME HEALTH ORDERS       Cardiac/Vascular    Hypertension (Chronic)    Relevant Orders    CBC auto differential    Comprehensive metabolic panel    Ambulatory referral to Home Health    SUBSEQUENT HOME HEALTH ORDERS    Chronic diastolic congestive heart failure (Chronic)     Lungs clear on exam  Pt does not follow diet or medications compliance  Filled pill box with medicaitons available, instructed  to  lasix  Referred to [facility] for monthly home visit check on patient          Relevant Orders    CBC auto differential    Comprehensive metabolic panel    Ambulatory referral to Home Health    SUBSEQUENT HOME HEALTH ORDERS       Renal/    CKD (chronic kidney disease) stage 3, GFR 30-59 ml/min (Chronic)    Relevant Orders     CBC auto differential    Comprehensive metabolic panel    Ambulatory referral to Home Health    SUBSEQUENT HOME HEALTH ORDERS    Vitamin D    PTH, intact       Palliative Care    Advanced care planning/counseling discussion     Given paperwork for pt to discuss with her  and children               Other    Debility     Continue to advise pt to keep physically active as much as she can   Discussed reduction of leg swelling will make it easier for her to walk                  Health Maintenance       Date Due Completion Date    TETANUS VACCINE 03/08/1947 ---    Shingles Vaccine (1 of 2) 03/08/1979 ---    Influenza Vaccine (1) 09/01/2019 11/3/2017    Lipid Panel 11/27/2019 11/27/2014    Aspirin/Antiplatelet Therapy 06/10/2020 6/10/2019      Given flu shot   Will have [facility] obtain labs at visit   Pt has used all of home health resources.     Follow up in 2 months. Total face-to-face time was 60 min, 50% of this was spent on counseling and coordination of care. The following issues were discussed: osteoarthritis of both knees s/p TKR, debility, homebound status, obesity, HTN, HLD, NSTEMI, hx of mulitple hospitalizations for sepsis due to UTI and cellulitis, chronic diastolic dysufnction, interstital fibrosis.      Rachel Millard MD  Internal Medicine- Geriatrics  [facility] [place]

## 2019-10-17 NOTE — ASSESSMENT & PLAN NOTE
Continue to advise pt to keep physically active as much as she can   Discussed reduction of leg swelling will make it easier for her to walk

## 2019-10-17 NOTE — ASSESSMENT & PLAN NOTE
Lungs clear on exam  Pt does not follow diet or medications compliance  Filled pill box with medicaitons available, instructed  to  lasix  Referred to [facility] for monthly home visit check on patient

## 2019-10-17 NOTE — ASSESSMENT & PLAN NOTE
Pt scored 16/30 on slums   She does not have interest in taking medications which is the issue with compliance  Due to functional limitations, pt has to rely on  for most iadls but she can do her adls herself.   Cont to monitor

## 2019-10-21 ENCOUNTER — CARE AT HOME (OUTPATIENT)
Dept: HOME HEALTH SERVICES | Facility: CLINIC | Age: 84
End: 2019-10-21
Payer: MEDICARE

## 2019-10-21 VITALS
DIASTOLIC BLOOD PRESSURE: 78 MMHG | HEART RATE: 87 BPM | TEMPERATURE: 98 F | SYSTOLIC BLOOD PRESSURE: 128 MMHG | RESPIRATION RATE: 16 BRPM | OXYGEN SATURATION: 96 %

## 2019-10-21 DIAGNOSIS — R53.81 DEBILITY: ICD-10-CM

## 2019-10-21 DIAGNOSIS — N18.30 CKD (CHRONIC KIDNEY DISEASE) STAGE 3, GFR 30-59 ML/MIN: ICD-10-CM

## 2019-10-21 DIAGNOSIS — G31.84 MILD COGNITIVE IMPAIRMENT: ICD-10-CM

## 2019-10-21 DIAGNOSIS — R60.0 BILATERAL LOWER EXTREMITY EDEMA: ICD-10-CM

## 2019-10-21 DIAGNOSIS — M17.0 PRIMARY OSTEOARTHRITIS OF BOTH KNEES: Primary | ICD-10-CM

## 2019-10-21 DIAGNOSIS — I50.32 CHRONIC DIASTOLIC CONGESTIVE HEART FAILURE: ICD-10-CM

## 2019-10-21 PROCEDURE — 99349 PR HOME VISIT,ESTAB PATIENT,LEVEL III: ICD-10-PCS | Mod: S$GLB,,, | Performed by: NURSE PRACTITIONER

## 2019-10-21 PROCEDURE — 99349 HOME/RES VST EST MOD MDM 40: CPT | Mod: S$GLB,,, | Performed by: NURSE PRACTITIONER

## 2019-10-21 PROCEDURE — 1101F PT FALLS ASSESS-DOCD LE1/YR: CPT | Mod: CPTII,S$GLB,, | Performed by: NURSE PRACTITIONER

## 2019-10-21 PROCEDURE — 1101F PR PT FALLS ASSESS DOC 0-1 FALLS W/OUT INJ PAST YR: ICD-10-PCS | Mod: CPTII,S$GLB,, | Performed by: NURSE PRACTITIONER

## 2019-10-21 NOTE — PROGRESS NOTES
"[facility]  Medical Home Visit    Visit Date: 10/21/2019  Encounter Provider: Isabel Quintero NP  PCP:  Rachel Millard MD    Subjective:      Patient ID: Fracisco Pires is a 89 y.o. female.    Consult Requested By:  Dr. Rachel Millard  Reason for Consult:  Homebound status, lab draw, medication compliance    Fracisco is being seen at home due to homebound status.    Chief Complaint: Follow-up    Patient is homebound due to bilateral knee osteoarthritis with unsteady gait and has not had any lab work done in over a year. She was seen by Dr. Millard in the home 10/10/19 and a medication pill box was filled for the patient. Upon arrival today it is noted that the pill box is only empty for 2 days and the rest is filled with 2 pills each, as Dr. Millard had filled for her. Patient report "sometimes I just don't remember to take my medicine". Patient's  lives in the home with her but reports "I have to keep up with my own medicines". I attempted to draw labs on patient three times but was unsuccessful. Patient stated that she hasn't drank much fluids over the 24 hours.     Review of Systems   Constitutional: Negative.    HENT: Positive for hearing loss. Negative for trouble swallowing.    Eyes: Negative.    Respiratory: Positive for shortness of breath (with exertion) and wheezing (mild and with exertion). Negative for cough.    Gastrointestinal: Negative.    Endocrine: Negative.    Genitourinary: Negative.    Musculoskeletal: Positive for gait problem (unsteady).   Skin: Negative.    Allergic/Immunologic: Negative.    Hematological: Negative.    Psychiatric/Behavioral: Positive for decreased concentration (at times). Negative for agitation. The patient is not nervous/anxious.        Assessments:  · Environmental: safe, slightly cluttered, 2 medium sized dogs roaming the home, protective of patient  · Functional Status: mostly independent, requires assistance with medication planning  · Safety: none " identified  · Nutritional: adequate  · Home Health/DME/Supplies: rollator    Objective:   Physical Exam   Constitutional: She is oriented to person, place, and time. She appears well-developed and well-nourished.   HENT:   Head: Normocephalic and atraumatic.   Eyes: Pupils are equal, round, and reactive to light.   Neck: Normal range of motion. Neck supple.   Cardiovascular: Normal rate, regular rhythm, normal heart sounds and intact distal pulses.   No murmur heard.  Pulmonary/Chest: Effort normal. She has wheezes (mild and with exertion).   Abdominal: Soft. Bowel sounds are normal.   Musculoskeletal: She exhibits edema (2+ pitting edema to tops of both feet).   Neurological: She is alert and oriented to person, place, and time.   Skin: Skin is warm and dry. Capillary refill takes 2 to 3 seconds. No erythema.   Psychiatric: She has a normal mood and affect. Her behavior is normal.   Forgetful at times       Vitals:    10/21/19 0936   BP: 128/78   Pulse: 87   Resp: 16   Temp: 97.6 °F (36.4 °C)   TempSrc: Temporal   SpO2: 96%   PainSc: 0-No pain     There is no height or weight on file to calculate BMI.    Assessment:     1. Primary osteoarthritis of both knees    2. CKD (chronic kidney disease) stage 3, GFR 30-59 ml/min    3. Chronic diastolic congestive heart failure    4. Mild cognitive impairment    5. Bilateral lower extremity edema    6. Debility        Plan:     Ethical / Legal: Advance Care Planning   · Surrogate decision maker:  Name Maris Pires, Relationship: daughter  · Code Status:  DNR  · [place]:  On file  · Other advance directive:  none, Capacity to make medical decisions:  intact, Conflict non identified       Fracisco was seen today for follow-up.    Diagnoses and all orders for this visit:    Primary osteoarthritis of both knees  Active and causing homebound status and limited mobility.  Fall safety and precautions discussed. Uses rollator for ambulation assistance.    CKD (chronic kidney disease)  stage 3, GFR 30-59 ml/min  Unable to obtain labs today to review current status.  Notified PCP.     Chronic diastolic congestive heart failure  Stable. Encouraged low sodium diet. Patient states that she does not have an interest in weighing herself daily.    Mild cognitive impairment  Active. AAO x 3 today with some forgetfulness. Self reports forgetting to take medications.  Pill box indicates not taking medications as prescribed.     Bilateral lower extremity edema  Active in both feet today. Encouraged low salt diet and to elevate feet slightly while seated.    Debility  Active. Spends most of her time sitting. Reports weakness and shortness of breath with exertion. Homebound due to knee OA.         Were controlled substances prescribed?  No    Follow Up Appointments:   11/21/2019 10am with [facility]    Signature:  Isabel Quintero NP

## 2019-11-26 ENCOUNTER — CARE AT HOME (OUTPATIENT)
Dept: HOME HEALTH SERVICES | Facility: CLINIC | Age: 84
End: 2019-11-26
Payer: MEDICARE

## 2019-11-26 DIAGNOSIS — R53.81 DEBILITY: ICD-10-CM

## 2019-11-26 DIAGNOSIS — H90.3 SENSORINEURAL HEARING LOSS (SNHL) OF BOTH EARS: ICD-10-CM

## 2019-11-26 DIAGNOSIS — M17.0 PRIMARY OSTEOARTHRITIS OF BOTH KNEES: ICD-10-CM

## 2019-11-26 DIAGNOSIS — I10 ESSENTIAL HYPERTENSION: ICD-10-CM

## 2019-11-26 DIAGNOSIS — N18.30 CKD (CHRONIC KIDNEY DISEASE) STAGE 3, GFR 30-59 ML/MIN: ICD-10-CM

## 2019-11-26 DIAGNOSIS — R60.0 BILATERAL LOWER EXTREMITY EDEMA: ICD-10-CM

## 2019-11-26 DIAGNOSIS — D69.2 SENILE PURPURA: ICD-10-CM

## 2019-11-26 DIAGNOSIS — I50.32 CHRONIC DIASTOLIC CONGESTIVE HEART FAILURE: ICD-10-CM

## 2019-11-26 DIAGNOSIS — G31.84 MILD COGNITIVE IMPAIRMENT: Primary | ICD-10-CM

## 2019-11-26 PROCEDURE — 1101F PR PT FALLS ASSESS DOC 0-1 FALLS W/OUT INJ PAST YR: ICD-10-PCS | Mod: CPTII,S$GLB,, | Performed by: NURSE PRACTITIONER

## 2019-11-26 PROCEDURE — 99348 HOME/RES VST EST LOW MDM 30: CPT | Mod: ,,, | Performed by: NURSE PRACTITIONER

## 2019-11-26 PROCEDURE — 1159F PR MEDICATION LIST DOCUMENTED IN MEDICAL RECORD: ICD-10-PCS | Mod: S$GLB,,, | Performed by: NURSE PRACTITIONER

## 2019-11-26 PROCEDURE — 1101F PT FALLS ASSESS-DOCD LE1/YR: CPT | Mod: CPTII,S$GLB,, | Performed by: NURSE PRACTITIONER

## 2019-11-26 PROCEDURE — 1159F MED LIST DOCD IN RCRD: CPT | Mod: S$GLB,,, | Performed by: NURSE PRACTITIONER

## 2019-11-26 PROCEDURE — 99348 PR HOME VISIT,ESTAB PATIENT,LEVEL II: ICD-10-PCS | Mod: ,,, | Performed by: NURSE PRACTITIONER

## 2019-11-27 NOTE — PATIENT INSTRUCTIONS
Leg Swelling in Both Legs    Swelling of the feet, ankles, and legs is called edema. It is caused by excess fluid that has collected in the tissues. Extra fluid in the body settles in the lowest part because of gravity. This is why the legs and feet are most affected.  Some of the causes for edema include:  · Disease of the heart like congestive heart failure  · Standing or sitting for long periods of time  · Infection of the feet or legs  · Blood pooling in the veins of your legs (venous insufficiency)  · Dilated veins in your lower leg (varicose veins)  · Garters or other clothing that is tight on your legs. This will cause blood to pool in your legs because the clothing limits blood flow.  · Some medicines such as hormones like birth control pills, some blood pressure medicines like calcium channel blockers (amlodipine) and steroids, some antidepressants like MAO inhibitors and tricyclics  · Menstrual periods that cause you to retain fluids  · Many types of renal disease  · Liver failure or cirrhosis  · Pregnancy, some swelling is normal, but a sudden increase in leg swelling or weight gain can be a sign of a dangerous complication of pregnancy  · Poor nutrition  · Thyroid disease  Medical treatment will depend on what is causing the swelling in your legs. Your healthcare provider may prescribe water pills (diuretics) to get rid of the extra fluid.  Home care  Follow these guidelines when caring for yourself at home:  · Don't wear clothing like garters that is tight on your legs.  · Keep your legs up while lying or sitting.  · If infection, injury, or recent surgery is causing the swelling, stay off your legs as much as possible until symptoms get better.  · If your healthcare provider says that your leg swelling is caused by venous insufficiency or varicose veins, don't sit or  one place for long periods of time. Take breaks and walk about every few hours. Brisk walking is a good exercise. It helps  circulate the blood that has collected in your leg. Talk with your provider about using support stockings to stop daytime leg swelling.  · If your provider says that heart disease is causing your leg swelling, follow a low-salt diet to stop extra fluid from staying in your body. You may also need medicine.  Follow-up care  Follow up with your healthcare provider, or as advised.  When to seek medical advice  Call your healthcare provider right away if any of these occur:  · New shortness of breath or chest pain  · Shortness of breath or chest pain that gets worse  · Swelling in both legs or ankles that gets worse  · Swelling of the abdomen  · Redness, warmth, or swelling in one leg  · Fever of 100.4ºF (38ºC) or higher, or as directed by your healthcare provider  · Yellow color to your skin or eyes  · Rapid, unexplained weight gain  · Having to sleep upright or use an increased number of pillows  Date Last Reviewed: 3/31/2016  © 4940-3019 [employer]. [address] [place] [address], [place] [address]. All rights reserved. This information is not intended as a substitute for professional medical care. Always follow your healthcare professional's instructions.

## 2019-11-27 NOTE — PROGRESS NOTES
"[facility]  Medical Home Visit    Visit Date: 11/26/2019  Encounter Provider: Isabel Quintero NP  PCP:  Rachel Millard MD    Subjective:      Patient ID: Fracisco Pires is a 89 y.o. female.    Consult Requested By:  Dr. Millard  Reason for Consult:  Medical follow up on homebound patient    Fracisco is being seen at home due to mobility and transportation limitations    Chief Complaint: Follow-up    Patient seen today for follow up. She is found sitting at her kitchen table drinking a can of coke. She reports "I am doing fine" and has no complaints. She does state that she is "not up to having blood work done today" and refuses to let me attempt to draw labs. Her spouse is present today during this visit but is being seen by his home health nurse. The patient reports that she takes her medications "some days when I remember". Her pill box is present on the table with some medication absent and some present. She continues to have swelling in both feet and reports that she is not weighing herself, but I had her stand on the scale today during this visit. She also reports that she is not adding salt to any of her food and that she will elevate her feet from time to time. She denies any shortness of breath but does get moderately dyspneic with mild activity today during this visit.        Home Visit 10/21/19:  Patient is homebound due to bilateral knee osteoarthritis with unsteady gait and has not had any lab work done in over a year. She was seen by Dr. Millard in the home 10/10/19 and a medication pill box was filled for the patient. Upon arrival today it is noted that the pill box is only empty for 2 days and the rest is filled with 2 pills each, as Dr. Millard had filled for her. Patient report "sometimes I just don't remember to take my medicine". Patient's  lives in the home with her but reports "I have to keep up with my own medicines". I attempted to draw labs on patient three times but was " unsuccessful. Patient stated that she hasn't drank much fluids over the 24 hours.              Review of Systems   Constitutional: Negative.    HENT: Positive for hearing loss. Negative for trouble swallowing.    Eyes: Negative.    Respiratory: Positive for shortness of breath (with exertion) and wheezing (mild and with exertion). Negative for cough.    Gastrointestinal: Negative.    Endocrine: Negative.    Genitourinary: Negative.    Musculoskeletal: Positive for gait problem (unsteady).   Skin: Negative.    Allergic/Immunologic: Negative.    Hematological: Negative.    Psychiatric/Behavioral: Positive for decreased concentration (at times). Negative for agitation. The patient is not nervous/anxious.        Assessments:  ·  Environmental: safe, slightly cluttered, 2 medium sized dogs roaming the home, protective of patient  · · Functional Status: mostly independent, requires assistance with medication planning  · · Safety: none identified  · · Nutritional: adequate  · · Home Health/DME/Supplies: rollator  ·     Objective:   Physical Exam   Constitutional: She is oriented to person, place, and time. She appears well-developed and well-nourished.   HENT:   Head: Normocephalic and atraumatic.   Eyes: Pupils are equal, round, and reactive to light.   Neck: Normal range of motion. Neck supple.   Cardiovascular: Normal rate, regular rhythm, normal heart sounds and intact distal pulses.   No murmur heard.  Pulmonary/Chest: Effort normal. She has wheezes (mild and with exertion). Moderate dyspnea with standing for weighing on scale.  Abdominal: Soft. Bowel sounds are normal.   Musculoskeletal: She exhibits edema (2+ pitting edema to tops of both feet and ankles).   Neurological: She is alert and oriented to person, place, and time.   Skin: Skin is warm and dry. Capillary refill takes 2 to 3 seconds. No erythema. Scattered ecchymosis and purpura to BUE.  Psychiatric: She has a normal mood and affect. Her behavior is normal.    Forgetful at times          Vitals:    11/26/19 0934   BP: (!) (P) 142/80   Pulse: (P) 96   Resp: (P) 16   Temp: (P) 98.6 °F (37 °C)   SpO2: (P) 96%   Weight: (P) 82.6 kg (182 lb)   PainSc: (P) 0-No pain     Body mass index is 36.76 kg/m² (pended).    Assessment:     1. Mild cognitive impairment    2. Bilateral lower extremity edema    3. CKD (chronic kidney disease) stage 3, GFR 30-59 ml/min    4. Chronic diastolic congestive heart failure    5. Sensorineural hearing loss (SNHL) of both ears    6. Primary osteoarthritis of both knees    7. Essential hypertension    8. Senile purpura    9. Debility        Plan:     Ethical / Legal: Advance Care Planning   ·       Surrogate decision maker:  Eugenio Pires, Relationship: daughter  · · Code Status:  DNR  · · [place]:  On file  · · Other advance directive:  none, Capacity to make medical decisions:  intact, Conflict non identified  ·        Fracisco was seen today for follow-up.    Diagnoses and all orders for this visit:    Mild cognitive impairment  Stable.    Bilateral lower extremity edema  Active. Encouraged low salt diet and to elevate feet while seated.    CKD (chronic kidney disease) stage 3, GFR 30-59 ml/min  Patient refuses lab work today.    Chronic diastolic congestive heart failure  Stable. Encouraged to weigh self daily.  Follow low salt diet.    Sensorineural hearing loss (SNHL) of both ears  Stable.    Primary osteoarthritis of both knees  Stable.    Essential hypertension  Mildly elevated today.     Senile purpura  Stable. Skin intact.    Debility  Stable. Fall precautions and safety advised.      Were controlled substances prescribed?  No    Follow Up Appointments:   No future appointments.    Consent obtained from patient on visit today.    Signature:  Isabel Quintero NP

## 2019-12-26 ENCOUNTER — CARE AT HOME (OUTPATIENT)
Dept: HOME HEALTH SERVICES | Facility: CLINIC | Age: 84
End: 2019-12-26
Payer: MEDICARE

## 2019-12-26 VITALS
RESPIRATION RATE: 16 BRPM | WEIGHT: 181 LBS | DIASTOLIC BLOOD PRESSURE: 80 MMHG | SYSTOLIC BLOOD PRESSURE: 140 MMHG | OXYGEN SATURATION: 94 % | BODY MASS INDEX: 36.56 KG/M2 | HEART RATE: 80 BPM | TEMPERATURE: 98 F

## 2019-12-26 DIAGNOSIS — R53.81 DEBILITY: ICD-10-CM

## 2019-12-26 DIAGNOSIS — M17.0 PRIMARY OSTEOARTHRITIS OF BOTH KNEES: ICD-10-CM

## 2019-12-26 DIAGNOSIS — H90.3 SENSORINEURAL HEARING LOSS (SNHL) OF BOTH EARS: ICD-10-CM

## 2019-12-26 DIAGNOSIS — H00.012 HORDEOLUM EXTERNUM OF RIGHT LOWER EYELID: Primary | ICD-10-CM

## 2019-12-26 DIAGNOSIS — I10 ESSENTIAL HYPERTENSION: ICD-10-CM

## 2019-12-26 DIAGNOSIS — I50.32 CHRONIC DIASTOLIC CONGESTIVE HEART FAILURE: ICD-10-CM

## 2019-12-26 DIAGNOSIS — G31.84 MILD COGNITIVE IMPAIRMENT: ICD-10-CM

## 2019-12-26 DIAGNOSIS — R60.0 BILATERAL LOWER EXTREMITY EDEMA: ICD-10-CM

## 2019-12-26 DIAGNOSIS — N18.30 CKD (CHRONIC KIDNEY DISEASE) STAGE 3, GFR 30-59 ML/MIN: ICD-10-CM

## 2019-12-26 DIAGNOSIS — Z91.148 NONCOMPLIANCE WITH MEDICATION REGIMEN: ICD-10-CM

## 2019-12-26 PROCEDURE — 99348 PR HOME VISIT,ESTAB PATIENT,LEVEL II: ICD-10-PCS | Mod: ,,, | Performed by: NURSE PRACTITIONER

## 2019-12-26 PROCEDURE — 99348 HOME/RES VST EST LOW MDM 30: CPT | Mod: ,,, | Performed by: NURSE PRACTITIONER

## 2019-12-26 PROCEDURE — 1101F PR PT FALLS ASSESS DOC 0-1 FALLS W/OUT INJ PAST YR: ICD-10-PCS | Mod: CPTII,S$GLB,, | Performed by: NURSE PRACTITIONER

## 2019-12-26 PROCEDURE — 1101F PT FALLS ASSESS-DOCD LE1/YR: CPT | Mod: CPTII,S$GLB,, | Performed by: NURSE PRACTITIONER

## 2019-12-26 PROCEDURE — 1126F AMNT PAIN NOTED NONE PRSNT: CPT | Mod: S$GLB,,, | Performed by: NURSE PRACTITIONER

## 2019-12-26 PROCEDURE — 1159F MED LIST DOCD IN RCRD: CPT | Mod: S$GLB,,, | Performed by: NURSE PRACTITIONER

## 2019-12-26 PROCEDURE — 1126F PR PAIN SEVERITY QUANTIFIED, NO PAIN PRESENT: ICD-10-PCS | Mod: S$GLB,,, | Performed by: NURSE PRACTITIONER

## 2019-12-26 PROCEDURE — 1159F PR MEDICATION LIST DOCUMENTED IN MEDICAL RECORD: ICD-10-PCS | Mod: S$GLB,,, | Performed by: NURSE PRACTITIONER

## 2019-12-26 NOTE — PROGRESS NOTES
"[facility]  Medical Home Visit    Visit Date: 12/26/2019  Encounter Provider: Isabel Quintero NP  PCP:  Rachel Millard MD    Subjective:      Patient ID: Fracisco Pires is a 89 y.o. female.    Consult Requested By:  Dr. Millard  Reason for Consult:  Medical follow up, homebound patient    Fracisco is being seen at home due to mobility and transportation limitations.      Chief Complaint: Follow-up and Hypertension    Fracisco Pires is a 89 y.o. female.  with hx of osteoarthritis of both knees s/p TKR, debility, homebound status, obesity, HTN, HLD, NSTEMI, hx of mutliple hospitalizations for sepsis due to UTI and cellulitis, chronic diastolic dysufnction, interstital fibrosis.    Fracisco is seen today in her home for a medical follow up. She is AAO x 3 today and reports that she has a stye on her right eye that appeared a few days ago. She denies that it is hurting and feels that it is getting smaller already.    She reports that she is not taking her medications as prescribed by Dr. Rivera. She states "I just don't think about it". When asked if she would like her pill box filled for her she states "No, I am an adult". Her spouse is present today at the kitchen table during this visit. Both he and the patient argue continuously during the visit. He states that he has his own medical problems and medications to tend to. He tells her she needs Home Health to come and fill her pill box and she gets angry and tells him to mind his own business. Patient is hard of hearing and seems to be suspicious if I speak to her  and she cannot hear. She wants to make all medical decisions for herself.     Extensive teaching done today on medication compliance, disease process of hypertension, CHF, swelling of both feet and avoidance of salt. Patient states that she does not want Home Health and "I will handle my medicine myself".    Her gait is unsteady and she uses a rollator for ambulation assistance. No " "recent falls have been reported.    She also continues to refuse blood work that was ordered by Dr. Millard in the past.     Appetite is reported to be "good". She heats up meals in the microwave for herself.     Review of Systems   Constitutional: Negative.    HENT: Positive for hearing loss. Negative for trouble swallowing.    Eyes: Right eye stye developed over the last few days, reports that it is getting smaller.  Respiratory: Positive for mild shortness of breath (with exertion) and wheezing (mild and with exertion). Negative for cough.    Gastrointestinal: Negative.    Endocrine: Negative.    Genitourinary: Negative.    Musculoskeletal: Positive for gait problem (unsteady).   Skin: Negative.    Allergic/Immunologic: Negative.    Hematological: Negative.    Psychiatric/Behavioral: Positive for decreased concentration (at times). Negative for agitation. The patient is not nervous/anxious.        Assessments:               Environmental: safe, slightly cluttered, 2 medium sized dogs roaming the home, protective of patient               Functional Status: mostly independent, requires assistance with medication planning                 Safety: none identified    Nutritional: adequate               Home Health/DME/Supplies: rollator    Objective:   Physical Exam   Constitutional: She is oriented to person, place, and time. She appears well-developed and well-nourished.   HENT:   Head: Normocephalic and atraumatic.   Eyes: Pupils are equal, round, and reactive to light. Right lower eyelid with small stye, no edema, mild erythema surrounding stye, no tenderness reported.  Neck: Normal range of motion. Neck supple.   Cardiovascular: Normal rate, regular rhythm, normal heart sounds and intact distal pulses.   No murmur heard.  Pulmonary/Chest: Effort normal. She has wheezes (mild and with exertion). Moderate dyspnea with standing for weighing on scale.  Abdominal: Soft. Bowel sounds are normal.   Musculoskeletal: She " exhibits edema (2+ pitting edema to tops of both feet and ankles).   Neurological: She is alert and oriented to person, place, and time.   Skin: Skin is warm and dry. Capillary refill takes 2 to 3 seconds. No erythema. Scattered ecchymosis and purpura to BUE.  Psychiatric: She has a normal mood and affect. Her behavior is normal.   Forgetful at times       Vitals:    12/26/19 1202   BP: (!) 140/80   Pulse: 80   Resp: 16   Temp: 98.3 °F (36.8 °C)   TempSrc: Temporal   SpO2: (!) 94%   Weight: 82.1 kg (181 lb)   PainSc: 0-No pain     Body mass index is 36.56 kg/m².    Assessment:     1. Hordeolum externum of right lower eyelid    2. Mild cognitive impairment    3. Bilateral lower extremity edema    4. Noncompliance with medication regimen    5. CKD (chronic kidney disease) stage 3, GFR 30-59 ml/min    6. Chronic diastolic congestive heart failure    7. Primary osteoarthritis of both knees    8. Debility    9. Essential hypertension    10. Sensorineural hearing loss (SNHL) of both ears        Plan:     Ethical / Legal: Advance Care Planning   Surrogate decision maker:  Name Maris Pires, Relationship: daughter  Code Status:  DNR  [place]POST:  On file  Other advance directive:  none, Capacity to make medical decisions:  intact, Conflict non identified    Edwigemggertrude was seen today for follow-up and hypertension.    Diagnoses and all orders for this visit:    Hordeolum externum of right lower eyelid    Mild cognitive impairment    Bilateral lower extremity edema    Noncompliance with medication regimen    CKD (chronic kidney disease) stage 3, GFR 30-59 ml/min    Chronic diastolic congestive heart failure    Primary osteoarthritis of both knees    Debility    Essential hypertension    Sensorineural hearing loss (SNHL) of both ears    Follow low salt diet.  Take all medications as prescribed.  Fall precautions and safety advised.  Elevate feel while seated.   Apply warm compress to right eye 3-4 times a day. Call NP if stye  worsens.    Were controlled substances prescribed?  No    Follow Up Appointments:   Future Appointments   Date Time Provider Department Center   2/27/2020  2:00 PM Rachel Millard MD [facility]   Consent obtained from patient on visit today.     Signature:  Isabel Quintero NP

## 2019-12-27 NOTE — PATIENT INSTRUCTIONS
Sty (or Stye)  A sty is an infection of the oil gland of the eyelid. It may develop into a small pocket of pus (abscess). This can cause pain, redness, and swelling. In early stages, styes are treated with antibiotic cream, eye drops, or warm packs (small towels soaked in warm water). More severe cases may need to be opened and drained by a health care provider.  Home care  · Eye drops or ointment are usually prescribed to treat the infection. Use these as directed.   ¨ Artificial tears may also be used to lubricate the eye and make it more comfortable. These may be purchased without a prescription.   ¨ Talk to your health care provider before using any over-the-counter treatment for a sty.  · Apply a warm, damp towel to the affected eye for at least 5 minutes, 3 to 4 times a day for a week. Warm compresses open the pores and speed the healing. If the compresses are too hot, they may burn your eyelid.  · Sometimes the sty will drain with this treatment alone. If this happens, continue the antibiotic until all the redness and swelling are gone.  · Wash your hands before and after touching the infected eye to avoid spreading the infection.  · Do not squeeze or try to puncture the sty.  Follow-up care  Follow up with your health care provider, or as advised.   When to seek medical advice  Call your health care provider right away if you have:  · Increase in swelling or redness around the eyelid after 48 to 72 hours  · Increase in eye pain or the eyelid blisters  · Increase in warmth--the eyelid feels hot  · Drainage of blood or thick pus from the sty  · Blister on the eyelid  · Inability to open the eyelid due to swelling  · Fever  ¨ 1 degree above your normal temperature lasting for 24 to 48 hours, or  ¨ Whatever your health care provider told you to report based on your medical condition  · Vision changes  · Headache or stiff neck  · Recurrence of the sty  Date Last Reviewed: 6/14/2015  © 4490-2198 [employer]. [address] [place] [address], [place] [address]. All rights reserved. This information is not intended as a substitute for professional medical care. Always follow your healthcare professional's instructions.

## 2020-01-17 ENCOUNTER — TELEPHONE (OUTPATIENT)
Dept: FAMILY MEDICINE | Facility: CLINIC | Age: 85
End: 2020-01-17

## 2020-01-17 NOTE — TELEPHONE ENCOUNTER
"----- Message from Irene Urrutia sent at 1/16/2020  6:08 PM CST -----  Good Evening,     Patient spouse Gabriele stopped in the clinic today to advise that he is applying for a [facility] an in order for him to complete the application he needs a "Medical Statement" from her provider .    He advised that he can be reached at [phone]    He left an empty envelope with postage to so that the letter can be mailed back to him.   "

## 2020-02-27 ENCOUNTER — OFFICE VISIT (OUTPATIENT)
Dept: PRIMARY CARE CLINIC | Facility: CLINIC | Age: 85
End: 2020-02-27
Payer: MEDICARE

## 2020-02-27 VITALS
SYSTOLIC BLOOD PRESSURE: 136 MMHG | WEIGHT: 180.38 LBS | DIASTOLIC BLOOD PRESSURE: 76 MMHG | RESPIRATION RATE: 12 BRPM | BODY MASS INDEX: 36.44 KG/M2 | OXYGEN SATURATION: 95 % | HEART RATE: 98 BPM

## 2020-02-27 DIAGNOSIS — J84.9 ILD (INTERSTITIAL LUNG DISEASE): ICD-10-CM

## 2020-02-27 DIAGNOSIS — M79.89 LEG SWELLING: ICD-10-CM

## 2020-02-27 DIAGNOSIS — M17.0 PRIMARY OSTEOARTHRITIS OF BOTH KNEES: Primary | ICD-10-CM

## 2020-02-27 DIAGNOSIS — I10 ESSENTIAL HYPERTENSION: Chronic | ICD-10-CM

## 2020-02-27 DIAGNOSIS — G31.84 MILD COGNITIVE IMPAIRMENT: ICD-10-CM

## 2020-02-27 DIAGNOSIS — D69.2 SENILE PURPURA: ICD-10-CM

## 2020-02-27 DIAGNOSIS — R60.0 BILATERAL LOWER EXTREMITY EDEMA: ICD-10-CM

## 2020-02-27 DIAGNOSIS — N18.30 CKD (CHRONIC KIDNEY DISEASE) STAGE 3, GFR 30-59 ML/MIN: Chronic | ICD-10-CM

## 2020-02-27 DIAGNOSIS — Z91.148 NONCOMPLIANCE WITH MEDICATION REGIMEN: ICD-10-CM

## 2020-02-27 PROCEDURE — 99350 PR HOME VISIT,ESTAB PATIENT,LEVEL IV: ICD-10-PCS | Mod: ,,, | Performed by: INTERNAL MEDICINE

## 2020-02-27 PROCEDURE — 99350 HOME/RES VST EST HIGH MDM 60: CPT | Mod: ,,, | Performed by: INTERNAL MEDICINE

## 2020-02-27 RX ORDER — FUROSEMIDE 20 MG/1
20 TABLET ORAL DAILY
Qty: 90 TABLET | Refills: 3 | Status: ON HOLD | OUTPATIENT
Start: 2020-02-27 | End: 2020-10-30 | Stop reason: SDUPTHER

## 2020-02-27 NOTE — PROGRESS NOTES
Primary Care Provider Appointment    Subjective:      Patient ID: Irmggertrude Pires is a 89 y.o. female osteoarthritis of both knees s/p TKR, debility, homebound status, obesity, HTN, HLD, NSTEMI, h[facility] of [facility] for sepsis due to UTI and cellulitis, chronic diastolic dysufnction, interstital fibrosis    Chief Complaint: No chief complaint on file.    Seen in home today due to homebound status   Her  and neighbor Brigid  also is present   Pt reports she is doing well , she is sitting at her table as usual.     House appears in much better condition.   Brigid comes 3x a week to help with laundry, meal preparation and anything the the Pranay's need.     Pt is not taking any medications, has two bottles of metoprolol and plavix  dated June 2019 with half of bottle of pills.   She has knee pain with walking but no pain while sitting.   She is very sedentary.  Constipation: taking metamucil and prune juice     Pt wishes that she could more, she is willing to work with PT if they can come see her. Her motivation is to get around better.         Past Surgical History:   Procedure Laterality Date    CATARACT EXTRACTION      os d 11/6/13// od d 4/16/14//    EYE SURGERY      both eyes    JOINT REPLACEMENT      bilateral knee replacement       Past Medical History:   Diagnosis Date    Arthritis     Asthma     recent bronchits treated and resoved with Levaquin. Last Nov 2011.    Cataract     ou done//    Diabetes mellitus     diet controlled    GERD (gastroesophageal reflux disease)     Hypertension     Phlebitis and thrombophlebitis of the leg 2005 before    Skin lesion of hand        Social History     Socioeconomic History    Marital status:      Spouse name: Gabriele     Number of children: 1    Years of education: Not on file    Highest education level: Not on file   Occupational History     Employer: retired   Social Needs    Financial resource strain: Somewhat hard    Food  insecurity:     Worry: Never true     Inability: Never true    Transportation needs:     Medical: No     Non-medical: No   Tobacco Use    Smoking status: Former Smoker     Packs/day: 1.50     Years: 50.00     Pack years: 75.00     Types: Cigarettes     Last attempt to quit: 1990     Years since quittin.2   Substance and Sexual Activity    Alcohol use: Yes     Alcohol/week: 14.0 standard drinks     Types: 14 Glasses of wine per week     Frequency: 4 or more times a week     Drinks per session: 3 or 4     Binge frequency: Daily or almost daily     Comment: 1-3 glasses of wine daily    Drug use: No    Sexual activity: Not Currently   Lifestyle    Physical activity:     Days per week: 0 days     Minutes per session: 0 min    Stress: Not at all   Relationships    Social connections:     Talks on phone: More than three times a week     Gets together: Once a week     Attends Tenriism service: Never     Active member of club or organization: No     Attends meetings of clubs or organizations: Never     Relationship status:    Other Topics Concern    Not on file   Social History Narrative    Retired     Originally from [place], moved to [place] in the 1970s.      for over 60 years     Gnosticist - Confucianism members visit her weekly on Friday to give [facility]    Has 1 dtr Maris who lives 2 hours away     Worked in retail sales, owned pool supply store    Has 2 dogs         Review of Systems   Constitutional: Negative for unexpected weight change.   Respiratory: Negative for shortness of breath and wheezing.    Cardiovascular: Negative for chest pain and palpitations.   Gastrointestinal: Negative for abdominal pain, constipation and diarrhea.   Musculoskeletal: Positive for arthralgias and gait problem. Negative for myalgias.   Psychiatric/Behavioral: Negative for confusion and dysphoric mood.   All other systems reviewed and are negative.      Objective:   /76   Pulse 98   Resp 12    Wt 81.8 kg (180 lb 6.4 oz)   LMP 12/08/1978   SpO2 95%   BMI 36.44 kg/m²     Physical Exam   Constitutional: She is oriented to person, place, and time. She appears well-developed and well-nourished.   HENT:   Head: Normocephalic.   Eyes: Conjunctivae are normal.   Cardiovascular: Normal rate, regular rhythm and normal heart sounds.   2+ LE edema in both feet bilaterally up to ankles    Pulmonary/Chest: Effort normal and breath sounds normal.   Abdominal: Soft. Bowel sounds are normal. There is no tenderness.   Musculoskeletal: She exhibits deformity.   Bilateral knee crepitus    Neurological: She is alert and oriented to person, place, and time.   Skin: Skin is warm and dry.   No blisters on legs   Few bruises on arms   Thin skin    Psychiatric: She has a normal mood and affect. Her behavior is normal.       Lab Results   Component Value Date    WBC 4.94 10/08/2018    HGB 14.1 10/08/2018    HCT 45.9 10/08/2018     10/08/2018    CHOL 140 11/27/2014    TRIG 48 11/27/2014    HDL 55 11/27/2014    ALT 11 07/19/2017    AST 24 07/19/2017     01/14/2019    K 4.2 01/14/2019     01/14/2019    CREATININE 0.9 01/14/2019    BUN 15 01/14/2019    CO2 26 01/14/2019    TSH 0.467 11/27/2014    HGBA1C 6.1 01/20/2016       RESULTS: Reviewed labs and images today            Assessment:   89 y.o. female with multiple co-morbid illnesses here to continue work-up of chronic issues notably osteoarthritis of both knees s/p TKR, debility, homebound status, obesity, HTN, HLD, NSTEMI, hx of [facility] hospitalizations for sepsis due to UTI and cellulitis, chronic diastolic dysufnction, interstital fibrosis      Plan:     Problem List Items Addressed This Visit        Neuro    Mild cognitive impairment     Pt scored 16/30 on slums but is able to hold conversation well   Pt feels well and that is why she will not take medications   She does not have interest in taking medications which is the issue with compliance  Due to  functional limitations, pt has to rely on  for most iadls but she can do her adls herself.   Cont to monitor   If  was to become ill wife would move with daughter                Psychiatric    Noncompliance with medication regimen     Removed all medications from her list and discharged old meds  Pt agree to try lasix 20 mg daily for LE swelling - sent to pharmacy             Pulmonary    ILD (interstitial lung disease)     Noted on imaging   Pt reports no issues with sob with just rest but does have sob on exertion which is more likely to deconditioning   Lungs clear on exam  Pt has not used inhalers, feels she does not need them   Monitor             Cardiac/Vascular    Hypertension (Chronic)     In good range off meds   Monitor             Renal/    CKD (chronic kidney disease) stage 3, GFR 30-59 ml/min (Chronic)     No labs in one year  Will have  complete labs             Hematology    Senile purpura     Due to old age  Removed aspirin and plavix from med list- pt not taking monitor             Orthopedic    Primary osteoarthritis of both knees - Primary     Advised to at least try extra strength tylenol 1 gm before PT arrives in order for her to participate, may need more medication.   Otherwise with rest pain resolves with patient          Relevant Orders    Ambulatory referral/consult to Home Health       Other    Bilateral lower extremity edema    Relevant Medications    furosemide (LASIX) 20 MG tablet      Other Visit Diagnoses     Leg swelling        Relevant Medications    furosemide (LASIX) 20 MG tablet          Health Maintenance       Date Due Completion Date    TETANUS VACCINE 03/08/1947 ---    Shingles Vaccine (1 of 2) 03/08/1979 ---    Influenza Vaccine (1) 09/01/2019 11/3/2017    Lipid Panel 11/27/2019 11/27/2014    Aspirin/Antiplatelet Therapy 06/10/2020 6/10/2019           Follow up in 3  months. Total face-to-face time was 60 min, 50% of this was spent on counseling and  coordination of care. The following issues were discussed: osteoarthritis of both knees s/p TKR, debility, homebound status, obesity, HTN, HLD, NSTEMI, hx of mulitple hospitalizations for sepsis due to UTI and cellulitis, chronic diastolic dysufnction, interstital fibrosis.      Rachel Millard MD  Internal Medicine- Geriatrics  [facility] [place]

## 2020-02-28 ENCOUNTER — TELEPHONE (OUTPATIENT)
Dept: PRIMARY CARE CLINIC | Facility: CLINIC | Age: 85
End: 2020-02-28

## 2020-02-28 NOTE — TELEPHONE ENCOUNTER
----- Message from Rachel Millard MD sent at 2/28/2020  4:54 PM CST -----  Please  know to  meds from [facility] for Ms. Pires - just one medication which is her lasix.

## 2020-02-28 NOTE — ASSESSMENT & PLAN NOTE
Advised to at least try extra strength tylenol 1 gm before PT arrives in order for her to participate, may need more medication.   Otherwise with rest pain resolves with patient

## 2020-02-28 NOTE — TELEPHONE ENCOUNTER
not at home, advised patient to let  know [facility]

## 2020-02-28 NOTE — ASSESSMENT & PLAN NOTE
Pt scored 16/30 on slums but is able to hold conversation well   Pt feels well and that is why she will not take medications   She does not have interest in taking medications which is the issue with compliance  Due to functional limitations, pt has to rely on  for most iadls but she can do her adls herself.   Cont to monitor   If  was to become ill wife would move with daughter

## 2020-02-28 NOTE — ASSESSMENT & PLAN NOTE
Noted on imaging   Pt reports no issues with sob with just rest but does have sob on exertion which is more likely to deconditioning   Lungs clear on exam  Pt has not used inhalers, feels she does not need them   Monitor

## 2020-02-28 NOTE — ASSESSMENT & PLAN NOTE
Removed all medications from her list and discharged old meds  Pt agree to try lasix 20 mg daily for LE swelling - sent to pharmacy

## 2020-03-02 NOTE — PROGRESS NOTES
Order given to draw cbc, cmp, vitamin d at visit for 3/2/2020.  Patient's  notified by phone and verbalized understanding.  He also confirmed that he did  her lasix prescription.

## 2020-03-04 ENCOUNTER — LAB VISIT (OUTPATIENT)
Dept: LAB | Facility: HOSPITAL | Age: 85
End: 2020-03-04
Attending: INTERNAL MEDICINE
Payer: MEDICARE

## 2020-03-04 DIAGNOSIS — M17.0 PRIMARY OSTEOARTHRITIS OF BOTH KNEES: ICD-10-CM

## 2020-03-04 DIAGNOSIS — I10 ESSENTIAL HYPERTENSION, MALIGNANT: Primary | ICD-10-CM

## 2020-03-04 LAB
ALBUMIN SERPL BCP-MCNC: 3 G/DL (ref 3.5–5.2)
ALP SERPL-CCNC: 75 U/L (ref 55–135)
ALT SERPL W/O P-5'-P-CCNC: 12 U/L (ref 10–44)
ANION GAP SERPL CALC-SCNC: 8 MMOL/L (ref 8–16)
AST SERPL-CCNC: 29 U/L (ref 10–40)
BASOPHILS # BLD AUTO: 0.08 K/UL (ref 0–0.2)
BASOPHILS NFR BLD: 1.7 % (ref 0–1.9)
BILIRUB SERPL-MCNC: 1.2 MG/DL (ref 0.1–1)
BUN SERPL-MCNC: 11 MG/DL (ref 8–23)
CALCIUM SERPL-MCNC: 9.2 MG/DL (ref 8.7–10.5)
CHLORIDE SERPL-SCNC: 106 MMOL/L (ref 95–110)
CO2 SERPL-SCNC: 25 MMOL/L (ref 23–29)
CREAT SERPL-MCNC: 0.8 MG/DL (ref 0.5–1.4)
DIFFERENTIAL METHOD: ABNORMAL
EOSINOPHIL # BLD AUTO: 0.4 K/UL (ref 0–0.5)
EOSINOPHIL NFR BLD: 7.7 % (ref 0–8)
ERYTHROCYTE [DISTWIDTH] IN BLOOD BY AUTOMATED COUNT: 13.9 % (ref 11.5–14.5)
EST. GFR  (AFRICAN AMERICAN): >60 ML/MIN/1.73 M^2
EST. GFR  (NON AFRICAN AMERICAN): >60 ML/MIN/1.73 M^2
GLUCOSE SERPL-MCNC: 92 MG/DL (ref 70–110)
HCT VFR BLD AUTO: 45.6 % (ref 37–48.5)
HGB BLD-MCNC: 14.3 G/DL (ref 12–16)
IMM GRANULOCYTES # BLD AUTO: 0.01 K/UL (ref 0–0.04)
IMM GRANULOCYTES NFR BLD AUTO: 0.2 % (ref 0–0.5)
LYMPHOCYTES # BLD AUTO: 1.9 K/UL (ref 1–4.8)
LYMPHOCYTES NFR BLD: 40.2 % (ref 18–48)
MCH RBC QN AUTO: 32.1 PG (ref 27–31)
MCHC RBC AUTO-ENTMCNC: 31.4 G/DL (ref 32–36)
MCV RBC AUTO: 103 FL (ref 82–98)
MONOCYTES # BLD AUTO: 0.6 K/UL (ref 0.3–1)
MONOCYTES NFR BLD: 11.9 % (ref 4–15)
NEUTROPHILS # BLD AUTO: 1.8 K/UL (ref 1.8–7.7)
NEUTROPHILS NFR BLD: 38.3 % (ref 38–73)
NRBC BLD-RTO: 0 /100 WBC
PLATELET # BLD AUTO: 221 K/UL (ref 150–350)
PMV BLD AUTO: 10.7 FL (ref 9.2–12.9)
POTASSIUM SERPL-SCNC: 4.2 MMOL/L (ref 3.5–5.1)
PROT SERPL-MCNC: 7.4 G/DL (ref 6–8.4)
RBC # BLD AUTO: 4.45 M/UL (ref 4–5.4)
SODIUM SERPL-SCNC: 139 MMOL/L (ref 136–145)
WBC # BLD AUTO: 4.78 K/UL (ref 3.9–12.7)

## 2020-03-04 PROCEDURE — 82306 VITAMIN D 25 HYDROXY: CPT

## 2020-03-04 PROCEDURE — 80053 COMPREHEN METABOLIC PANEL: CPT

## 2020-03-04 PROCEDURE — 85025 COMPLETE CBC W/AUTO DIFF WBC: CPT

## 2020-03-05 LAB — 25(OH)D3+25(OH)D2 SERPL-MCNC: 8 NG/ML (ref 30–96)

## 2020-03-09 PROBLEM — E55.9 VITAMIN D DEFICIENCY: Status: ACTIVE | Noted: 2020-03-09

## 2020-03-09 PROBLEM — E88.09 HYPOALBUMINEMIA: Status: ACTIVE | Noted: 2020-03-09

## 2020-03-09 RX ORDER — ERGOCALCIFEROL 1.25 MG/1
50000 CAPSULE ORAL
Qty: 12 CAPSULE | Refills: 0 | Status: SHIPPED | OUTPATIENT
Start: 2020-03-09

## 2020-03-27 ENCOUNTER — CARE AT HOME (OUTPATIENT)
Dept: HOME HEALTH SERVICES | Facility: CLINIC | Age: 85
End: 2020-03-27
Payer: MEDICARE

## 2020-03-27 DIAGNOSIS — I50.32 CHRONIC DIASTOLIC CONGESTIVE HEART FAILURE: ICD-10-CM

## 2020-03-27 DIAGNOSIS — G31.84 MILD COGNITIVE IMPAIRMENT: ICD-10-CM

## 2020-03-27 DIAGNOSIS — E55.9 VITAMIN D DEFICIENCY: Primary | ICD-10-CM

## 2020-03-27 DIAGNOSIS — R60.0 BILATERAL LOWER EXTREMITY EDEMA: ICD-10-CM

## 2020-03-27 PROCEDURE — 99348 PR HOME VISIT,ESTAB PATIENT,LEVEL II: ICD-10-PCS | Mod: S$GLB,,, | Performed by: NURSE PRACTITIONER

## 2020-03-27 PROCEDURE — 1126F PR PAIN SEVERITY QUANTIFIED, NO PAIN PRESENT: ICD-10-PCS | Mod: S$GLB,,, | Performed by: NURSE PRACTITIONER

## 2020-03-27 PROCEDURE — 1101F PR PT FALLS ASSESS DOC 0-1 FALLS W/OUT INJ PAST YR: ICD-10-PCS | Mod: CPTII,S$GLB,, | Performed by: NURSE PRACTITIONER

## 2020-03-27 PROCEDURE — 1159F MED LIST DOCD IN RCRD: CPT | Mod: S$GLB,,, | Performed by: NURSE PRACTITIONER

## 2020-03-27 PROCEDURE — 1101F PT FALLS ASSESS-DOCD LE1/YR: CPT | Mod: CPTII,S$GLB,, | Performed by: NURSE PRACTITIONER

## 2020-03-27 PROCEDURE — 99348 HOME/RES VST EST LOW MDM 30: CPT | Mod: S$GLB,,, | Performed by: NURSE PRACTITIONER

## 2020-03-27 PROCEDURE — 1159F PR MEDICATION LIST DOCUMENTED IN MEDICAL RECORD: ICD-10-PCS | Mod: S$GLB,,, | Performed by: NURSE PRACTITIONER

## 2020-03-27 PROCEDURE — 1126F AMNT PAIN NOTED NONE PRSNT: CPT | Mod: S$GLB,,, | Performed by: NURSE PRACTITIONER

## 2020-03-29 VITALS
SYSTOLIC BLOOD PRESSURE: 134 MMHG | TEMPERATURE: 98 F | WEIGHT: 180 LBS | BODY MASS INDEX: 36.36 KG/M2 | OXYGEN SATURATION: 98 % | RESPIRATION RATE: 16 BRPM | DIASTOLIC BLOOD PRESSURE: 70 MMHG | HEART RATE: 98 BPM

## 2020-03-29 NOTE — PROGRESS NOTES
"[facility]  Medical Home Visit    Visit Date: 3/27/2020  Encounter Provider: Isabel Quintero NP  PCP:  Rachel Millard MD    Subjective:      Patient ID: Fracisco Pires is a 89 y.o. female.    Consult Requested By:  No ref. provider found  Reason for Consult:  Medical Follow Up    Fracisco is being seen at home due to limited mobility, mild cognitive impairment    Chief Complaint: Leg swelling, vitamin D deficiency    Fracisco Pires is a 89 y.o. female.  with hx of osteoarthritis of both knees s/p TKR, debility, homebound status, obesity, HTN, HLD, NSTEMI, hx of mutliple hospitalizations for sepsis due to UTI and cellulitis, chronic diastolic dysufnction, interstital fibrosis.     Fracisco is seen today in her home for a medical follow up. She is AAO x 3 today and reports that she continues to have swelling in her legs with with the left leg being more problematic than the right leg.  She reports that she saw her PCP recently and is taking Lasix once a day as prescribed.     Patient is homebound and does not leave home. Her spouse if present today but he reports "I handle all of my own affairs and she handles all of her own". He does not know if she is taking her medications as prescribed. I counted her vitamin D capsules and she is on track with taking them as prescribed. The lasix tablets have some more than should be there but it does appear that she is taking it more than she is not. Medication compliance has been an issue in the past with Fracisco.     Education provided on Vitamin D deficiency and importance of supplementation and CHF, leg swelling- follow low salt diet and take Lasix as prescribed.     Her gait is unsteady and she uses a rollator for ambulation assistance. No recent falls have been reported.     Appetite is reported to be "good". She heats up meals in the microwave for herself.      Review of Systems   Constitutional: Negative.    HENT: Positive for hearing loss. Negative for " trouble swallowing.    Eyes: Right eye stye developed over the last few days, reports that it is getting smaller.  Respiratory: Positive for mild shortness of breath (with exertion) and wheezing (mild and with exertion). Negative for cough.    Cardiac: Negative.   Gastrointestinal: Negative.    Endocrine: Negative.    Genitourinary: Negative.    Musculoskeletal: Positive for gait problem (unsteady).  Positive for swelling in legs.  Skin: Negative.    Allergic/Immunologic: Negative.    Hematological: Negative.    Psychiatric/Behavioral: Positive for decreased concentration (at times). Negative for agitation. The patient is not nervous/anxious.          Assessments:               Environmental: safe, slightly cluttered, 2 medium sized dogs roaming the home, protective of patient               Functional Status: mostly independent, requires assistance with medication planning                 Safety: none identified               Nutritional: adequate               Home Health/DME/Supplies: rollator    Objective:     Physical Exam   Constitutional: She is oriented to person, place, and time. She appears well-developed and well-nourished.   HENT:   Head: Normocephalic and atraumatic.   Eyes: Pupils are equal, round, and reactive to light. Right lower eyelid with small stye, no edema, mild erythema surrounding stye, no tenderness reported.  Neck: Normal range of motion. Neck supple.   Cardiovascular: Normal rate, regular rhythm, normal heart sounds and intact distal pulses.   No murmur heard.  Pulmonary/Chest: Effort normal. She has wheezes (mild and with exertion). Moderate dyspnea with standing for weighing on scale.  Abdominal: Soft. Bowel sounds are normal.   Musculoskeletal: She exhibits edema (2+ pitting edema to tops of both feet and ankles Left > Right).  Neurological: She is alert and oriented to person, place, and time.   Skin: Skin is warm and dry. Capillary refill takes 2 to 3 seconds. No erythema. Scattered  ecchymosis and purpura to BUE.  Psychiatric: She has a normal mood and affect. Her behavior is normal.   Forgetful at times      Vitals:    03/27/20 0900   BP: 134/70   Pulse: 98   Resp: 16   Temp: 98.3 °F (36.8 °C)   TempSrc: Temporal   SpO2: 98%   Weight: 81.6 kg (180 lb)   PainSc: 0-No pain     Body mass index is 36.36 kg/m².    Assessment:     1. Vitamin D deficiency    2. Mild cognitive impairment    3. Bilateral lower extremity edema    4. Chronic diastolic congestive heart failure        Plan:     Ethical / Legal: Advance Care Planning   Surrogate decision maker:  Name Maris Pires, Relationship: daughter  Code Status:  DNR  [place]:  On file  Other advance directive:  none, Capacity to make medical decisions:  intact, Conflict non identified       Edwigemggertrude was seen today for follow-up and leg swelling.    Diagnoses and all orders for this visit:    Vitamin D deficiency  Continue taking Vit D prescription once a week as prescribed.     Mild cognitive impairment    Bilateral lower extremity edema  Follow low salt diet.  Take Lasix as prescribed.  Weigh self daily, keep log, notify PCP if weight gain of 3-5 lbs in 1 day.    Chronic diastolic congestive heart failure  Follow low salt diet.  Take Lasix as prescribed.  Weigh self daily, keep log, notify PCP if weight gain of 3-5 lbs in 1 day.      Were controlled substances prescribed?  No    Follow Up Appointments:   No future appointments.    Verbal consent obtained from patient for visit today.    Signature:  Isabel Quintero NP     Attestation:   Screening criteria to assess the level of the patient's risk for infection with COVID-19 as recommended by the CDC at the time of the above documented home visit concluded appropriateness to proceed. Universal precautions were maintained at all times, including provider use of 60% alcohol gel hand  immediately prior to entry and upon departure of patient's home as well as cleaning of equipment used in home  visit with antibacterial/germicidal disposable wipes.

## 2020-03-29 NOTE — PATIENT INSTRUCTIONS
Ergocalciferol, Vitamin D2 oral drops and solution  What is this medicine?  ERGOCALCIFEROL (er venkata Lazo role) is a man-made vitamin D. It helps the body maintain the right amount of calcium and phosphorus. This medicine is used to prevent and to treat low vitamin D, to promote strong bones and teeth, and in the treatment of some diseases.  How should I use this medicine?  Take this medicine by mouth. Follow the directions on the prescription label. Use a specially marked spoon or container to measure each dose. Ask your pharmacist if you do not have one. Household spoons are not accurate. This medicine can be taken directly into the mouth or added to cereal, fruit juice, or other foods. Take your medicine at regular intervals. Do not take it more often than directed. Do not stop taking except on your doctor's advice.  Talk to your pediatrician regarding the use of this medicine in children. While this drug may be prescribed for selected conditions, precautions do apply.  What side effects may I notice from receiving this medicine?  Side effects that you should report to your doctor or health care professional as soon as possible:  · allergic reactions like skin rash, itching or hives, swelling of the face, lips, or tongue  · bone pain  · high blood pressure  · increased thirst  · increased urination (especially at night)  · irregular heartbeat  · seizures  · unusually weak or tired  Side effects that usually do not require medical attention (report to your doctor or health care professional if they continue or are bothersome):  · constipation  · dry mouth  · headache  · loss of appetite  · metallic taste  · stomach upset  What may interact with this medicine?  · antacids  · digoxin  · diuretics  · medicines for cholesterol like colestipol or cholestyramine  · medicines to treat seizures or nerve pain  · mineral oil  · orlistat  What if I miss a dose?  If you miss a dose, take it as soon as you can. If it is  almost time for your next dose, take only that dose. Do not take double or extra doses.  Where should I keep my medicine?  Keep out of the reach of children.  Store at room temperature between 15 and 30 degrees C (59 and 86 degrees F). Protect from light. Keep container tightly closed. Throw away any unused medicine after the expiration date.  What should I tell my health care provider before I take this medicine?  They need to know if you have any of the following conditions:  · kidney disease  · liver disease  · parathyroid disease  · stomach problems  · an unusual or allergic reaction to vitamin D, other medicines, foods, dyes, or preservatives  · pregnant or trying to get pregnant  · breast-feeding  What should I watch for while using this medicine?  Visit your doctor or health care professional for regular checks on your progress. Your doctor may order blood work while you are taking this vitamin.  You may need a special diet or other supplements. Ask your doctor or health care professional before you take any non-prescription medicines that have vitamin D, phosphorus, magnesium, or calcium (like antacids) in them. This can cause side effects.  Take this vitamin only if your doctor prescribes it or if you do not receive enough vitamins in your diet. A balanced diet should give you all of the vitamins you need. Fish and fish liver oils naturally contain vitamin D, and vitamin D is added to milk and bread. Also, your body makes vitamin D when you are in the sun.  NOTE:This sheet is a summary. It may not cover all possible information. If you have questions about this medicine, talk to your doctor, pharmacist, or health care provider. Copyright© 2017 [employer]        Leg Swelling in Both Legs    Swelling of the feet, ankles, and legs is called edema. It is caused by excess fluid that has collected in the tissues. Extra fluid in the body settles in the lowest part because of gravity. This is why the legs and  feet are most affected.  Some of the causes for edema include:  · Disease of the heart like congestive heart failure  · Standing or sitting for long periods of time  · Infection of the feet or legs  · Blood pooling in the veins of your legs (venous insufficiency)  · Dilated veins in your lower leg (varicose veins)  · Garters or other clothing that is tight on your legs. This will cause blood to pool in your legs because the clothing limits blood flow.  · Some medicines such as hormones like birth control pills, some blood pressure medicines like calcium channel blockers (amlodipine) and steroids, some antidepressants like MAO inhibitors and tricyclics  · Menstrual periods that cause you to retain fluids  · Many types of renal disease  · Liver failure or cirrhosis  · Pregnancy, some swelling is normal, but a sudden increase in leg swelling or weight gain can be a sign of a dangerous complication of pregnancy  · Poor nutrition  · Thyroid disease  Medical treatment will depend on what is causing the swelling in your legs. Your healthcare provider may prescribe water pills (diuretics) to get rid of the extra fluid.  Home care  Follow these guidelines when caring for yourself at home:  · Don't wear clothing like garters that is tight on your legs.  · Keep your legs up while lying or sitting.  · If infection, injury, or recent surgery is causing the swelling, stay off your legs as much as possible until symptoms get better.  · If your healthcare provider says that your leg swelling is caused by venous insufficiency or varicose veins, don't sit or  one place for long periods of time. Take breaks and walk about every few hours. Brisk walking is a good exercise. It helps circulate the blood that has collected in your leg. Talk with your provider about using support stockings to stop daytime leg swelling.  · If your provider says that heart disease is causing your leg swelling, follow a low-salt diet to stop extra  fluid from staying in your body. You may also need medicine.  Follow-up care  Follow up with your healthcare provider, or as advised.  When to seek medical advice  Call your healthcare provider right away if any of these occur:  · New shortness of breath or chest pain  · Shortness of breath or chest pain that gets worse  · Swelling in both legs or ankles that gets worse  · Swelling of the abdomen  · Redness, warmth, or swelling in one leg  · Fever of 100.4ºF (38ºC) or higher, or as directed by your healthcare provider  · Yellow color to your skin or eyes  · Rapid, unexplained weight gain  · Having to sleep upright or use an increased number of pillows  Date Last Reviewed: 3/31/2016  © 9908-0245 [employer]. [address] [place] [address], [place] [address]. All rights reserved. This information is not intended as a substitute for professional medical care. Always follow your healthcare professional's instructions.

## 2020-04-08 ENCOUNTER — DOCUMENT SCAN (OUTPATIENT)
Dept: HOME HEALTH SERVICES | Facility: HOSPITAL | Age: 85
End: 2020-04-08
Payer: MEDICARE

## 2020-05-01 ENCOUNTER — TELEPHONE (OUTPATIENT)
Dept: PRIMARY CARE CLINIC | Facility: CLINIC | Age: 85
End: 2020-05-01

## 2020-05-01 NOTE — TELEPHONE ENCOUNTER
----- Message from Amrita Alonso sent at 5/1/2020  1:32 PM CDT -----  Contact: Daughter Maris  Patients daughter states that her mothers Left leg and foot are swollen, and purplish blue, actually both are swollen but the other not color.  She needs home health ordered again and please call Maris back at [phone] to advise.  Thanks

## 2020-05-01 NOTE — TELEPHONE ENCOUNTER
"Spoke with Maris, who indicated that pt's left leg was swollen more than usual and that it had a bluish tint to it.  "Pt has a long HX of lower ext edema".  Denies warmth, weeping, wounds trauma or pain.  Maris stated that pt does not keep her legs elevated as directed.  She also would like to have pt re enrolled in  [facility], pt was D/C on 4/27, " goal achieved".  Asked Maris to attempt to get pt to elevate her legs more often as directed.  Monitor for  Fever, warmth, increased pain and to let us know.   "

## 2020-05-05 ENCOUNTER — PATIENT MESSAGE (OUTPATIENT)
Dept: ADMINISTRATIVE | Facility: HOSPITAL | Age: 85
End: 2020-05-05

## 2020-05-22 ENCOUNTER — CARE AT HOME (OUTPATIENT)
Dept: HOME HEALTH SERVICES | Facility: CLINIC | Age: 85
End: 2020-05-22
Payer: MEDICARE

## 2020-05-22 VITALS
DIASTOLIC BLOOD PRESSURE: 72 MMHG | RESPIRATION RATE: 16 BRPM | OXYGEN SATURATION: 96 % | TEMPERATURE: 99 F | SYSTOLIC BLOOD PRESSURE: 126 MMHG | HEART RATE: 92 BPM

## 2020-05-22 DIAGNOSIS — R53.81 DEBILITY: ICD-10-CM

## 2020-05-22 DIAGNOSIS — R60.0 BILATERAL LOWER EXTREMITY EDEMA: ICD-10-CM

## 2020-05-22 DIAGNOSIS — I50.32 CHRONIC DIASTOLIC CONGESTIVE HEART FAILURE: ICD-10-CM

## 2020-05-22 DIAGNOSIS — Z91.148 NONCOMPLIANCE WITH MEDICATION REGIMEN: ICD-10-CM

## 2020-05-22 DIAGNOSIS — Z09 FOLLOW UP: Primary | ICD-10-CM

## 2020-05-22 PROCEDURE — 1101F PT FALLS ASSESS-DOCD LE1/YR: CPT | Mod: CPTII,S$GLB,, | Performed by: NURSE PRACTITIONER

## 2020-05-22 PROCEDURE — 99348 HOME/RES VST EST LOW MDM 30: CPT | Mod: S$GLB,,, | Performed by: NURSE PRACTITIONER

## 2020-05-22 PROCEDURE — 1126F PR PAIN SEVERITY QUANTIFIED, NO PAIN PRESENT: ICD-10-PCS | Mod: S$GLB,,, | Performed by: NURSE PRACTITIONER

## 2020-05-22 PROCEDURE — 99348 PR HOME VISIT,ESTAB PATIENT,LEVEL II: ICD-10-PCS | Mod: S$GLB,,, | Performed by: NURSE PRACTITIONER

## 2020-05-22 PROCEDURE — 1101F PR PT FALLS ASSESS DOC 0-1 FALLS W/OUT INJ PAST YR: ICD-10-PCS | Mod: CPTII,S$GLB,, | Performed by: NURSE PRACTITIONER

## 2020-05-22 PROCEDURE — 1159F MED LIST DOCD IN RCRD: CPT | Mod: S$GLB,,, | Performed by: NURSE PRACTITIONER

## 2020-05-22 PROCEDURE — 1126F AMNT PAIN NOTED NONE PRSNT: CPT | Mod: S$GLB,,, | Performed by: NURSE PRACTITIONER

## 2020-05-22 PROCEDURE — 1159F PR MEDICATION LIST DOCUMENTED IN MEDICAL RECORD: ICD-10-PCS | Mod: S$GLB,,, | Performed by: NURSE PRACTITIONER

## 2020-05-22 NOTE — PROGRESS NOTES
"[facility]  Medical Home Visit    Visit Date: 5/22/2020  Encounter Provider: Isabel Quintero NP  PCP:  Rachel Millard MD    Subjective:      Patient ID: Fracisco Pires is a 89 y.o. female.    Consult Requested By: Rachel Millard MD  Reason for Consult:  Medical Follow Up    Fracisco is being seen at home due to physical debility that presents a taxing effort to leave the home, to mitigate high risk of hospital readmission and/or due to the limited availability of reliable or safe options for transportation to the point of access to the provider. Prior to treatment on this visit the chart was reviewed and patient consent was obtained.      Chief Complaint: Follow-up, Non-compliance with medication regimen, BLE edema    Fracisco Pires is a 89 y.o. female.  with hx of osteoarthritis of both knees s/p TKR, debility, homebound status, obesity, HTN, HLD, NSTEMI, hx of mutliple hospitalizations for sepsis due to UTI and cellulitis, chronic diastolic dysufnction, interstital fibrosis.     Fracisco is seen today in her home for a medical follow up. She is AAO x 3 today and reports that she continues to have swelling in her legs with with the left leg being more problematic than the right leg. She reports that her legs "ache" sometimes because of the swelling. When asked if she watches her salt intake and elevated her legs while seated she stated "no".     Patient is homebound and reports that she has not left home in over a year. She lives with her spouse but he does not assist in her care. Patient was unable to locate her 2 medication pill bottles today initially. Once found, it appears Vitamin D is being taken as directed however there are more Lasix tablets than there should be if she were taking them as directed. When asked about it she stated "those pills make me pee too much". Her neighbor Ary is present and states that is why she probably isn't taking them as prescribed. I placed a Lasix tablet in " "front of patient with water and asked her to take this. I had to ask her 3 times to take it and she finally did.  Medication compliance has been an issue in the past with Fracisco.      Education provided on Vitamin D deficiency and importance of supplementation and CHF, leg swelling- follow low salt diet and take Lasix as prescribed. Medication pill box filled for patient today.      Her gait is unsteady and she uses a rollator for ambulation assistance. No recent falls have been reported.     Appetite is reported to be "good". She heats up meals in the microwave for herself and her neighbor Ary comes daily to tidy up the home and make sure patient is eating daily.     VSS. Denies CP/SOB, N/V/D. Denies any acute issues, concerns or complaints to address on today's visit. Reports taking all medications as prescribed (not evident with Lasix prescription). No other needs identified at this time. Risks of environmental exposure to coronavirus discussed including: social distancing, hand hygiene, and limiting departures from the home for necessities only.  Reports understanding and willingness to comply.       Review of Systems   Constitutional: Negative.    HENT: Positive for hearing loss. Negative for trouble swallowing.    Eyes: Right eye stye developed over the last few days, reports that it is getting smaller.  Respiratory: Positive for mild shortness of breath (with exertion) and wheezing (mild and with exertion). Negative for cough.    Cardiac: Negative.   Gastrointestinal: Negative.    Endocrine: Negative.    Genitourinary: Negative.    Musculoskeletal: Positive for gait problem (unsteady).  Positive for swelling in legs.  Skin: Negative.    Allergic/Immunologic: Negative.    Hematological: Negative.    Psychiatric/Behavioral: Positive for decreased concentration (at times). Negative for agitation. The patient is not nervous/anxious.          Assessments:               Environmental: safe, slightly cluttered, 2 " medium sized dogs roaming the home, protective of patient               Functional Status: mostly independent, requires assistance with medication planning, neighbor Ary comes over daily to assist with housekeeping and some meal prep.                Safety: unsteady gait at times, dogs inside the home, leg swelling               Nutritional: adequate               Home Health/DME/Supplies: rollator     Objective:      Physical Exam   Constitutional: She is oriented to person, place, and time. She appears well-developed and well-nourished.   HENT:   Head: Normocephalic and atraumatic.   Eyes: Pupils are equal, round, and reactive to light. Right lower eyelid with small stye, no edema, mild erythema surrounding stye, no tenderness reported.  Neck: Normal range of motion. Neck supple.   Cardiovascular: Normal rate, regular rhythm, normal heart sounds and intact distal pulses.   No murmur heard.  Pulmonary/Chest: Effort normal. She has wheezes (mild and with exertion). Moderate dyspnea with standing to ambulate. Moderate-to-severe dyspnea with ambulating approximately 50 feet.  Abdominal: Soft. Bowel sounds are normal.   Musculoskeletal: She exhibits edema (2+ pitting edema to tops of both feet and ankles Left > Right, no blisters noted).  Neurological: She is alert and oriented to person, place, and time.   Skin: Skin is warm and dry. Capillary refill takes 2 to 3 seconds. No erythema. Scattered ecchymosis and purpura to BUE. Skin intact.  Psychiatric: She has a normal mood and affect. Her behavior is normal.   Forgetful at times.    Vitals:    05/22/20 1108   BP: 126/72   Pulse: 92   Resp: 16   Temp: 98.6 °F (37 °C)   TempSrc: Temporal   SpO2: 96%   PainSc: 0-No pain     There is no height or weight on file to calculate BMI.    Assessment:     1. Follow up    2. Bilateral lower extremity edema    3. Chronic diastolic congestive heart failure    4. Debility    5. Noncompliance with medication regimen        Plan:  "    Ethical / Legal: Advance Care Planning   Surrogate decision maker:  Name Maris Pires, Relationship: daughter  Code Status:  DNR  [place]:  On file  Other advance directive:  none, Capacity to make medical decisions:  intact, Conflict non identified      Fracisco was seen today for follow-up.    Diagnoses and all orders for this visit:    Follow up    Bilateral lower extremity edema  -     Ambulatory referral/consult to [facility]; Future  Encouraged patient to elevate feet while seated and follow low salt ADA. States "I will try".  Chronic diastolic congestive heart failure  -     Ambulatory referral/consult to [facility]; Future    Debility  -     Ambulatory referral/consult to [facility]; Future  Fall precautions and safety advised.  Noncompliance with medication regimen  -     Ambulatory referral/consult to [facility]; Future  Filled patient pill box for her today. Her neighbor Ary is present and was instructed filling medication box for patient and encouraging her to take her medications as directed. Offered to order Home Health today.Patient does not currently want Home Health SN[facility] to help with medications.     Limit Risks of environmental exposure to coronavirus/COVID-19 as discussed including: social distancing, hand hygiene, and limiting departures from the home for necessities only.     Time allowed for questions, all questions answered. [facility] contact information left with patient today for any future concerns.      Were controlled substances prescribed?  No    Follow Up Appointments:   No future appointments.    Verbal consent for visit obtained from patient today.    Signature:  Isabel Quintero NP     Attestation:   Screening criteria to assess the level of the patient's risk for infection with COVID-19 as recommended by the CDC at the time of the above " documented home visit concluded appropriateness to proceed. Universal precautions were maintained at all times, including provider wearing a mask and gloves during entire visit and use of 60% alcohol gel hand  immediately prior to entry and upon departure of patient's home as well as cleaning of equipment used in home visit with antibacterial/germicidal disposable wipes.

## 2020-05-24 PROBLEM — Z09 FOLLOW UP: Status: ACTIVE | Noted: 2020-05-24

## 2020-05-24 PROBLEM — I10 ESSENTIAL HYPERTENSION: Status: ACTIVE | Noted: 2020-05-24

## 2020-05-25 NOTE — PATIENT INSTRUCTIONS
Leg Swelling in Both Legs    Swelling of the feet, ankles, and legs is called edema. It is caused by excess fluid that has collected in the tissues. Extra fluid in the body settles in the lowest part because of gravity. This is why the legs and feet are most affected.  Some of the causes for edema include:  · Disease of the heart like congestive heart failure  · Standing or sitting for long periods of time  · Infection of the feet or legs  · Blood pooling in the veins of your legs (venous insufficiency)  · Dilated veins in your lower leg (varicose veins)  · Garters or other clothing that is tight on your legs. This will cause blood to pool in your legs because the clothing limits blood flow.  · Some medicines such as hormones like birth control pills, some blood pressure medicines like calcium channel blockers (amlodipine) and steroids, some antidepressants like MAO inhibitors and tricyclics  · Menstrual periods that cause you to retain fluids  · Many types of renal disease  · Liver failure or cirrhosis  · Pregnancy, some swelling is normal, but a sudden increase in leg swelling or weight gain can be a sign of a dangerous complication of pregnancy  · Poor nutrition  · Thyroid disease  Medical treatment will depend on what is causing the swelling in your legs. Your healthcare provider may prescribe water pills (diuretics) to get rid of the extra fluid.  Home care  Follow these guidelines when caring for yourself at home:  · Don't wear clothing like garters that is tight on your legs.  · Keep your legs up while lying or sitting.  · If infection, injury, or recent surgery is causing the swelling, stay off your legs as much as possible until symptoms get better.  · If your healthcare provider says that your leg swelling is caused by venous insufficiency or varicose veins, don't sit or  one place for long periods of time. Take breaks and walk about every few hours. Brisk walking is a good exercise. It helps  circulate the blood that has collected in your leg. Talk with your provider about using support stockings to stop daytime leg swelling.  · If your provider says that heart disease is causing your leg swelling, follow a low-salt diet to stop extra fluid from staying in your body. You may also need medicine.  Follow-up care  Follow up with your healthcare provider, or as advised.  When to seek medical advice  Call your healthcare provider right away if any of these occur:  · New shortness of breath or chest pain  · Shortness of breath or chest pain that gets worse  · Swelling in both legs or ankles that gets worse  · Swelling of the abdomen  · Redness, warmth, or swelling in one leg  · Fever of 100.4ºF (38ºC) or higher, or as directed by your healthcare provider  · Yellow color to your skin or eyes  · Rapid, unexplained weight gain  · Having to sleep upright or use an increased number of pillows  Date Last Reviewed: 3/31/2016  © 6222-0228 [employer]. [address] [place] [address], [place] [address]. All rights reserved. This information is not intended as a substitute for professional medical care. Always follow your healthcare professional's instructions.        Low-Salt Diet  This diet removes foods that are high in salt. It also limits the amount of salt you use when cooking. It is most often used for people with high blood pressure, edema (fluid retention), and kidney, liver, or heart disease.  Table salt contains the mineral sodium. Your body needs sodium to work normally. But too much sodium can make your health problems worse. Your healthcare provider is recommending a low-salt (also called low-sodium) diet for you. Your total daily allowance of salt is 1,500 to 2,300 milligrams (mg). It is less than 1 teaspoon of table salt. This means you can have only about 500 to 700 mg of sodium at each meal. People with certain health problems should limit salt intake to the lower end of the recommended range.     When you cook, dont add much salt. If you can cook without using salt, even better. Dont add salt to your food at the table.  When shopping, read food labels. Salt is often called sodium on the label. Choose foods that are salt-free, low salt, or very low salt. Note that foods with reduced salt may not lower your salt intake enough.    Beans, potatoes, and pasta  Ok: Dry beans, split peas, lentils, potatoes, rice, macaroni, pasta, spaghetti without added salt  Avoid: Potato chips, tortilla chips, and similar products  Breads and cereals  Ok: Low-sodium breads, rolls, cereals, and cakes; low-salt crackers, matzo crackers  Avoid: Salted crackers, pretzels, popcorn, [place] toast, pancakes, muffins  Dairy  Ok: Milk, chocolate milk, hot chocolate mix, low-salt cheeses, and yogurt  Avoid: Processed cheese and cheese spreads; Roquefort, Camembert, and cottage cheese; buttermilk, instant breakfast drink  Desserts  Ok: Ice cream, frozen yogurt, juice bars, gelatin, cookies and pies, sugar, honey, jelly, hard candy  Avoid: Most pies, cakes and cookies prepared or processed with salt; instant pudding  Drinks  Ok: Tea, coffee, fizzy (carbonated) drinks, juices  Avoid: Flavored coffees, electrolyte replacement drinks, sports drinks  Meats  Ok: All fresh meat, fish, poultry, low-salt tuna, eggs, egg substitute  Avoid: Smoked, pickled, brine-cured, or salted meats and fish. This includes pacheco, chipped beef, corned beef, hot dogs, deli meats, ham, kosher meats, salt pork, sausage, canned tuna, salted codfish, smoked salmon, herring, sardines, or anchovies.  Seasonings and spices  Ok: Most seasonings are okay. Good substitutes for salt include: fresh herb blends, hot sauce, lemon, garlic, mcgraw, vinegar, dry mustard, parsley, cilantro, horseradish, tomato paste, regular margarine, mayonnaise, unsalted butter, cream cheese, vegetable oil, cream, low-salt salad dressing and gravy.  Avoid: Regular ketchup, relishes, pickles, soy  sauce, teriyaki sauce, [facility] sauce, BBQ sauce, tartar sauce, meat tenderizer, chili sauce, regular gravy, regular salad dressing, salted butter  Soups  Ok: Low-salt soups and broths made with allowed foods  Avoid: Bouillon cubes, soups with smoked or salted meats, regular soup and broth  Vegetables  Ok: Most vegetables are okay; also low-salt tomato and vegetable juices  Avoid: Sauerkraut and other brine-soaked vegetables; pickles and other pickled vegetables; tomato juice, olives  Date Last Reviewed: 8/1/2016  © 8045-9681 [employer]. [address] [place] [address], [place] [address]. All rights reserved. This information is not intended as a substitute for professional medical care. Always follow your healthcare professional's instructions.

## 2020-06-02 ENCOUNTER — TELEPHONE (OUTPATIENT)
Dept: PRIMARY CARE CLINIC | Facility: CLINIC | Age: 85
End: 2020-06-02

## 2020-06-02 NOTE — TELEPHONE ENCOUNTER
----- Message from Rachel Millard MD sent at 6/2/2020 11:34 AM CDT -----  Please schedule pt for home visit in August 27th in the afternoon.     Thank you

## 2020-06-02 NOTE — TELEPHONE ENCOUNTER
Spoke with neighbor Maris and she states that she does not think she can wait until August to be seen.  Patient legs are swollen and purple.   Home visit scheduled for 6/4/2020 at 1:30 pm.

## 2020-06-04 ENCOUNTER — EXTERNAL VISIT (OUTPATIENT)
Dept: PRIMARY CARE CLINIC | Facility: CLINIC | Age: 85
End: 2020-06-04
Payer: MEDICARE

## 2020-06-04 DIAGNOSIS — Z71.89 ADVANCED CARE PLANNING/COUNSELING DISCUSSION: ICD-10-CM

## 2020-06-04 DIAGNOSIS — R60.0 BILATERAL LOWER EXTREMITY EDEMA: ICD-10-CM

## 2020-06-04 DIAGNOSIS — M17.0 PRIMARY OSTEOARTHRITIS OF BOTH KNEES: ICD-10-CM

## 2020-06-04 PROCEDURE — 99215 PR OFFICE/OUTPT VISIT, EST, LEVL V, 40-54 MIN: ICD-10-PCS | Mod: S$GLB,,, | Performed by: INTERNAL MEDICINE

## 2020-06-04 PROCEDURE — 99215 OFFICE O/P EST HI 40 MIN: CPT | Mod: S$GLB,,, | Performed by: INTERNAL MEDICINE

## 2020-06-04 NOTE — PROGRESS NOTES
Primary Care Provider Appointment- HOME VISIT    Subjective:      Patient ID: Irdenzel Pires is a 89 y.o. female. female osteoarthritis of both knees s/p TKR, debility, homebound status, obesity, HTN, HLD, NSTEMI, hx of [facility] hospitalizations for sepsis due to UTI and cellulitis, chronic diastolic dysufnction, interstital fibrosis    Chief Complaint: Leg Swelling    Seen at home due to homebound status   Her neighbor was concerned due to pt's significant left leg swelling, worsened since pt last home visit with Isabel.     Pt admits to not taking lasix as directed because it makes her urinate.   She does have pain in her knees with ambulation but not with sitting.   Pt is mostly sedentary per neighbor.   Neighbor is feeding her low sodium diet   No falls, no recent trauma       Using rollating walker in the home         Past Surgical History:   Procedure Laterality Date    CATARACT EXTRACTION      os d 11/6/13// od d 4/16/14//    EYE SURGERY      both eyes    JOINT REPLACEMENT      bilateral knee replacement       Past Medical History:   Diagnosis Date    Arthritis     Asthma     recent bronchits treated and resoved with Levaquin. Last Nov 2011.    Cataract     ou done//    Diabetes mellitus     diet controlled    GERD (gastroesophageal reflux disease)     Hypertension     Phlebitis and thrombophlebitis of the leg 2005 before    Skin lesion of hand        Review of Systems   Constitutional: Negative for activity change, fatigue and fever.   HENT: Negative for congestion.    Respiratory: Negative for chest tightness and shortness of breath.    Cardiovascular: Negative for chest pain.   Gastrointestinal: Negative for abdominal pain.   Genitourinary: Negative for difficulty urinating.   Musculoskeletal: Positive for arthralgias.   Neurological: Negative for dizziness.   Psychiatric/Behavioral: Negative for sleep disturbance.       Objective:       Physical Exam   Constitutional: She is oriented to  person, place, and time. She appears well-developed and well-nourished.   HENT:   Head: Normocephalic.   Eyes: Conjunctivae and EOM are normal.   Cardiovascular:   Irregular rate, regular rhythm    Pulmonary/Chest: Effort normal and breath sounds normal. No respiratory distress.   Abdominal: Soft. Bowel sounds are normal. She exhibits no distension. There is no tenderness.   Musculoskeletal: She exhibits deformity.   Bilateral knee crepitus, soft tissue swelling noted in both knees, mild warmth in left knee compared to right. No erythema no effusion noted    Neurological: She is alert and oriented to person, place, and time.   Skin:   Left leg with 3-4+ pitting edema from lower leg to foot.   Right leg with 2+ pitting edema  From lower leg to foot.   Purplish/red discoloration of both feet with rough, scaly changes to skin     No lesions or ulcers noted on feet or legs.   No warmth on palpation or tenderness with palpation of legs    Psychiatric: She has a normal mood and affect.       Lab Results   Component Value Date    WBC 4.78 03/04/2020    HGB 14.3 03/04/2020    HCT 45.6 03/04/2020     03/04/2020    CHOL 140 11/27/2014    TRIG 48 11/27/2014    HDL 55 11/27/2014    ALT 12 03/04/2020    AST 29 03/04/2020     03/04/2020    K 4.2 03/04/2020     03/04/2020    CREATININE 0.8 03/04/2020    BUN 11 03/04/2020    CO2 25 03/04/2020    TSH 0.467 11/27/2014    HGBA1C 6.1 01/20/2016         Assessment:   89 y.o. female with multiple co-morbid illnesses here to continue work-up of chronic issues notably emale osteoarthritis of both knees s/p TKR, debility, homebound status, obesity, HTN, HLD, NSTEMI, hx of mulitple hospitalizations for sepsis due to UTI and cellulitis, chronic diastolic dysufnction, interstital fibrosis      Plan:     Problem List Items Addressed This Visit        Orthopedic    Primary osteoarthritis of both knees     Take extra strength tylenol as needed for pain             Palliative  Care    Advanced care planning/counseling discussion     Discussed with neighbor that family needs to decide on goals of care for parents and mother with her swelling and legs.   Will reach out to family this week to discuss options             Other    Bilateral lower extremity edema     Worse in left leg, no concern for dvt or cellulitis.   Instructed to take 40 mg of lasix for 3 days and monitor for improvement.   If not improvement, can administer lasix injection to try to help patient but discussed with patient and neighbor that if pt does not take lasix as directed, swelling will come back.     Advised pt that she is at risk for devloping blisters, cellulitis or even sepsis if she does not improve her swelling.                Health Maintenance       Date Due Completion Date    TETANUS VACCINE 03/08/1947 ---    Aspirin/Antiplatelet Therapy 03/08/1947 ---    Shingles Vaccine (1 of 2) 03/08/1979 ---    Lipid Panel 11/27/2019 11/27/2014          No follow-ups on file. . Total face-to-face time was 60 min, greater than 50% of this was spent on counseling and coordination of care.      Rachel Millard MD  Internal Medicine- Geriatrics  [facility]

## 2020-06-08 ENCOUNTER — TELEPHONE (OUTPATIENT)
Dept: PRIMARY CARE CLINIC | Facility: CLINIC | Age: 85
End: 2020-06-08

## 2020-06-08 VITALS
DIASTOLIC BLOOD PRESSURE: 70 MMHG | RESPIRATION RATE: 12 BRPM | OXYGEN SATURATION: 92 % | HEART RATE: 60 BPM | SYSTOLIC BLOOD PRESSURE: 140 MMHG

## 2020-06-08 NOTE — ASSESSMENT & PLAN NOTE
Worse in left leg, no concern for dvt or cellulitis.   Instructed to take 40 mg of lasix for 3 days and monitor for improvement.   If not improvement, can administer lasix injection to try to help patient but discussed with patient and neighbor that if pt does not take lasix as directed, swelling will come back.     Advised pt that she is at risk for devloping blisters, cellulitis or even sepsis if she does not improve her swelling.

## 2020-06-08 NOTE — ASSESSMENT & PLAN NOTE
Discussed with neighbor that family needs to decide on goals of care for parents and mother with her swelling and legs.   Will reach out to family this week to discuss options

## 2020-06-08 NOTE — TELEPHONE ENCOUNTER
----- Message from Rachel Millard MD sent at 6/8/2020 10:10 AM CDT -----  Please call and see if patient legs have improved, she is supposed to have taken 2 of lasix daily for 3 days on Thursday, Friday and Saturday. She now should be back to just taking 1 tablet daily.     Please schedule follow up appt with me for sure on August 27th in the afternoon. Depending on her legs, she may need closer follow up with Isabel for an injection of lasix.     If her legs have not improved, ask her if she is willing to get an injection of lasix.

## 2020-06-08 NOTE — TELEPHONE ENCOUNTER
Spoke with daughter Maris he states that her mom refused to take two lasix daily for 3 days, she would only take one daily for the three days.  States that her legs are not worse, still about the same.      Home visit scheduled for 8/27/2020 at 2 pm.

## 2020-06-09 NOTE — TELEPHONE ENCOUNTER
Spoke with Maris, she and her father will try to crush pills for patient to see if this will help her swelling. Maris understands that her mother does not want to take medications and she understands and accepts her mother's way of thinking.

## 2020-06-10 DIAGNOSIS — M17.0 PRIMARY OSTEOARTHRITIS OF BOTH KNEES: Primary | ICD-10-CM

## 2020-06-10 DIAGNOSIS — R60.0 BILATERAL LOWER EXTREMITY EDEMA: ICD-10-CM

## 2020-06-10 DIAGNOSIS — Z09 FOLLOW UP: ICD-10-CM

## 2020-06-10 NOTE — PROGRESS NOTES
Will transition patient from [facility] to palliative service.     Rachel Millard MD  Internal Medicine- Geriatrics  [facility]

## 2020-06-15 ENCOUNTER — CARE AT HOME (OUTPATIENT)
Dept: HOME HEALTH SERVICES | Facility: CLINIC | Age: 85
End: 2020-06-15
Payer: MEDICARE

## 2020-06-15 DIAGNOSIS — Z51.5 PALLIATIVE CARE ENCOUNTER: ICD-10-CM

## 2020-06-15 DIAGNOSIS — I50.32 CHRONIC DIASTOLIC CONGESTIVE HEART FAILURE: Primary | Chronic | ICD-10-CM

## 2020-06-15 DIAGNOSIS — N18.30 CKD (CHRONIC KIDNEY DISEASE) STAGE 3, GFR 30-59 ML/MIN: ICD-10-CM

## 2020-06-15 DIAGNOSIS — D69.2 SENILE PURPURA: ICD-10-CM

## 2020-06-15 DIAGNOSIS — I10 ESSENTIAL HYPERTENSION: ICD-10-CM

## 2020-06-15 DIAGNOSIS — R53.81 DEBILITY: ICD-10-CM

## 2020-06-15 DIAGNOSIS — R60.0 BILATERAL LOWER EXTREMITY EDEMA: ICD-10-CM

## 2020-06-15 DIAGNOSIS — E55.9 VITAMIN D DEFICIENCY: ICD-10-CM

## 2020-06-15 DIAGNOSIS — H90.3 SENSORINEURAL HEARING LOSS (SNHL) OF BOTH EARS: ICD-10-CM

## 2020-06-15 DIAGNOSIS — G31.84 MILD COGNITIVE IMPAIRMENT: ICD-10-CM

## 2020-06-15 DIAGNOSIS — M17.0 PRIMARY OSTEOARTHRITIS OF BOTH KNEES: ICD-10-CM

## 2020-06-15 DIAGNOSIS — Z91.148 NONCOMPLIANCE WITH MEDICATION REGIMEN: ICD-10-CM

## 2020-06-15 PROCEDURE — 1159F MED LIST DOCD IN RCRD: CPT | Mod: S$GLB,,, | Performed by: NURSE PRACTITIONER

## 2020-06-15 PROCEDURE — 1101F PT FALLS ASSESS-DOCD LE1/YR: CPT | Mod: CPTII,S$GLB,, | Performed by: NURSE PRACTITIONER

## 2020-06-15 PROCEDURE — 1126F AMNT PAIN NOTED NONE PRSNT: CPT | Mod: S$GLB,,, | Performed by: NURSE PRACTITIONER

## 2020-06-15 PROCEDURE — 1159F PR MEDICATION LIST DOCUMENTED IN MEDICAL RECORD: ICD-10-PCS | Mod: S$GLB,,, | Performed by: NURSE PRACTITIONER

## 2020-06-15 PROCEDURE — 99350 PR HOME VISIT,ESTAB PATIENT,LEVEL IV: ICD-10-PCS | Mod: S$GLB,,, | Performed by: NURSE PRACTITIONER

## 2020-06-15 PROCEDURE — 99350 HOME/RES VST EST HIGH MDM 60: CPT | Mod: S$GLB,,, | Performed by: NURSE PRACTITIONER

## 2020-06-15 PROCEDURE — 1101F PR PT FALLS ASSESS DOC 0-1 FALLS W/OUT INJ PAST YR: ICD-10-PCS | Mod: CPTII,S$GLB,, | Performed by: NURSE PRACTITIONER

## 2020-06-15 PROCEDURE — 1126F PR PAIN SEVERITY QUANTIFIED, NO PAIN PRESENT: ICD-10-PCS | Mod: S$GLB,,, | Performed by: NURSE PRACTITIONER

## 2020-06-15 NOTE — PROGRESS NOTES
"[facility]  Palliative Care Home Visit    Visit Date: 6/15/2020  Encounter Provider: Isabel Quintero NP  PCP:  Rachel iMllard MD    Subjective:      Patient ID: Fracisco Pires is a 89 y.o. female.    Consult Requested By:  Dr. Rachel Millard  Reason for Consult:  Palliative Care  Visit time: 12:20 pm - 1:30 pm  Chief Complaint: Establish Care    Fracisco Pires is a 89 y.o. female.  with hx of osteoarthritis of both knees s/p TKR, debility, homebound status, obesity, HTN, HLD, NSTEMI, hx of mutliple hospitalizations for sepsis due to UTI and cellulitis, chronic diastolic dysufnction, interstital fibrosis.     Fracisco is being seen in her home today to establish Palliative Care. She is found sitting at her kitchen table drinking coffee. She is AAO x 3 today and has no complaints. She have obvious continued swelling in both lower extremities for which Lasix is prescribed but she does not take regularly because it makes her have to urinate "too much" reportedly. She states that she takes it "sometimes". Her PCP and myself on previous medical home visits have encouraged her to take it regularly to help reduce the swelling and to prevent potential skin breakdown/uclerations from occurring. Patient has more Lasix tablets in her medication bottle than she should if she were taking it everyday. She is remains non-compliant with the Lasix. Vitamin D medication amount indicates compliance.    Patient is homebound and reports that she has not left home in over a year. She lives with her spouse Gabriele but he does not assist in her care.  She is fairly sedentary and ambulates around her home using a rollator. No recent falls or trauma reported.  Patient does report arthritis pain in both knees that is worse with walking so she sits mostly. Patient has neighbors, Ary and Ginger, that visit daily and help with IADLs and some ADLs as needed. Appetite is reported to be "good". She heats up meals in the microwave " for herself and her neighbor Ary comes daily to tidy up the home and make sure patient is eating daily. Patient's daughter Maris Pires is an only child and is active in her parents care despite living in [place]. Per conversation with her today, she has offered for her parents to come and live with her but they do not want to leave their home.     VSS. Denies CP/SOB, N/V/D. Denies any acute issues, concerns or complaints to address on today's visit. Reports taking all medications as prescribed (not evident with Lasix prescription). No other needs identified at this time. Risks of environmental exposure to coronavirus discussed including: social distancing, hand hygiene, and limiting departures from the home for necessities only.  Reports understanding and willingness to comply.       Goals of care discussed with Fracisco today. She voices and Maris confirms via phone that patient is DNR. She would like comfort care and wants to remain in her home and be as independent as possible. Advanced directives in place in [facility]. Hospice care discussed briefly but patient is not currently appropriate for Hospice care.       Patient Instructions:  - [facility] Nurse Practitioner to schedule home follow-up visit with patient in 4-6 weeks or as needed.  - Continue all medications, treatments and therapies as ordered.   - Follow all instructions, recommendations as discussed.  - Maintain Safety Precautions at all times.  - Attend all medical appointments as scheduled.  - For worsening symptoms: call Primary Care Physician or Nurse Practitioner.  - For emergencies, call 911 or immediately report to the nearest emergency room.  - Limit Risks of environmental exposure to coronavirus/COVID-19 as discussed including: social distancing, hand hygiene, and limiting departures from the home for necessities only.   -Elevate feet while seated. Please take Lasix as prescribed. Try to follow low sodium diet. Notify NP for any skin breakdown in  legs. (discussed with patient and Maris).    Review of Systems   Constitutional: Negative for activity change, fatigue and fever.   HENT: Negative for congestion.    Respiratory: Negative for chest tightness and shortness of breath.    Cardiovascular: Negative for chest pain.   Gastrointestinal: Negative for abdominal pain.   Genitourinary: Negative for difficulty urinating.   Musculoskeletal: Positive for arthralgias.   Neurological: Negative for dizziness.   Psychiatric/Behavioral: Negative for sleep disturbance.        Assessments:               Environmental: safe, slightly cluttered, 2 medium sized dogs roaming the home, protective of patient               Functional Status: mostly independent, requires assistance with medication planning, neighbor Ary comes over daily to assist with housekeeping and some meal prep.                Safety: unsteady gait at times, dogs inside the home, leg swelling               Nutritional: adequate               Home Health/DME/Supplies: No Home Health. DME: rollator    Symptom Assessment (ESAS 0-10 scale)     ESAS 0 1 2 3 4 5 6 7 8 9 10   Pain x             Dyspnea   x           Anxiety x             Nausea x             Depression  x             Anorexia x             Fatigue   x           Insomnia x             Restlessness  x             Agitation x               Constipation    no  Bowel Management Plan (BMP): no  Diarrhea        no  Comments: reports has BM qod    Performance Status: PPS Score 50  EGOC:  3    History:  Past Medical History:   Diagnosis Date    Arthritis     Asthma     recent bronchits treated and resoved with Levaquin. Last Nov 2011.    Cataract     ou done//    Diabetes mellitus     diet controlled    GERD (gastroesophageal reflux disease)     Hypertension     Phlebitis and thrombophlebitis of the leg 2005 before    Skin lesion of hand      Family History   Problem Relation Age of Onset    Stroke Mother     Heart disease Father     Amblyopia  Neg Hx     Blindness Neg Hx     Cancer Neg Hx     Cataracts Neg Hx     Diabetes Neg Hx     Glaucoma Neg Hx     Hypertension Neg Hx     Macular degeneration Neg Hx     Retinal detachment Neg Hx     Strabismus Neg Hx     Thyroid disease Neg Hx      Past Surgical History:   Procedure Laterality Date    CATARACT EXTRACTION      os d 11/6/13// od d 4/16/14//    EYE SURGERY      both eyes    JOINT REPLACEMENT      bilateral knee replacement     Review of patient's allergies indicates:   Allergen Reactions    No known drug allergies        Medications:    Current Outpatient Medications:     ergocalciferol (ERGOCALCIFEROL) 50,000 unit Cap, Take 1 capsule (50,000 Units total) by mouth every 7 days. For vitamin D, Disp: 12 capsule, Rfl: 0    furosemide (LASIX) 20 MG tablet, Take 1 tablet (20 mg total) by mouth once daily. For leg swelling, Disp: 90 tablet, Rfl: 3    24h Oral Morphine Equivalents (OME):  N/A    Objective:     Physical Exam:  Vitals:    06/15/20 1230   BP: (!) 140/68   Pulse: 90   Resp: 16   Temp: 97.1 °F (36.2 °C)   TempSrc: Temporal   SpO2: 96%   PainSc: 0-No pain     There is no height or weight on file to calculate BMI.    Physical Exam   Constitutional: She is oriented to person, place, and time. She appears well-developed and well-nourished.   HENT:   Head: Normocephalic.   Eyes: Conjunctivae and EOM are normal.   Cardiovascular:   Irregular rate, regular rhythm    Pulmonary/Chest: Effort normal and breath sounds normal. No respiratory distress.   Abdominal: Soft. Bowel sounds are normal. She exhibits no distension. There is no tenderness.   Musculoskeletal: She exhibits deformity.   Bilateral knee crepitus, soft tissue swelling noted in both knees, mild warmth in left knee compared to right. No erythema no effusion noted    Neurological: She is alert and oriented to person, place, and time. Has some intermittent mild confusion.  Skin:   Left leg with 3-4+ pitting edema from lower leg to  foot.   Right leg with 2+ pitting edema  From lower leg to foot.   Purplish/red discoloration of both feet with rough, scaly changes to skin   No lesions or ulcers noted on feet or legs.   No warmth on palpation or tenderness with palpation of legs    Scattered ecchymosis and purpura to BLE  Psychiatric: She has a normal mood and affect.          Labs:  CBC:   WBC   Date Value Ref Range Status   03/04/2020 4.78 3.90 - 12.70 K/uL Final       Hgb   Date Value Ref Range Status   08/02/2013 14.0 12.0 - 16.0 g/dl Final     Hemoglobin   Date Value Ref Range Status   03/04/2020 14.3 12.0 - 16.0 g/dL Final       Hematocrit   Date Value Ref Range Status   03/04/2020 45.6 37.0 - 48.5 % Final       Mean Corpuscular Volume   Date Value Ref Range Status   03/04/2020 103 (H) 82 - 98 fL Final       Platelets   Date Value Ref Range Status   03/04/2020 221 150 - 350 K/uL Final       LFT:   Lab Results   Component Value Date    AST 29 03/04/2020    ALKPHOS 75 03/04/2020    BILITOT 1.2 (H) 03/04/2020       Albumin:   Albumin   Date Value Ref Range Status   03/04/2020 3.0 (L) 3.5 - 5.2 g/dL Final     Protein:   Total Protein   Date Value Ref Range Status   03/04/2020 7.4 6.0 - 8.4 g/dL Final       Radiology:  I have reviewed all pertinent imaging results/findings within the past 24 hours.    Psychosocial/Cultural/Spiritual:   · F- [place] and Belief:  Caodaism   · I - Importance: very important  · C - Community: [facility]   A - Address in Care: Yazidi members visit weekly to give communion    Assessment:     1. Chronic diastolic congestive heart failure    2. Primary osteoarthritis of both knees    3. Palliative care encounter    4. Bilateral lower extremity edema    5. Debility    6. Noncompliance with medication regimen    7. Vitamin D deficiency    8. Mild cognitive impairment    9. CKD (chronic kidney disease) stage 3, GFR 30-59 ml/min    10. Essential hypertension    11. Sensorineural hearing loss (SNHL) of both ears    12.  Senile purpura        Plan:     Ethical / Legal: Advance Care Planning   · Surrogate decision maker:  Name Higinio Pires, Relationship: daughter  · Code Status:  DNR  · [place]:  [place] at present  · Other advance directive:  HPOA, Living Bhavesh, Capacity to make medical decisions:  impaired, Conflict none       Fracisco was seen today to establish Palliative Care.    Diagnoses and all orders for this visit:    Chronic diastolic congestive heart failure  -     Ambulatory referral/consult to [facility]; Future    Primary osteoarthritis of both knees  -     Ambulatory referral/consult to [facility]    Palliative care encounter    Bilateral lower extremity edema    Debility    Noncompliance with medication regimen    Vitamin D deficiency    Mild cognitive impairment    CKD (chronic kidney disease) stage 3, GFR 30-59 ml/min    Essential hypertension    Sensorineural hearing loss (SNHL) of both ears    Senile purpura      Limit Risks of environmental exposure to coronavirus/COVID-19 as discussed including: social distancing, hand hygiene, and limiting departures from the home for necessities only.     Time allowed for questions, all questions answered. [facility] contact information left with patient today for any future concerns.      Were controlled substances prescribed?  No    70 min visit spent in chart review, face to face discussion of goals of care,  symptom assessment, coordination of care and emotional support. 15 minute conversation with daughter Maris Pires via telephone discussing Palliative Care services, patient's condition and challenges and providing emotional support.    Follow Up Appointments:   Future Appointments   Date Time Provider Department Center   8/27/2020  2:00 PM [facility]   Verbal consent for visit obtained from patient and daughter Maris Pires via telephone today.    Signature:   Isabel Quintero NP     Attestation:   Screening criteria to assess the level of the patient's risk for infection with COVID-19 as recommended by the CDC at the time of the above documented home visit concluded appropriateness to proceed. Universal precautions were maintained at all times, including provider wearing a mask and gloves during entire visit and use of 60% alcohol gel hand  immediately prior to entry and upon departure of patient's home as well as cleaning of equipment used in home visit with antibacterial/germicidal disposable wipes.

## 2020-06-16 VITALS
DIASTOLIC BLOOD PRESSURE: 68 MMHG | RESPIRATION RATE: 16 BRPM | HEART RATE: 90 BPM | OXYGEN SATURATION: 96 % | SYSTOLIC BLOOD PRESSURE: 140 MMHG | TEMPERATURE: 97 F

## 2020-06-16 PROBLEM — Z51.5 PALLIATIVE CARE ENCOUNTER: Status: ACTIVE | Noted: 2020-06-16

## 2020-07-03 ENCOUNTER — NURSE TRIAGE (OUTPATIENT)
Dept: ADMINISTRATIVE | Facility: CLINIC | Age: 85
End: 2020-07-03

## 2020-07-04 NOTE — TELEPHONE ENCOUNTER
Pt's daughter (Maris) has already spoken to Isabel Quintero NP, in regards to pt's complaint of leg swelling; denies any further needs    Reason for Disposition   Caller has already spoken with the PCP and has no further questions.     Pt's daughter has already spoken with Isabel Quintero NP at pt's complaint; denies any further needs    Protocols used: ST NO CONTACT OR DUPLICATE CONTACT CALL-A-AH

## 2020-07-20 ENCOUNTER — CARE AT HOME (OUTPATIENT)
Dept: HOME HEALTH SERVICES | Facility: CLINIC | Age: 85
End: 2020-07-20
Payer: MEDICARE

## 2020-07-20 VITALS
DIASTOLIC BLOOD PRESSURE: 70 MMHG | OXYGEN SATURATION: 95 % | SYSTOLIC BLOOD PRESSURE: 112 MMHG | BODY MASS INDEX: 37.12 KG/M2 | RESPIRATION RATE: 16 BRPM | WEIGHT: 183.81 LBS | TEMPERATURE: 99 F | HEART RATE: 90 BPM

## 2020-07-20 DIAGNOSIS — R60.0 BILATERAL LOWER EXTREMITY EDEMA: ICD-10-CM

## 2020-07-20 DIAGNOSIS — H90.3 SENSORINEURAL HEARING LOSS (SNHL) OF BOTH EARS: ICD-10-CM

## 2020-07-20 DIAGNOSIS — D69.2 SENILE PURPURA: ICD-10-CM

## 2020-07-20 DIAGNOSIS — Z51.5 PALLIATIVE CARE ENCOUNTER: ICD-10-CM

## 2020-07-20 DIAGNOSIS — I50.32 CHRONIC DIASTOLIC CONGESTIVE HEART FAILURE: Chronic | ICD-10-CM

## 2020-07-20 DIAGNOSIS — I10 ESSENTIAL HYPERTENSION: ICD-10-CM

## 2020-07-20 DIAGNOSIS — Z91.148 NONCOMPLIANCE WITH MEDICATION REGIMEN: ICD-10-CM

## 2020-07-20 DIAGNOSIS — E55.9 VITAMIN D DEFICIENCY: ICD-10-CM

## 2020-07-20 DIAGNOSIS — M17.0 PRIMARY OSTEOARTHRITIS OF BOTH KNEES: Primary | ICD-10-CM

## 2020-07-20 DIAGNOSIS — G31.84 MILD COGNITIVE IMPAIRMENT: ICD-10-CM

## 2020-07-20 DIAGNOSIS — R53.81 DEBILITY: ICD-10-CM

## 2020-07-20 DIAGNOSIS — N18.30 CKD (CHRONIC KIDNEY DISEASE) STAGE 3, GFR 30-59 ML/MIN: ICD-10-CM

## 2020-07-20 PROCEDURE — 1159F PR MEDICATION LIST DOCUMENTED IN MEDICAL RECORD: ICD-10-PCS | Mod: S$GLB,,, | Performed by: NURSE PRACTITIONER

## 2020-07-20 PROCEDURE — 1126F AMNT PAIN NOTED NONE PRSNT: CPT | Mod: S$GLB,,, | Performed by: NURSE PRACTITIONER

## 2020-07-20 PROCEDURE — 99350 HOME/RES VST EST HIGH MDM 60: CPT | Mod: S$GLB,,, | Performed by: NURSE PRACTITIONER

## 2020-07-20 PROCEDURE — 1101F PT FALLS ASSESS-DOCD LE1/YR: CPT | Mod: CPTII,S$GLB,, | Performed by: NURSE PRACTITIONER

## 2020-07-20 PROCEDURE — 1126F PR PAIN SEVERITY QUANTIFIED, NO PAIN PRESENT: ICD-10-PCS | Mod: S$GLB,,, | Performed by: NURSE PRACTITIONER

## 2020-07-20 PROCEDURE — 1159F MED LIST DOCD IN RCRD: CPT | Mod: S$GLB,,, | Performed by: NURSE PRACTITIONER

## 2020-07-20 PROCEDURE — 99350 PR HOME VISIT,ESTAB PATIENT,LEVEL IV: ICD-10-PCS | Mod: S$GLB,,, | Performed by: NURSE PRACTITIONER

## 2020-07-20 PROCEDURE — 1101F PR PT FALLS ASSESS DOC 0-1 FALLS W/OUT INJ PAST YR: ICD-10-PCS | Mod: CPTII,S$GLB,, | Performed by: NURSE PRACTITIONER

## 2020-07-20 NOTE — Clinical Note
Patient's legs are significantly improved on visit 7/20/2020. She has a new sitter that comes [facility] and makes sure she is taking Lasix on those days at least.   I was very happy to see the improvement.   Of course I don't believe she is taking the Lasix on the days that the sitter is not there.    Thanks.  Isabel

## 2020-07-20 NOTE — PROGRESS NOTES
"[facility]  Palliative Care Home Visit    Visit Date: 7/20/2020  Encounter Provider: Isabel Quintero NP  PCP:  Rachel Millard MD    Subjective:      Patient ID: Fracisco Pires is a 89 y.o. female.    Consult Requested By:  Isabel Quintero NP  Reason for Consult:  Palliative Care  Visit time: 1:00 pm - 2:00 pm    Chief Complaint: Follow-up, leg edema, medication compliance    Fracisco Pires is a 89 y.o. female.  with hx of osteoarthritis of both knees s/p TKR, debility, homebound status, obesity, HTN, HLD, NSTEMI, hx of mutliple hospitalizations for sepsis due to UTI and cellulitis, chronic diastolic dysufnction, interstital fibrosis.     Fracisco is being seen in her home today for a Palliative Care follow up visit. She is found sitting at her kitchen table drinking coffee and eating a turkey sandwich. She is AAO x 3 today and has no complaints. Her BLE edema that is usually present is improved today. There is a new sitter present, Marisol, who reports that when she is working with patient on Monday, Wednesday and Thursday she makes sure that April takes her Lasix. She is pretty sure patient does not take it on other days when she isn't present. With 3 days of taking Lasix there is a definite improvement in patient's edema. Patient was encouraged to take Lasix on all other day and she agrees but not sure she will as she has not done so in the past. She has reported in the past that the Lasix "makes me pee too much". She continues to eat whatever she wants and reports she will not eat a diet with no salt.    On 7/3/2020 I received a call from patient's daughter Maris that her mother's legs were "weeping a pinkish fluid" but had no blisters present. She was advised to have her mother elevate her feet, take Lasix as prescribed and to follow a low salt diet. Legs are improved today and skin is intact without any weeping.     Patient is homebound and reports that she has not left home in over a year. She " "lives with her spouse Gabriele but he does not assist in her care.  She is fairly sedentary and ambulates around her home using a rollator. No recent falls or trauma reported.  Patient does report arthritis pain in both knees that is worse with walking so she sits mostly. Patient has neighbors, Ary and Ginger, that visit daily and help with IADLs and some ADLs as needed. Appetite is reported to be "good". She heats up meals in the microwave for herself and her neighbor Ary comes daily to tidy up the home and make sure patient is eating daily. Patient's daughter Maris Pires is an only child and is active in her parents care despite living in [place]. Per conversation with her in the past, she has offered for her parents to come and live with her but they do not want to leave their home.     VSS. Denies CP/SOB, N/V/D. Denies any acute issues, concerns or complaints to address on today's visit. Reports taking all medications as prescribed (not evident with Lasix prescription). No other needs identified at this time. Risks of environmental exposure to coronavirus discussed including: social distancing, hand hygiene, and limiting departures from the home for necessities only.  Reports understanding and willingness to comply.       Goals of care discussed with Fracisco today. She voices and Maris has confirmed via phone that patient is DNR in the past. She would like comfort care and wants to remain in her home and be as independent as possible. Advanced directives in place in [facility]. Hospice care discussed briefly but patient is not currently appropriate for Hospice care.         Patient Instructions:  - [facility] Practitioner to schedule home follow-up visit with patient in 4-6 weeks or as needed.  - Continue all medications, treatments and therapies as ordered.   - Follow all instructions, recommendations as discussed.  - Maintain Safety Precautions at all times.  - Attend all medical appointments as scheduled.  - " "For worsening symptoms: call Primary Care Physician or Nurse Practitioner.  - For emergencies, call 911 or immediately report to the nearest emergency room.  - Limit Risks of environmental exposure to coronavirus/COVID-19 as discussed including: social distancing, hand hygiene, and limiting departures from the home for necessities only.   -Elevate feet while seated. Please take Lasix as prescribed. Try to follow low sodium diet. Notify NP for any skin breakdown in legs. (discussed with patient and sitter Marisol)     Review of Systems   Constitutional: Negative for activity change, fatigue and fever.   HENT: Negative for congestion.    Respiratory: Negative for chest tightness and shortness of breath.    Cardiovascular: Negative for chest pain.   Gastrointestinal: Negative for abdominal pain.   Genitourinary: Negative for difficulty urinating.   Musculoskeletal: Positive for arthralgias.   Neurological: Negative for dizziness.   Psychiatric/Behavioral: Negative for sleep disturbance.         Assessments:               Environmental: single story home, resides with spouse Gabriele, home is slightly cluttered, 2 medium sized dogs roaming the home, protective of patient               Functional Status: mostly independent, requires assistance with medication planning, neighbor Ary comes over daily to assist with housekeeping and some meal prep. Patient and spouse have a sitter Marisol that comes M W Th for 4 hrs per day               Safety: unsteady gait at times, dogs inside the home, leg swelling               Nutritional: has adequate access, reports "good appetite"               Home Health/DME/Supplies: No Home Health. DME: rollator     Symptom Assessment (ESAS 0-10 scale)      ESAS 0 1 2 3 4 5 6 7 8 9 10   Pain x                       Dyspnea     x                   Anxiety x                       Nausea x                       Depression  x                       Anorexia x                       Fatigue     x       "             Insomnia x                       Restlessness  x                       Agitation x                          Constipation    no  Bowel Management Plan (BMP): no  Diarrhea        no  Comments: reports has BM qod     Performance Status: PPS Score 50  EGOC:  3     History:  Past Medical History:   Diagnosis Date    Arthritis     Asthma     recent bronchits treated and resoved with Levaquin. Last Nov 2011.    Cataract     ou done//    Diabetes mellitus     diet controlled    GERD (gastroesophageal reflux disease)     Hypertension     Phlebitis and thrombophlebitis of the leg 2005 before    Skin lesion of hand      Family History   Problem Relation Age of Onset    Stroke Mother     Heart disease Father     Amblyopia Neg Hx     Blindness Neg Hx     Cancer Neg Hx     Cataracts Neg Hx     Diabetes Neg Hx     Glaucoma Neg Hx     Hypertension Neg Hx     Macular degeneration Neg Hx     Retinal detachment Neg Hx     Strabismus Neg Hx     Thyroid disease Neg Hx      Past Surgical History:   Procedure Laterality Date    CATARACT EXTRACTION      os d 11/6/13// od d 4/16/14//    EYE SURGERY      both eyes    JOINT REPLACEMENT      bilateral knee replacement     Review of patient's allergies indicates:   Allergen Reactions    No known drug allergies        Medications:    Current Outpatient Medications:     ergocalciferol (ERGOCALCIFEROL) 50,000 unit Cap, Take 1 capsule (50,000 Units total) by mouth every 7 days. For vitamin D, Disp: 12 capsule, Rfl: 0    furosemide (LASIX) 20 MG tablet, Take 1 tablet (20 mg total) by mouth once daily. For leg swelling, Disp: 90 tablet, Rfl: 3    24h Oral Morphine Equivalents (OME):  N/A    Objective:     Physical Exam:  Vitals:    07/20/20 1315   BP: 112/70   Pulse: 90   Resp: 16   Temp: 98.6 °F (37 °C)   TempSrc: Temporal   SpO2: 95%   Weight: 83.4 kg (183 lb 12.8 oz)   PainSc: 0-No pain     Body mass index is 37.12 kg/m².    Physical Exam   Constitutional:  She is oriented to person, place, and time. She appears well-developed and well-nourished.   HENT:   Head: Normocephalic.   Eyes: Conjunctivae and EOM are normal.   Cardiovascular:   Irregular rate, regular rhythm    Pulmonary/Chest: Effort normal and breath sounds normal. No respiratory distress.   Abdominal: Soft. Bowel sounds are normal. She exhibits no distension. There is no tenderness.   Musculoskeletal: She exhibits deformity.   Bilateral knee crepitus, soft tissue swelling noted in both knees, mild warmth in left knee compared to right. No erythema no effusion noted    Neurological: She is alert and oriented to person, place, and time. Has some intermittent mild confusion.  Skin:   Left leg with 2+ pitting edema from lower leg to foot.   Right foot with 1+ pitting edema.   Purplish/red discoloration of both feet with rough, scaly changes to skin   No lesions or ulcers noted on feet or legs.   No warmth on palpation or tenderness with palpation of legs    Scattered ecchymosis and purpura to BLE          Psychiatric: She has a normal mood and affect.          Labs:  CBC:   WBC   Date Value Ref Range Status   03/04/2020 4.78 3.90 - 12.70 K/uL Final       Hgb   Date Value Ref Range Status   08/02/2013 14.0 12.0 - 16.0 g/dl Final     Hemoglobin   Date Value Ref Range Status   03/04/2020 14.3 12.0 - 16.0 g/dL Final       Hematocrit   Date Value Ref Range Status   03/04/2020 45.6 37.0 - 48.5 % Final       Mean Corpuscular Volume   Date Value Ref Range Status   03/04/2020 103 (H) 82 - 98 fL Final       Platelets   Date Value Ref Range Status   03/04/2020 221 150 - 350 K/uL Final       LFT:   Lab Results   Component Value Date    AST 29 03/04/2020    ALKPHOS 75 03/04/2020    BILITOT 1.2 (H) 03/04/2020       Albumin:   Albumin   Date Value Ref Range Status   03/04/2020 3.0 (L) 3.5 - 5.2 g/dL Final     Protein:   Total Protein   Date Value Ref Range Status   03/04/2020 7.4 6.0 - 8.4 g/dL Final       Radiology:  I have  reviewed all pertinent imaging results/findings within the past 24 hours.    Psychosocial/Cultural/Spiritual:   · F- [place] and Belief:  Holiness              · I - Importance: very important  · C - Community: [facility]              A - Address in Care: [employer] members visit weekly to give communion       Ethical / Legal: Advance Care Planning   · Surrogate decision maker:  Eugenio Pires, Relationship: daughter  · Code Status:  DNR  · LaPOST:  None at present  · Other advance directive:  HPOA, Living Bhavesh, Capacity to make medical decisions:  impaired, Conflict none       Assessment:     1. Primary osteoarthritis of both knees    2. Chronic diastolic congestive heart failure    3. Palliative care encounter    4. Bilateral lower extremity edema    5. Debility    6. Noncompliance with medication regimen    7. Vitamin D deficiency    8. Mild cognitive impairment    9. CKD (chronic kidney disease) stage 3, GFR 30-59 ml/min    10. Essential hypertension    11. Sensorineural hearing loss (SNHL) of both ears    12. Senile purpura        Plan:   Fracisco was seen today for follow-up.    Diagnoses and all orders for this visit:    Primary osteoarthritis of both knees    Chronic diastolic congestive heart failure  -     Ambulatory referral/consult to [facility]  -     Ambulatory referral/consult to [facility]; Future    Palliative care encounter    Bilateral lower extremity edema    Debility    Noncompliance with medication regimen    Vitamin D deficiency    Mild cognitive impairment    CKD (chronic kidney disease) stage 3, GFR 30-59 ml/min    Essential hypertension    Sensorineural hearing loss (SNHL) of both ears    Senile purpura    Were controlled substances prescribed?  No    > 50% of 60 min visit spent in chart review, face to face discussion of goals of care,  symptom assessment, coordination of care and emotional support.    Follow Up Appointments:    Future Appointments   Date Time Provider Department Center   8/27/2020  2:00 PM [facility]   Verbal consent of visit obtained from patient today.    Signature:  Isabel Quintero NP    Attestation:   Screening criteria to assess the level of the patient's risk for infection with COVID-19 as recommended by the CDC at the time of the above documented home visit concluded appropriateness to proceed. Universal precautions were maintained at all times, including provider wearing a mask and gloves during entire visit and use of 60% alcohol gel hand  immediately prior to entry and upon departure of patient's home as well as cleaning of equipment used in home visit with antibacterial/germicidal disposable wipes.

## 2020-08-24 PROBLEM — Z09 FOLLOW UP: Status: RESOLVED | Noted: 2020-05-24 | Resolved: 2020-08-24

## 2020-08-31 ENCOUNTER — TELEPHONE (OUTPATIENT)
Dept: PRIMARY CARE CLINIC | Facility: CLINIC | Age: 85
End: 2020-08-31

## 2020-08-31 ENCOUNTER — CARE AT HOME (OUTPATIENT)
Dept: HOME HEALTH SERVICES | Facility: CLINIC | Age: 85
End: 2020-08-31
Payer: MEDICARE

## 2020-08-31 DIAGNOSIS — Z91.148 NONCOMPLIANCE WITH MEDICATION REGIMEN: ICD-10-CM

## 2020-08-31 DIAGNOSIS — I10 ESSENTIAL HYPERTENSION: ICD-10-CM

## 2020-08-31 DIAGNOSIS — R60.0 BILATERAL LOWER EXTREMITY EDEMA: Primary | ICD-10-CM

## 2020-08-31 DIAGNOSIS — Z51.5 PALLIATIVE CARE ENCOUNTER: ICD-10-CM

## 2020-08-31 DIAGNOSIS — F10.20 ALCOHOL DEPENDENCE, DAILY USE: ICD-10-CM

## 2020-08-31 DIAGNOSIS — I50.32 CHRONIC DIASTOLIC CONGESTIVE HEART FAILURE: Chronic | ICD-10-CM

## 2020-08-31 PROCEDURE — 99350 HOME/RES VST EST HIGH MDM 60: CPT | Mod: S$GLB,,, | Performed by: NURSE PRACTITIONER

## 2020-08-31 PROCEDURE — 1101F PT FALLS ASSESS-DOCD LE1/YR: CPT | Mod: CPTII,S$GLB,, | Performed by: NURSE PRACTITIONER

## 2020-08-31 PROCEDURE — 1159F MED LIST DOCD IN RCRD: CPT | Mod: S$GLB,,, | Performed by: NURSE PRACTITIONER

## 2020-08-31 PROCEDURE — 1101F PR PT FALLS ASSESS DOC 0-1 FALLS W/OUT INJ PAST YR: ICD-10-PCS | Mod: CPTII,S$GLB,, | Performed by: NURSE PRACTITIONER

## 2020-08-31 PROCEDURE — 99350 PR HOME VISIT,ESTAB PATIENT,LEVEL IV: ICD-10-PCS | Mod: S$GLB,,, | Performed by: NURSE PRACTITIONER

## 2020-08-31 PROCEDURE — 1126F AMNT PAIN NOTED NONE PRSNT: CPT | Mod: S$GLB,,, | Performed by: NURSE PRACTITIONER

## 2020-08-31 PROCEDURE — 1159F PR MEDICATION LIST DOCUMENTED IN MEDICAL RECORD: ICD-10-PCS | Mod: S$GLB,,, | Performed by: NURSE PRACTITIONER

## 2020-08-31 PROCEDURE — 1126F PR PAIN SEVERITY QUANTIFIED, NO PAIN PRESENT: ICD-10-PCS | Mod: S$GLB,,, | Performed by: NURSE PRACTITIONER
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
## 2020-08-31 NOTE — TELEPHONE ENCOUNTER
----- Message from Rachel Millard MD sent at 8/27/2020  9:11 AM CDT -----  See if pt is able to be seen on Tuesday morning, I have no patient scheduled at this time

## 2020-09-01 ENCOUNTER — EXTERNAL VISIT (OUTPATIENT)
Dept: PRIMARY CARE CLINIC | Facility: CLINIC | Age: 85
End: 2020-09-01
Payer: MEDICARE

## 2020-09-01 VITALS
SYSTOLIC BLOOD PRESSURE: 168 MMHG | OXYGEN SATURATION: 97 % | HEART RATE: 89 BPM | RESPIRATION RATE: 16 BRPM | DIASTOLIC BLOOD PRESSURE: 98 MMHG | TEMPERATURE: 99 F

## 2020-09-01 DIAGNOSIS — I87.2 VENOUS STASIS DERMATITIS OF BOTH LOWER EXTREMITIES: Primary | ICD-10-CM

## 2020-09-01 DIAGNOSIS — J84.9 ILD (INTERSTITIAL LUNG DISEASE): ICD-10-CM

## 2020-09-01 DIAGNOSIS — G31.84 MILD COGNITIVE IMPAIRMENT: ICD-10-CM

## 2020-09-01 DIAGNOSIS — I50.32 CHRONIC DIASTOLIC CONGESTIVE HEART FAILURE: Chronic | ICD-10-CM

## 2020-09-01 DIAGNOSIS — M17.0 PRIMARY OSTEOARTHRITIS OF BOTH KNEES: ICD-10-CM

## 2020-09-01 DIAGNOSIS — Z71.89 ADVANCED CARE PLANNING/COUNSELING DISCUSSION: ICD-10-CM

## 2020-09-01 DIAGNOSIS — R60.0 BILATERAL LOWER EXTREMITY EDEMA: ICD-10-CM

## 2020-09-01 DIAGNOSIS — Z91.148 NONCOMPLIANCE WITH MEDICATION REGIMEN: ICD-10-CM

## 2020-09-01 DIAGNOSIS — N18.30 CKD (CHRONIC KIDNEY DISEASE) STAGE 3, GFR 30-59 ML/MIN: Chronic | ICD-10-CM

## 2020-09-01 DIAGNOSIS — I10 ESSENTIAL HYPERTENSION: Chronic | ICD-10-CM

## 2020-09-01 PROBLEM — F10.20 ALCOHOL DEPENDENCE, DAILY USE: Status: ACTIVE | Noted: 2020-09-01

## 2020-09-01 PROCEDURE — 99350 PR HOME VISIT,ESTAB PATIENT,LEVEL IV: ICD-10-PCS | Mod: 25,S$GLB,, | Performed by: INTERNAL MEDICINE

## 2020-09-01 PROCEDURE — 99350 HOME/RES VST EST HIGH MDM 60: CPT | Mod: 25,S$GLB,, | Performed by: INTERNAL MEDICINE

## 2020-09-01 PROCEDURE — 96372 THER/PROPH/DIAG INJ SC/IM: CPT | Mod: S$GLB,,, | Performed by: INTERNAL MEDICINE

## 2020-09-01 PROCEDURE — 96372 PR INJECTION,THERAP/PROPH/DIAG2ST, IM OR SUBCUT: ICD-10-PCS | Mod: S$GLB,,, | Performed by: INTERNAL MEDICINE

## 2020-09-01 RX ORDER — FUROSEMIDE 10 MG/ML
80 INJECTION INTRAMUSCULAR; INTRAVENOUS ONCE
Status: COMPLETED | OUTPATIENT
Start: 2020-09-01 | End: 2020-09-01

## 2020-09-01 RX ADMIN — FUROSEMIDE 80 MG: 10 INJECTION INTRAMUSCULAR; INTRAVENOUS at 10:09

## 2020-09-01 NOTE — PROGRESS NOTES
Primary Care Provider Appointment- HOME VISIT    Subjective:      Patient ID: Fracisco Pires is a 89 y.o. female. osteoarthritis of both knees s/p TKR, debility, homebound status, obesity, HTN, HLD, NSTEMI, hx of [facility] hospitalizations for sepsis due to UTI and cellulitis, chronic diastolic dysufnction, interstital fibrosis    Chief Complaint: Follow-up    Seen at home due to homebound status due to severe OA in the knees and severe bilateral LE edema that limits her mobility.     Patient was still in bed sleeping upon arrival. Her  notes that she is the same as far as taking her medications, she will not take unless someone gives her the medication to take.     Was seen by palliative care yesterday, pt noted to have blisters with drainage on both lower legs. Home health was recommended for wound care and monitoring.     Pt admits to not taking lasix when her sitter is not there. She denies any pain in her lower legs.      wants patient to be admitted for diuresis. Pt states that she does not want to go to to the hospital.       Past Surgical History:   Procedure Laterality Date    CATARACT EXTRACTION      os d 11/6/13// od d 4/16/14//    EYE SURGERY      both eyes    JOINT REPLACEMENT      bilateral knee replacement       Past Medical History:   Diagnosis Date    Arthritis     Asthma     recent bronchits treated and resoved with Levaquin. Last Nov 2011.    Cataract     ou done//    Diabetes mellitus     diet controlled    GERD (gastroesophageal reflux disease)     Hypertension     Phlebitis and thrombophlebitis of the leg 2005 before    Skin lesion of hand        Review of Systems   Constitutional: Negative for activity change and appetite change.   HENT: Negative for congestion.    Respiratory: Negative for cough and shortness of breath.    Cardiovascular: Negative for chest pain and palpitations.   Gastrointestinal: Negative for abdominal pain, constipation, diarrhea, nausea and  vomiting.   Musculoskeletal: Positive for arthralgias.   Neurological: Negative for dizziness, weakness and light-headedness.   Psychiatric/Behavioral: Negative for sleep disturbance.       Objective:   VS: /70, HR 95, RR 12, O2 sat 93% on RA, no pain      Physical Exam  Vitals signs reviewed.   Constitutional:       General: She is not in acute distress.     Appearance: She is obese.   HENT:      Head: Normocephalic.   Eyes:      Extraocular Movements: Extraocular movements intact.      Conjunctiva/sclera: Conjunctivae normal.   Cardiovascular:      Rate and Rhythm: Normal rate and regular rhythm.   Pulmonary:      Effort: Pulmonary effort is normal.      Breath sounds: Normal breath sounds.   Abdominal:      General: Bowel sounds are normal.      Palpations: Abdomen is soft.      Tenderness: There is no abdominal tenderness.   Skin:     Comments: See pictures   3-4+ edema of bilateral legs worse in feet   Left foot with blisters, no active drainage. Evidence of healing blisters on right leg. Erythematous rash of lower legs, no warmth or tenderness with palpation    Neurological:      General: No focal deficit present.      Mental Status: She is alert. Mental status is at baseline.   Psychiatric:         Mood and Affect: Mood normal.                     Lab Results   Component Value Date    WBC 4.78 03/04/2020    HGB 14.3 03/04/2020    HCT 45.6 03/04/2020     03/04/2020    CHOL 140 11/27/2014    TRIG 48 11/27/2014    HDL 55 11/27/2014    ALT 12 03/04/2020    AST 29 03/04/2020     03/04/2020    K 4.2 03/04/2020     03/04/2020    CREATININE 0.8 03/04/2020    BUN 11 03/04/2020    CO2 25 03/04/2020    TSH 0.467 11/27/2014    HGBA1C 6.1 01/20/2016         Assessment:   89 y.o. female with multiple co-morbid illnesses here to continue work-up of chronic issues notably osteoarthritis of both knees s/p TKR, debility, homebound status, obesity, HTN, HLD, NSTEMI, hx of mulitple hospitalizations for  sepsis due to UTI and cellulitis, chronic diastolic dysufnction, interstital fibrosis, lymphedema of lower legs     Worsening swelling of lower legs however pt does not take diuretics consistently due to frequent urination.       Plan:     Problem List Items Addressed This Visit        Neuro    Mild cognitive impairment     Pt cognitively intact enough to make her own medical decisions   Continue to monitor and involve family             Psychiatric    Noncompliance with medication regimen     Pt does not routinely take medications and has not done so in years  Have discussed risk of decline by not taking standard of care medications             Pulmonary    ILD (interstitial lung disease)     Lungs clear on exam   Stable monitor             Cardiac/Vascular    Hypertension (Chronic)     Stable off medications   Monitor   Pt does not follow low sodium diet          Chronic diastolic congestive heart failure (Chronic)     Despite pt not taking medications, she has not had a HF exacerbation   Pt is asymptomatic   Had discussion with patient and her family, there is an agreement for her to take lasix to help with swelling but otherwise pt goal is comfort care at home.            Venous stasis dermatitis of both lower extremities - Primary     Wrapped both legs, left supplies for HH to use    Given lasix 80 mg IM injection and will follow up on response , instructed to take lasix BID for the next 3 days as much as possible   No evidence of cellulitis or condition to warrant ED evaluation or hospitalization   Discussed pt goals of care with , will also call daughter Maris to discuss but previously per patient and daughter, her goal is comfort care and to stay at home.   Will reach out to HH for teaching for aide to do leg wrapping at home to help with LE swelling             Renal/    CKD (chronic kidney disease) stage 3, GFR 30-59 ml/min (Chronic)     Last GFR of 60   Check labs annually per pt goals              Orthopedic    Primary osteoarthritis of both knees     Conservative management   Pt will not take medications for pain             Palliative Care    Advanced care planning/counseling discussion     Pt goal is to stay at home   Family has sitters within the home   Cont with palliative services             Other    Bilateral lower extremity edema     See venous stasis dermiatitis                Health Maintenance       Date Due Completion Date    TETANUS VACCINE 03/08/1947 ---    Aspirin/Antiplatelet Therapy 03/08/1947 ---    Shingles Vaccine (1 of 2) 03/08/1979 ---    Lipid Panel 11/27/2019 11/27/2014    Influenza Vaccine (1) 09/01/2020 10/10/2019          Follow up in about 3 months (around 12/1/2020) for home visit for in December . . Total face-to-face time was 60 min, greater than 50% of this was spent on counseling and coordination of care. The following issues were discussed: goals of care, venous stasis dermatitis      Rachel Millard MD  Internal Medicine- Geriatrics  [facility] [place]

## 2020-09-02 ENCOUNTER — TELEPHONE (OUTPATIENT)
Dept: PRIMARY CARE CLINIC | Facility: CLINIC | Age: 85
End: 2020-09-02

## 2020-09-02 VITALS — OXYGEN SATURATION: 93 % | HEART RATE: 95 BPM | RESPIRATION RATE: 12 BRPM

## 2020-09-02 PROBLEM — I87.2 VENOUS STASIS DERMATITIS OF BOTH LOWER EXTREMITIES: Status: ACTIVE | Noted: 2020-09-02

## 2020-09-02 NOTE — ASSESSMENT & PLAN NOTE
Pt does not routinely take medications and has not done so in years  Have discussed risk of decline by not taking standard of care medications

## 2020-09-02 NOTE — ASSESSMENT & PLAN NOTE
Wrapped both legs, left supplies for HH to use    Given lasix 80 mg IM injection and will follow up on response , instructed to take lasix BID for the next 3 days as much as possible   No evidence of cellulitis or condition to warrant ED evaluation or hospitalization   Discussed pt goals of care with , will also call daughter Maris to discuss but previously per patient and daughter, her goal is comfort care and to stay at home.   Will reach out to HH for teaching for aide to do leg wrapping at home to help with LE swelling

## 2020-09-02 NOTE — PATIENT INSTRUCTIONS
Patient Instructions:  - [facility] Nurse Practitioner to schedule home follow-up visit with patient in 4-6 weeks or as needed.  - Continue all medications, treatments and therapies as ordered.   - Follow all instructions, recommendations as discussed.  - Maintain Safety Precautions at all times.  - Attend all medical appointments as scheduled.  - For worsening symptoms: call Primary Care Physician or Nurse Practitioner.  - For emergencies, call 911 or immediately report to the nearest emergency room.  - Limit Risks of environmental exposure to coronavirus/COVID-19 as discussed including: social distancing, hand hygiene, and limiting departures from the home for necessities only.   -Elevate feet while seated. Please take Lasix as prescribed. Try to follow low sodium diet. Notify NP for any skin breakdown in legs. (discussed with patient and jared Damon)  -[facility] ordered today for assistance/teaching/wound care to BLE edema/blisters.      Leg Swelling in Both Legs    Swelling of the feet, ankles, and legs is called edema. It is caused by excess fluid that has collected in the tissues. Extra fluid in the body settles in the lowest part because of gravity. This is why the legs and feet are most affected.  Some of the causes for edema include:  · Disease of the heart like congestive heart failure  · Standing or sitting for long periods of time  · Infection of the feet or legs  · Blood pooling in the veins of your legs (venous insufficiency)  · Dilated veins in your lower leg (varicose veins)  · Garters or other clothing that is tight on your legs. This will cause blood to pool in your legs because the clothing limits blood flow.  · Some medicines such as hormones like birth control pills, some blood pressure medicines like calcium channel blockers (amlodipine) and steroids, some antidepressants like MAO inhibitors and tricyclics  · Menstrual periods that cause you to retain fluids  · Many types of renal  disease  · Liver failure or cirrhosis  · Pregnancy, some swelling is normal, but a sudden increase in leg swelling or weight gain can be a sign of a dangerous complication of pregnancy  · Poor nutrition  · Thyroid disease  Medical treatment will depend on what is causing the swelling in your legs. Your healthcare provider may prescribe water pills (diuretics) to get rid of the extra fluid.  Home care  Follow these guidelines when caring for yourself at home:  · Don't wear clothing like garters that is tight on your legs.  · Keep your legs up while lying or sitting.  · If infection, injury, or recent surgery is causing the swelling, stay off your legs as much as possible until symptoms get better.  · If your healthcare provider says that your leg swelling is caused by venous insufficiency or varicose veins, don't sit or  one place for long periods of time. Take breaks and walk about every few hours. Brisk walking is a good exercise. It helps circulate the blood that has collected in your leg. Talk with your provider about using support stockings to stop daytime leg swelling.  · If your provider says that heart disease is causing your leg swelling, follow a low-salt diet to stop extra fluid from staying in your body. You may also need medicine.  Follow-up care  Follow up with your healthcare provider, or as advised.  When to seek medical advice  Call your healthcare provider right away if any of these occur:  · New shortness of breath or chest pain  · Shortness of breath or chest pain that gets worse  · Swelling in both legs or ankles that gets worse  · Swelling of the abdomen  · Redness, warmth, or swelling in one leg  · Fever of 100.4ºF (38ºC) or higher, or as directed by your healthcare provider  · Yellow color to your skin or eyes  · Rapid, unexplained weight gain  · Having to sleep upright or use an increased number of pillows  Date Last Reviewed: 3/31/2016  © 2237-7641 [employer] [address]  [place] [address]. All rights reserved. This information is not intended as a substitute for professional medical care. Always follow your healthcare professional's instructions.          Low-Salt Diet  This diet removes foods that are high in salt. It also limits the amount of salt you use when cooking. It is most often used for people with high blood pressure, edema (fluid retention), and kidney, liver, or heart disease.  Table salt contains the mineral sodium. Your body needs sodium to work normally. But too much sodium can make your health problems worse. Your healthcare provider is recommending a low-salt (also called low-sodium) diet for you. Your total daily allowance of salt is 1,500 to 2,300 milligrams (mg). It is less than 1 teaspoon of table salt. This means you can have only about 500 to 700 mg of sodium at each meal. People with certain health problems should limit salt intake to the lower end of the recommended range.    When you cook, dont add much salt. If you can cook without using salt, even better. Dont add salt to your food at the table.  When shopping, read food labels. Salt is often called sodium on the label. Choose foods that are salt-free, low salt, or very low salt. Note that foods with reduced salt may not lower your salt intake enough.    Beans, potatoes, and pasta  Ok: Dry beans, split peas, lentils, potatoes, rice, macaroni, pasta, spaghetti without added salt  Avoid: Potato chips, tortilla chips, and similar products  Breads and cereals  Ok: Low-sodium breads, rolls, cereals, and cakes; low-salt crackers, matzo crackers  Avoid: Salted crackers, pretzels, popcorn, Lebanese toast, pancakes, muffins  Dairy  Ok: Milk, chocolate milk, hot chocolate mix, low-salt cheeses, and yogurt  Avoid: Processed cheese and cheese spreads; Roquefort, Camembert, and cottage cheese; buttermilk, instant breakfast drink  Desserts  Ok: Ice cream, frozen yogurt, juice bars, gelatin, cookies  and pies, sugar, honey, jelly, hard candy  Avoid: Most pies, cakes and cookies prepared or processed with salt; instant pudding  Drinks  Ok: Tea, coffee, fizzy (carbonated) drinks, juices  Avoid: Flavored coffees, electrolyte replacement drinks, sports drinks  Meats  Ok: All fresh meat, fish, poultry, low-salt tuna, eggs, egg substitute  Avoid: Smoked, pickled, brine-cured, or salted meats and fish. This includes pacheco, chipped beef, corned beef, hot dogs, deli meats, ham, kosher meats, salt pork, sausage, canned tuna, salted codfish, smoked salmon, herring, sardines, or anchovies.  Seasonings and spices  Ok: Most seasonings are okay. Good substitutes for salt include: fresh herb blends, hot sauce, lemon, garlic, mcgraw, vinegar, dry mustard, parsley, cilantro, horseradish, tomato paste, regular margarine, mayonnaise, unsalted butter, cream cheese, vegetable oil, cream, low-salt salad dressing and gravy.  Avoid: Regular ketchup, relishes, pickles, soy sauce, teriyaki sauce, Worcestershire sauce, BBQ sauce, tartar sauce, meat tenderizer, chili sauce, regular gravy, regular salad dressing, salted butter  Soups  Ok: Low-salt soups and broths made with allowed foods  Avoid: Bouillon cubes, soups with smoked or salted meats, regular soup and broth  Vegetables  Ok: Most vegetables are okay; also low-salt tomato and vegetable juices  Avoid: Sauerkraut and other brine-soaked vegetables; pickles and other pickled vegetables; tomato juice, olives  Date Last Reviewed: 8/1/2016  © 8104-6130 [employer]. [address] [place] [address], [place] [address]. All rights reserved. This information is not intended as a substitute for professional medical care. Always follow your healthcare professional's instructions.        Established High Blood Pressure    High blood pressure (hypertension) is a chronic disease. Often, healthcare providers dont know what causes it. But it can be caused by certain health conditions and  medicines.  If you have high blood pressure, you may not have any symptoms. If you do have symptoms, they may include headache, dizziness, changes in your vision, chest pain, and shortness of breath. But even without symptoms, high blood pressure thats not treated raises your risk for heart attack and stroke. High blood pressure is a serious health risk and shouldnt be ignored.  A blood pressure reading is made up of two numbers: a higher number over a lower number. The top number is the systolic pressure. The bottom number is the diastolic pressure. A normal blood pressure is a systolic pressure of  less than 120 over a diastolic pressure of less than 80. You will see your blood pressure readings written together. For example, a person with a systolic pressure of 188 and a diastolic pressure of 78 will have 118/78 written in the medical record.  High blood pressure is when either the top number is 140 or higher, or the bottom number is 90 or higher. This must be the result when taking your blood pressure a number of times. The blood pressures between normal and high are called prehypertension.  Home care  If you have high blood pressure, you should do what is listed below to lower your blood pressure. If you are taking medicines for high blood pressure, these methods may reduce or end your need for medicines in the future.  · Begin a weight-loss program if you are overweight.  · Cut back on how much salt you get in your diet. Heres how to do this:  ¨ Dont eat foods that have a lot of salt. These include olives, pickles, smoked meats, and salted potato chips.  ¨ Dont add salt to your food at the table.  ¨ Use only small amounts of salt when cooking.  · Start an exercise program. Talk with your healthcare provider about the type of exercise program that would be best for you. It doesn't have to be hard. Even brisk walking for 20 minutes 3 times a week is a good form of exercise.  · Dont take medicines that  stimulate the heart. This includes many over-the-counter cold and sinus decongestant pills and sprays, as well as diet pills. Check the warnings about hypertension on the label. Before buying any over-the-counter medicines or supplements, always ask the pharmacist about the product's potential interaction with your high blood pressure and your high blood pressure medicines.  · Stimulants such as amphetamine or cocaine could be deadly for someone with high blood pressure. Never take these.  · Limit how much caffeine you get in your diet. Switch to caffeine-free products.  · Stop smoking. If you are a long-time smoker, this can be hard. Talk to your healthcare provider about medicines and nicotine replacement options to help you. Also, enroll in a stop-smoking program to make it more likely that you will quit for good.  · Learn how to handle stress. This is an important part of any program to lower blood pressure. Learn about relaxation methods like meditation, yoga, or biofeedback.  · If your provider prescribed medicines, take them exactly as directed. Missing doses may cause your blood pressure get out of control.  · If you miss a dose or doses, check with your healthcare provider or pharmacist about what to do.  · Consider buying an automatic blood pressure machine. Ask your provider for a recommendation. You can get one of these at most pharmacies.     The American Heart Association recommends the following guidelines for home blood pressure monitoring:  · Don't smoke or drink coffee for 30 minutes before taking your blood pressure.  · Go to the bathroom before the test.  · Relax for 5 minutes before taking the measurement.  · Sit with your back supported (don't sit on a couch or soft chair); keep your feet on the floor uncrossed. Place your arm on a solid flat surface (like a table) with the upper part of the arm at heart level. Place the middle of the cuff directly above the eye of the elbow. Check the  monitor's instruction manual for an illustration.  · Take multiple readings. When you measure, take 2 to 3 readings one minute apart and record all of the results.  · Take your blood pressure at the same time every day, or as your healthcare provider recommends.  · Record the date, time, and blood pressure reading.  · Take the record with you to your next medical appointment. If your blood pressure monitor has a built-in memory, simply take the monitor with you to your next appointment.  · Call your provider if you have several high readings. Don't be frightened by a single high blood pressure reading, but if you get several high readings, check in with your healthcare provider.  · Note: When blood pressure reaches a systolic (top number) of 180 or higher OR diastolic (bottom number) of 110 or higher, seek emergency medical treatment.  Follow-up care  You will need to see your healthcare provider regularly. This is to check your blood pressure and to make changes to your medicines. Make a follow-up appointment as directed. Bring the record of your home blood pressure readings to the appointment.  When to seek medical advice  Call your healthcare provider right away if any of these occur:  · Blood pressure reaches a systolic (upper number) of 180 or higher OR a diastolic (bottom number) of 110 or higher  · Chest pain or shortness of breath  · Severe headache  · Throbbing or rushing sound in the ears  · Nosebleed  · Sudden severe pain in your belly (abdomen)  · Extreme drowsiness, confusion, or fainting  · Dizziness or spinning sensation (vertigo)  · Weakness of an arm or leg or one side of the face  · You have problems speaking or seeing   Date Last Reviewed: 12/1/2016  © 8116-6570 [employer]. [address] [place] [address], [place] [address]. All rights reserved. This information is not intended as a substitute for professional medical care. Always follow your healthcare professional's instructions.

## 2020-09-02 NOTE — ASSESSMENT & PLAN NOTE
Despite pt not taking medications, she has not had a HF exacerbation   Pt is asymptomatic   Had discussion with patient and her family, there is an agreement for her to take lasix to help with swelling but otherwise pt goal is comfort care at home.

## 2020-09-02 NOTE — TELEPHONE ENCOUNTER
Left voicemail for mark Pollock to call back and medical student reached out to the Pranay's home for follow up for diuretic response but no one answered. Will continue to follow up.     Rachel Millard MD  Internal Medicine- Geriatrics  [facility] [place]

## 2020-09-02 NOTE — ASSESSMENT & PLAN NOTE
Pt cognitively intact enough to make her own medical decisions   Continue to monitor and involve family

## 2020-09-14 ENCOUNTER — OFFICE VISIT (OUTPATIENT)
Dept: PRIMARY CARE CLINIC | Facility: CLINIC | Age: 85
End: 2020-09-14
Payer: MEDICARE

## 2020-09-14 DIAGNOSIS — I87.2 VENOUS STASIS DERMATITIS OF BOTH LOWER EXTREMITIES: Primary | ICD-10-CM

## 2020-09-14 PROCEDURE — 99499 NO LOS: ICD-10-PCS | Mod: 95,,, | Performed by: INTERNAL MEDICINE

## 2020-09-14 PROCEDURE — 99499 UNLISTED E&M SERVICE: CPT | Mod: 95,,, | Performed by: INTERNAL MEDICINE
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
## 2020-10-23 ENCOUNTER — TELEPHONE (OUTPATIENT)
Dept: PRIMARY CARE CLINIC | Facility: CLINIC | Age: 85
End: 2020-10-23

## 2020-10-23 NOTE — TELEPHONE ENCOUNTER
----- Message from Liset Emanuel sent at 10/23/2020  8:31 AM CDT -----  Contact: Maris  Type: Needs Medical Advice  Who Called:  patient daughter Maris  Symptoms (please be specific):    How long has patient had these symptoms:    Pharmacy name and phone #:    Best Call Back Number:   Additional Information: called to advise that patient had a fall on yesterday and patient blood pressure drop to 85/56,and it came up. Wanted to know if the nurse can come out to see patient.requesting a call back to discuss home health services or what the next steps?

## 2020-10-23 NOTE — TELEPHONE ENCOUNTER
Spoke with pt daughter, stated pt fell yesterday, ? Syncopal vs slipped in urine. EMS there to assess, Pt B/P 85/56  Pt refused to go to hospital.  Pt stated to daughter that she is not going to the hospital.  Pt is non compliant. Daughter stated that pt legs are swollen and red, unknown id warm or weeping.  Daughter asked if possible HH or if maryellen could see her earlier.

## 2020-10-23 NOTE — TELEPHONE ENCOUNTER
Isabel has the following week but I can do a visit next week to check on her legs, would next Tuesday at 4 pm work?

## 2020-10-26 ENCOUNTER — HOSPITAL ENCOUNTER (INPATIENT)
Facility: HOSPITAL | Age: 85
LOS: 4 days | Discharge: HOSPICE | DRG: 871 | End: 2020-10-30
Attending: EMERGENCY MEDICINE | Admitting: STUDENT IN AN ORGANIZED HEALTH CARE EDUCATION/TRAINING PROGRAM
Payer: MEDICARE

## 2020-10-26 ENCOUNTER — EXTERNAL VISIT (OUTPATIENT)
Dept: PRIMARY CARE CLINIC | Facility: CLINIC | Age: 85
End: 2020-10-26
Payer: MEDICARE

## 2020-10-26 VITALS
TEMPERATURE: 99 F | SYSTOLIC BLOOD PRESSURE: 94 MMHG | RESPIRATION RATE: 12 BRPM | DIASTOLIC BLOOD PRESSURE: 60 MMHG | HEART RATE: 122 BPM

## 2020-10-26 DIAGNOSIS — R65.20 SEVERE SEPSIS: ICD-10-CM
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
## 2020-10-26 DIAGNOSIS — R60.0 BILATERAL LOWER EXTREMITY EDEMA: ICD-10-CM

## 2020-10-26 DIAGNOSIS — R79.89 TROPONIN LEVEL ELEVATED: ICD-10-CM

## 2020-10-26 DIAGNOSIS — I50.32 CHRONIC DIASTOLIC CONGESTIVE HEART FAILURE: Primary | Chronic | ICD-10-CM

## 2020-10-26 DIAGNOSIS — R06.02 SHORTNESS OF BREATH: ICD-10-CM

## 2020-10-26 DIAGNOSIS — M62.82 NON-TRAUMATIC RHABDOMYOLYSIS: Primary | ICD-10-CM

## 2020-10-26 DIAGNOSIS — A41.9 SEVERE SEPSIS: ICD-10-CM

## 2020-10-26 DIAGNOSIS — I87.2 VENOUS STASIS DERMATITIS OF BOTH LOWER EXTREMITIES: ICD-10-CM

## 2020-10-26 DIAGNOSIS — I21.4 NSTEMI (NON-ST ELEVATED MYOCARDIAL INFARCTION): ICD-10-CM

## 2020-10-26 DIAGNOSIS — I50.23 ACUTE ON CHRONIC SYSTOLIC CONGESTIVE HEART FAILURE: ICD-10-CM

## 2020-10-26 DIAGNOSIS — M79.89 LEG SWELLING: ICD-10-CM
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
## 2020-10-26 DIAGNOSIS — I50.9 CHF (CONGESTIVE HEART FAILURE): ICD-10-CM

## 2020-10-26 DIAGNOSIS — R94.31 ABNORMAL EKG: ICD-10-CM

## 2020-10-26 DIAGNOSIS — I10 ESSENTIAL HYPERTENSION: Chronic | ICD-10-CM

## 2020-10-26 PROBLEM — N30.00 ACUTE CYSTITIS: Status: ACTIVE | Noted: 2020-10-26

## 2020-10-26 LAB
ALBUMIN SERPL BCP-MCNC: 2.9 G/DL (ref 3.5–5.2)
ALP SERPL-CCNC: 50 U/L (ref 55–135)
ALT SERPL W/O P-5'-P-CCNC: 29 U/L (ref 10–44)
AMMONIA PLAS-SCNC: 27 UMOL/L (ref 10–50)
ANION GAP SERPL CALC-SCNC: 10 MMOL/L (ref 8–16)
AST SERPL-CCNC: 112 U/L (ref 10–40)
BACTERIA #/AREA URNS HPF: ABNORMAL /HPF
BASOPHILS # BLD AUTO: 0.04 K/UL (ref 0–0.2)
BASOPHILS NFR BLD: 0.4 % (ref 0–1.9)
BILIRUB SERPL-MCNC: 3 MG/DL (ref 0.1–1)
BILIRUB UR QL STRIP: ABNORMAL
BNP SERPL-MCNC: 1489 PG/ML (ref 0–99)
BUN SERPL-MCNC: 23 MG/DL (ref 10–30)
CALCIUM SERPL-MCNC: 9.4 MG/DL (ref 8.7–10.5)
CHLORIDE SERPL-SCNC: 106 MMOL/L (ref 95–110)
CK SERPL-CCNC: 1796 U/L (ref 20–180)
CLARITY UR: ABNORMAL
CO2 SERPL-SCNC: 25 MMOL/L (ref 23–29)
COLOR UR: YELLOW
CREAT SERPL-MCNC: 0.8 MG/DL (ref 0.5–1.4)
DIFFERENTIAL METHOD: ABNORMAL
EOSINOPHIL # BLD AUTO: 0 K/UL (ref 0–0.5)
EOSINOPHIL NFR BLD: 0.3 % (ref 0–8)
ERYTHROCYTE [DISTWIDTH] IN BLOOD BY AUTOMATED COUNT: 14.6 % (ref 11.5–14.5)
EST. GFR  (AFRICAN AMERICAN): >60 ML/MIN/1.73 M^2
EST. GFR  (NON AFRICAN AMERICAN): >60 ML/MIN/1.73 M^2
GLUCOSE SERPL-MCNC: 127 MG/DL (ref 70–110)
GLUCOSE UR QL STRIP: NEGATIVE
HCT VFR BLD AUTO: 38.9 % (ref 37–48.5)
HGB BLD-MCNC: 12.5 G/DL (ref 12–16)
HGB UR QL STRIP: NEGATIVE
HYALINE CASTS #/AREA URNS LPF: 55 /LPF
IMM GRANULOCYTES # BLD AUTO: 0.05 K/UL (ref 0–0.04)
IMM GRANULOCYTES NFR BLD AUTO: 0.5 % (ref 0–0.5)
INR PPP: 1.5
KETONES UR QL STRIP: ABNORMAL
LACTATE SERPL-SCNC: 3 MMOL/L (ref 0.5–1.9)
LACTATE SERPL-SCNC: 3.2 MMOL/L (ref 0.5–1.9)
LACTATE SERPL-SCNC: 3.5 MMOL/L (ref 0.5–1.9)
LEUKOCYTE ESTERASE UR QL STRIP: ABNORMAL
LYMPHOCYTES # BLD AUTO: 1.2 K/UL (ref 1–4.8)
LYMPHOCYTES NFR BLD: 13.1 % (ref 18–48)
MAGNESIUM SERPL-MCNC: 1.9 MG/DL (ref 1.6–2.6)
MCH RBC QN AUTO: 33.8 PG (ref 27–31)
MCHC RBC AUTO-ENTMCNC: 32.1 G/DL (ref 32–36)
MCV RBC AUTO: 105 FL (ref 82–98)
MICROSCOPIC COMMENT: ABNORMAL
MONOCYTES # BLD AUTO: 0.8 K/UL (ref 0.3–1)
MONOCYTES NFR BLD: 8.1 % (ref 4–15)
NEUTROPHILS # BLD AUTO: 7.3 K/UL (ref 1.8–7.7)
NEUTROPHILS NFR BLD: 77.6 % (ref 38–73)
NITRITE UR QL STRIP: NEGATIVE
NRBC BLD-RTO: 0 /100 WBC
PH UR STRIP: 6 [PH] (ref 5–8)
PHOSPHATE SERPL-MCNC: 3.3 MG/DL (ref 2.7–4.5)
PLATELET # BLD AUTO: 133 K/UL (ref 150–350)
PMV BLD AUTO: 10.7 FL (ref 9.2–12.9)
POTASSIUM SERPL-SCNC: 5 MMOL/L (ref 3.5–5.1)
PROT SERPL-MCNC: 7 G/DL (ref 6–8.4)
PROT UR QL STRIP: ABNORMAL
PROTHROMBIN TIME: 17 SEC (ref 10.6–14.8)
RBC # BLD AUTO: 3.7 M/UL (ref 4–5.4)
RBC #/AREA URNS HPF: 2 /HPF (ref 0–4)
SARS-COV-2 RDRP RESP QL NAA+PROBE: NEGATIVE
SODIUM SERPL-SCNC: 141 MMOL/L (ref 136–145)
SP GR UR STRIP: 1.03 (ref 1–1.03)
SQUAMOUS #/AREA URNS HPF: 15 /HPF
TROPONIN I SERPL DL<=0.01 NG/ML-MCNC: 2.67 NG/ML
TROPONIN I SERPL DL<=0.01 NG/ML-MCNC: 2.85 NG/ML
URN SPEC COLLECT METH UR: ABNORMAL
UROBILINOGEN UR STRIP-ACNC: ABNORMAL EU/DL
WBC # BLD AUTO: 9.37 K/UL (ref 3.9–12.7)
WBC #/AREA URNS HPF: 9 /HPF (ref 0–5)

## 2020-10-26 PROCEDURE — 85025 COMPLETE CBC W/AUTO DIFF WBC: CPT

## 2020-10-26 PROCEDURE — 82550 ASSAY OF CK (CPK): CPT

## 2020-10-26 PROCEDURE — 84100 ASSAY OF PHOSPHORUS: CPT

## 2020-10-26 PROCEDURE — 83735 ASSAY OF MAGNESIUM: CPT

## 2020-10-26 PROCEDURE — 51701 INSERT BLADDER CATHETER: CPT

## 2020-10-26 PROCEDURE — 85610 PROTHROMBIN TIME: CPT

## 2020-10-26 PROCEDURE — 93010 ELECTROCARDIOGRAM REPORT: CPT | Mod: ,,, | Performed by: INTERNAL MEDICINE

## 2020-10-26 PROCEDURE — 21400001 HC TELEMETRY ROOM

## 2020-10-26 PROCEDURE — 99285 EMERGENCY DEPT VISIT HI MDM: CPT | Mod: 25

## 2020-10-26 PROCEDURE — 93010 EKG 12-LEAD: ICD-10-PCS | Mod: ,,, | Performed by: INTERNAL MEDICINE

## 2020-10-26 PROCEDURE — 94761 N-INVAS EAR/PLS OXIMETRY MLT: CPT

## 2020-10-26 PROCEDURE — 99350 PR HOME VISIT,ESTAB PATIENT,LEVEL IV: ICD-10-PCS | Mod: S$GLB,,, | Performed by: INTERNAL MEDICINE

## 2020-10-26 PROCEDURE — 80053 COMPREHEN METABOLIC PANEL: CPT

## 2020-10-26 PROCEDURE — 99350 HOME/RES VST EST HIGH MDM 60: CPT | Mod: S$GLB,,, | Performed by: INTERNAL MEDICINE

## 2020-10-26 PROCEDURE — 63600175 PHARM REV CODE 636 W HCPCS: Performed by: STUDENT IN AN ORGANIZED HEALTH CARE EDUCATION/TRAINING PROGRAM

## 2020-10-26 PROCEDURE — 36415 COLL VENOUS BLD VENIPUNCTURE: CPT

## 2020-10-26 PROCEDURE — 93005 ELECTROCARDIOGRAM TRACING: CPT | Performed by: INTERNAL MEDICINE

## 2020-10-26 PROCEDURE — 82140 ASSAY OF AMMONIA: CPT

## 2020-10-26 PROCEDURE — 83605 ASSAY OF LACTIC ACID: CPT | Mod: 91

## 2020-10-26 PROCEDURE — 25000003 PHARM REV CODE 250: Performed by: EMERGENCY MEDICINE

## 2020-10-26 PROCEDURE — 87040 BLOOD CULTURE FOR BACTERIA: CPT | Mod: 59

## 2020-10-26 PROCEDURE — 96374 THER/PROPH/DIAG INJ IV PUSH: CPT

## 2020-10-26 PROCEDURE — 83880 ASSAY OF NATRIURETIC PEPTIDE: CPT

## 2020-10-26 PROCEDURE — 84484 ASSAY OF TROPONIN QUANT: CPT

## 2020-10-26 PROCEDURE — 63600175 PHARM REV CODE 636 W HCPCS: Performed by: EMERGENCY MEDICINE

## 2020-10-26 PROCEDURE — 99900035 HC TECH TIME PER 15 MIN (STAT)

## 2020-10-26 PROCEDURE — 25500020 PHARM REV CODE 255: Performed by: EMERGENCY MEDICINE

## 2020-10-26 PROCEDURE — 25000003 PHARM REV CODE 250: Performed by: STUDENT IN AN ORGANIZED HEALTH CARE EDUCATION/TRAINING PROGRAM

## 2020-10-26 PROCEDURE — U0002 COVID-19 LAB TEST NON-CDC: HCPCS

## 2020-10-26 PROCEDURE — 84484 ASSAY OF TROPONIN QUANT: CPT | Mod: 91

## 2020-10-26 PROCEDURE — 81001 URINALYSIS AUTO W/SCOPE: CPT

## 2020-10-26 RX ORDER — ENOXAPARIN SODIUM 100 MG/ML
40 INJECTION SUBCUTANEOUS EVERY 24 HOURS
Status: DISCONTINUED | OUTPATIENT
Start: 2020-10-26 | End: 2020-10-30 | Stop reason: HOSPADM

## 2020-10-26 RX ORDER — TALC
6 POWDER (GRAM) TOPICAL NIGHTLY PRN
Status: DISCONTINUED | OUTPATIENT
Start: 2020-10-26 | End: 2020-10-30 | Stop reason: HOSPADM

## 2020-10-26 RX ORDER — ONDANSETRON 2 MG/ML
4 INJECTION INTRAMUSCULAR; INTRAVENOUS EVERY 8 HOURS PRN
Status: DISCONTINUED | OUTPATIENT
Start: 2020-10-26 | End: 2020-10-30 | Stop reason: HOSPADM

## 2020-10-26 RX ORDER — LANOLIN ALCOHOL/MO/W.PET/CERES
800 CREAM (GRAM) TOPICAL
Status: DISCONTINUED | OUTPATIENT
Start: 2020-10-26 | End: 2020-10-30 | Stop reason: HOSPADM

## 2020-10-26 RX ORDER — TRAMADOL HYDROCHLORIDE 50 MG/1
50 TABLET ORAL EVERY 6 HOURS PRN
Status: DISCONTINUED | OUTPATIENT
Start: 2020-10-26 | End: 2020-10-30 | Stop reason: HOSPADM

## 2020-10-26 RX ORDER — POLYETHYLENE GLYCOL 3350 17 G/17G
17 POWDER, FOR SOLUTION ORAL DAILY
Status: DISCONTINUED | OUTPATIENT
Start: 2020-10-27 | End: 2020-10-30 | Stop reason: HOSPADM

## 2020-10-26 RX ORDER — SODIUM CHLORIDE 9 MG/ML
INJECTION, SOLUTION INTRAVENOUS CONTINUOUS
Status: DISCONTINUED | OUTPATIENT
Start: 2020-10-26 | End: 2020-10-27

## 2020-10-26 RX ORDER — ACETAMINOPHEN 325 MG/1
650 TABLET ORAL EVERY 4 HOURS PRN
Status: DISCONTINUED | OUTPATIENT
Start: 2020-10-26 | End: 2020-10-30 | Stop reason: HOSPADM

## 2020-10-26 RX ORDER — SODIUM CHLORIDE 0.9 % (FLUSH) 0.9 %
10 SYRINGE (ML) INJECTION
Status: DISCONTINUED | OUTPATIENT
Start: 2020-10-26 | End: 2020-10-30 | Stop reason: HOSPADM

## 2020-10-26 RX ORDER — ACETAMINOPHEN 650 MG/1
650 SUPPOSITORY RECTAL
Status: COMPLETED | OUTPATIENT
Start: 2020-10-26 | End: 2020-10-26

## 2020-10-26 RX ORDER — FUROSEMIDE 10 MG/ML
20 INJECTION INTRAMUSCULAR; INTRAVENOUS
Status: COMPLETED | OUTPATIENT
Start: 2020-10-26 | End: 2020-10-26

## 2020-10-26 RX ORDER — SODIUM CHLORIDE 9 MG/ML
1000 INJECTION, SOLUTION INTRAVENOUS CONTINUOUS
Status: DISCONTINUED | OUTPATIENT
Start: 2020-10-26 | End: 2020-10-26

## 2020-10-26 RX ADMIN — PIPERACILLIN AND TAZOBACTAM 3.38 G: 3; .375 INJECTION, POWDER, LYOPHILIZED, FOR SOLUTION INTRAVENOUS; PARENTERAL at 05:10

## 2020-10-26 RX ADMIN — SODIUM CHLORIDE 1000 ML: 0.9 INJECTION, SOLUTION INTRAVENOUS at 06:10

## 2020-10-26 RX ADMIN — ACETAMINOPHEN 650 MG: 650 SUPPOSITORY RECTAL at 12:10

## 2020-10-26 RX ADMIN — ENOXAPARIN SODIUM 40 MG: 30 INJECTION SUBCUTANEOUS at 08:10

## 2020-10-26 RX ADMIN — IOHEXOL 100 ML: 350 INJECTION, SOLUTION INTRAVENOUS at 04:10

## 2020-10-26 RX ADMIN — SODIUM CHLORIDE 1000 ML: 9 INJECTION, SOLUTION INTRAVENOUS at 03:10

## 2020-10-26 RX ADMIN — VANCOMYCIN HYDROCHLORIDE 1500 MG: 1 INJECTION, POWDER, LYOPHILIZED, FOR SOLUTION INTRAVENOUS at 08:10

## 2020-10-26 RX ADMIN — FUROSEMIDE 20 MG: 10 INJECTION, SOLUTION INTRAMUSCULAR; INTRAVENOUS at 03:10

## 2020-10-26 NOTE — PROGRESS NOTES
"Primary Care Provider Appointment- HOME VISIT    Subjective:      Patient ID: Irmggertrude Pires is a 89 y.o. female osteoarthritis of both knees s/p TKR, debility, homebound status, obesity, HTN, HLD, NSTEMI, hx of [facility] hospitalizations for sepsis due to UTI and cellulitis, chronic diastolic dysufnction, interstital fibrosis    Chief Complaint: Follow-up and Fall    Pt was schedule home visit due to fall last week, pt BP was 80/50s, paramedic were called to assist pt off of floor, she refused to go to hospital. Of note, pt has not been compliant with lasix which is usual for her.    Upon arrival to the home, informed by pt  Mr. Suazo that pt has been on the floor for 2 days. She was in her chair when she "slid" to the floor. Mr. Suazo could not  her up so he put down a pad on the floor and blankets for her. He did not call 911 because pt did not want to the hospital.     Pt has not eaten for 2 days, drank a cup of water only and has been sleeping mostly for the past 2 days. 15 year old granddaughter is there and confirms this history. Granddaugther notes that pt has an ulcer on the back of her left thigh that a family friend has been cleaning.     Pt is on the floor, sleeping - easily aroused but goes right back to sleep. Sleeps throughout vitals being done but asked to be covered by up, report being cold and says ouch when her legs are touched.           Past Surgical History:   Procedure Laterality Date    CATARACT EXTRACTION      os d 11/6/13// od d 4/16/14//    EYE SURGERY      both eyes    JOINT REPLACEMENT      bilateral knee replacement       Past Medical History:   Diagnosis Date    Arthritis     Asthma     recent bronchits treated and resoved with Levaquin. Last Nov 2011.    Cataract     ou done//    Diabetes mellitus     diet controlled    GERD (gastroesophageal reflux disease)     Hypertension     Phlebitis and thrombophlebitis of the leg 2005 before    Skin lesion of hand  "       Review of Systems   Constitutional: Positive for activity change and appetite change. Negative for fever.   HENT: Negative for congestion.    Respiratory: Negative for cough and shortness of breath.    Cardiovascular: Negative for chest pain.   Gastrointestinal: Negative for abdominal pain.   Musculoskeletal: Positive for arthralgias and gait problem.       Objective:       Physical Exam  Vitals signs reviewed.   Constitutional:       Appearance: She is obese.      Comments: Sleeping throughout   Shirt is over her head   Soiled blankets with urine    HENT:      Head: Normocephalic and atraumatic.   Cardiovascular:      Rate and Rhythm: Regular rhythm. Tachycardia present.   Pulmonary:      Effort: No respiratory distress.      Breath sounds: Wheezing present.      Comments: Wheezing in lower lung bases   Upon sitting up, pt with audible wheezing, congestion and sob   Abdominal:      General: Bowel sounds are normal.      Palpations: Abdomen is soft. There is no mass.      Tenderness: There is no abdominal tenderness.   Musculoskeletal:      Right lower leg: Edema present.      Left lower leg: Edema present.      Comments: 3-4+ edema of both legs   Blister on both feet and lower legs   Erythematous rash on b/l lower legs    Neurological:      Mental Status: She is alert.      Comments: Oriented to self, location   Cannot name president and reports her grandddaugther is her daughter in law ( does not have D-I-L)          Lab Results   Component Value Date    WBC 4.78 03/04/2020    HGB 14.3 03/04/2020    HCT 45.6 03/04/2020     03/04/2020    CHOL 140 11/27/2014    TRIG 48 11/27/2014    HDL 55 11/27/2014    ALT 12 03/04/2020    AST 29 03/04/2020     10/26/2020    K 5.0 10/26/2020     10/26/2020    CREATININE 0.8 03/04/2020    BUN 11 03/04/2020    CO2 25 10/26/2020    TSH 0.467 11/27/2014    INR 1.5 10/26/2020    HGBA1C 6.1 01/20/2016         Assessment:   89 y.o. female with multiple co-morbid  illnesses here to continue work-up of chronic issues notably osteoarthritis of both knees s/p TKR, debility, homebound status, obesity, HTN, HLD, NSTEMI, hx of mulitple hospitalizations for sepsis due to UTI and cellulitis, chronic diastolic dysufnction, interstital fibrosis, noncompliance with medications     Pt has been down for 2 days, has not eaten, vitals are concerning for potential sepsis,  cannot take care of patient in this condition - their daughter Maris was consulted due to pt former wishes of not being hospitalized which has prevented earlier trips to the hospital  but due to the concern of possible infection, family agreed for pt to go to the hospital for evaluation and treatment of infection.     I discussed with  that it is time that pt moved to [place] with daughter pending her ability to walk after hospitalization.     Plan:     Problem List Items Addressed This Visit        Cardiac/Vascular    Hypertension (Chronic)     Low and not on medications   Concerned for sepsis          Chronic diastolic congestive heart failure - Primary (Chronic)     Initially discussed hospice care but after discussion with family, opt to send pt to ER to make sure no reversible cause for pt AMS and falls.   I am concerning for CHF exacerbation given wheezing on exam.          Venous stasis dermatitis of both lower extremities     Significant swelling with blisters, erythematous rash not unchanged from last visit but could be source of infection    hospital evaluation for diuresis             Orthopedic    Fall     Pt unable to get up  Need labs to rule out rhadomyolysis and any other source of infection   If pt unable to ambulate, will need another goal of care                Health Maintenance       Date Due Completion Date    Lipid Panel 11/27/2019 11/27/2014    Influenza Vaccine (1) 08/01/2020 10/10/2019    TETANUS VACCINE 09/01/2021 (Originally 3/8/1947) ---    Shingles Vaccine (1 of 2) 09/01/2021  (Originally 3/8/1979) ---          No follow-ups on file. . Total face-to-face time was 60 min, greater than 50% of this was spent on counseling and coordination of care. The following issues were discussed: fall, chf, venous stasis dermatitis     Rachel Millard MD  Internal Medicine- Geriatrics  [facility] [place]

## 2020-10-26 NOTE — ED NOTES
reported fall yesterday to EMS. He stated to EMS that was unable to assist her off the floor, and she did not want to be hospitalized, so they did not call until today when their daughter intervened. Patient presents with altered mental status; follows commands and has clear speech, but is currently confused and oriented only to self. Patient is noncompliant with lasix and has severe bilateral LE edema. Skin breakdown noted to rectal area and to L buttocks.

## 2020-10-26 NOTE — ASSESSMENT & PLAN NOTE
Significant swelling with blisters, erythematous rash not unchanged from last visit but could be source of infection    hospital evaluation for diuresis

## 2020-10-26 NOTE — SUBJECTIVE & OBJECTIVE
Past Medical History:   Diagnosis Date    Arthritis     Asthma     recent bronchits treated and resoved with Levaquin. Last 2011.    Cataract     ou done//    Diabetes mellitus     diet controlled    GERD (gastroesophageal reflux disease)     Hypertension     Phlebitis and thrombophlebitis of the leg  before    Skin lesion of hand        Past Surgical History:   Procedure Laterality Date    CATARACT EXTRACTION      os d 13// od d 14//    EYE SURGERY      both eyes    JOINT REPLACEMENT      bilateral knee replacement       Review of patient's allergies indicates:   Allergen Reactions    No known drug allergies        No current facility-administered medications on file prior to encounter.      Current Outpatient Medications on File Prior to Encounter   Medication Sig    ergocalciferol (ERGOCALCIFEROL) 50,000 unit Cap Take 1 capsule (50,000 Units total) by mouth every 7 days. For vitamin D    furosemide (LASIX) 20 MG tablet Take 1 tablet (20 mg total) by mouth once daily. For leg swelling (Patient not taking: Reported on 2020)     Family History     Problem Relation (Age of Onset)    Heart disease Father    Stroke Mother        Tobacco Use    Smoking status: Former Smoker     Packs/day: 1.50     Years: 50.00     Pack years: 75.00     Types: Cigarettes     Quit date: 1990     Years since quittin.9   Substance and Sexual Activity    Alcohol use: Yes     Alcohol/week: 28.0 standard drinks     Types: 14 Glasses of wine, 14 Cans of beer per week     Frequency: 4 or more times a week     Drinks per session: 3 or 4     Binge frequency: Daily or almost daily     Comment: 1-3 glasses of wine and/or beer daily    Drug use: No    Sexual activity: Not Currently       Review of systems limited secondary to patient's dementia  Review of Systems   Unable to perform ROS: Dementia   Constitutional: Positive for chills and fatigue. Negative for fever.   Respiratory: Positive for cough  and shortness of breath.    Cardiovascular: Positive for chest pain, palpitations and leg swelling.   Gastrointestinal: Positive for abdominal pain.     Objective:     Vital Signs (Most Recent):  Temp: 100.2 °F (37.9 °C) (10/26/20 1230)  Pulse: 100 (10/26/20 1731)  Resp: 16 (10/26/20 1731)  BP: (!) 142/78 (10/26/20 1731)  SpO2: (!) 93 % (10/26/20 1731) Vital Signs (24h Range):  Temp:  [98.7 °F (37.1 °C)-100.2 °F (37.9 °C)] 100.2 °F (37.9 °C)  Pulse:  [] 100  Resp:  [12-25] 16  SpO2:  [92 %-100 %] 93 %  BP: ()/(48-78) 142/78     Weight: 81.6 kg (180 lb)  Body mass index is 35.15 kg/m².    Physical Exam  Vitals signs and nursing note reviewed.   Constitutional:       Appearance: She is well-developed. She is not toxic-appearing or diaphoretic.      Comments: eldery   HENT:      Head: Normocephalic and atraumatic.      Nose: No congestion.      Comments: Nasal canula     Mouth/Throat:      Mouth: Mucous membranes are dry.   Eyes:      General:         Right eye: Discharge present.         Left eye: Discharge present.     Conjunctiva/sclera: Conjunctivae normal.      Pupils: Pupils are equal, round, and reactive to light.   Neck:      Musculoskeletal: Normal range of motion and neck supple.      Thyroid: No thyromegaly.      Vascular: No JVD.   Cardiovascular:      Rate and Rhythm: Normal rate and regular rhythm.      Heart sounds: Normal heart sounds. No murmur. No friction rub. No gallop.    Pulmonary:      Effort: Pulmonary effort is normal.      Breath sounds: Normal breath sounds. No wheezing or rales.   Abdominal:      General: Bowel sounds are normal. There is no distension.      Palpations: Abdomen is soft.      Tenderness: There is no abdominal tenderness. There is no right CVA tenderness, left CVA tenderness or guarding.   Musculoskeletal: Normal range of motion.         General: Swelling and deformity present.      Right lower leg: Edema present.      Left lower leg: Edema present.   Skin:      General: Skin is warm and dry.   Neurological:      General: No focal deficit present.      Mental Status: She is alert.      Deep Tendon Reflexes: Reflexes are normal and symmetric.      Comments: Oriented to person, place  States the year is 1920   Psychiatric:         Mood and Affect: Mood normal.         Behavior: Behavior normal.         Significant Labs:   CBC:   Recent Labs   Lab 10/26/20  1529   WBC 9.37   HGB 12.5   HCT 38.9   *     CMP:   Recent Labs   Lab 10/26/20  1315      K 5.0      CO2 25   *   BUN 23   CREATININE 0.8   CALCIUM 9.4   PROT 7.0   ALBUMIN 2.9*   BILITOT 3.0*   ALKPHOS 50*   *   ALT 29   ANIONGAP 10   EGFRNONAA >60.0     Cardiac Markers:   Recent Labs   Lab 10/26/20  1315   BNP 1,489*     Coagulation:   Recent Labs   Lab 10/26/20  1315   INR 1.5     Lactic Acid:   Recent Labs   Lab 10/26/20  1311 10/26/20  1530   LACTATE 3.5* 3.0*     Magnesium:   Recent Labs   Lab 10/26/20  1315   MG 1.9     Troponin:   Recent Labs   Lab 10/26/20  1315   TROPONINI 2.850*     Urine Studies:   Recent Labs   Lab 10/26/20  1535   COLORU Yellow   APPEARANCEUA Hazy*   PHUR 6.0   SPECGRAV 1.030   PROTEINUA 1+*   GLUCUA Negative   KETONESU Trace*   BILIRUBINUA 1+*   OCCULTUA Negative   NITRITE Negative   UROBILINOGEN 4.0-6.0*   LEUKOCYTESUR 2+*   RBCUA 2   WBCUA 9*   BACTERIA Many*   SQUAMEPITHEL 15   HYALINECASTS 55*     All pertinent labs within the past 24 hours have been reviewed.    Significant Imaging: I have reviewed all pertinent imaging results/findings within the past 24 hours.   Imaging Results          US Abdomen Limited (In process)                CT Abdomen Pelvis With Contrast (Final result)  Result time 10/26/20 16:37:28    Final result by Sourav Esparza MD (10/26/20 16:37:28)                 Narrative:    CMS MANDATED QUALITY DATA-CT RADIATION DOSE-436    All CT scans at this facility use dose modulation, iterative  reconstruction, and or weight-based dosing  when appropriate to reduce  radiation dose to as low as reasonably achievable.    HISTORY: Acute abdominal pain, fever.    FINDINGS: Axial postcontrast imaging was performed with 100 mL  Omnipaque 350 IV contrast. Comparison to multiple prior exams.    CT ABDOMEN: Scattered reticulonodular densities at both lung bases are  nonspecific, with no pleural effusion. The heart is enlarged, with  dense mitral annular calcifications and small sliding-type hiatal  hernia.    The liver demonstrates diffuse heterogeneous parenchymal enhancement,  with a couple of ill-defined hyperenhancing areas in the right hepatic  lobe potentially vascular shunting, nonspecific. There are multiple  calcifications in the gallbladder suggesting gallstones, with no  pericholecystic inflammation or fluid. The common hepatic duct is  dilated measuring 11 mm in diameter.    The spleen is normal in size and enhances normally, with the pancreas,  adrenal glands and kidneys enhancing normally. No renal calculi or  hydronephrosis. Scattered calcified plaque involves normal caliber  abdominal aorta and iliac arteries, with no mesenteric or  retroperitoneal lymph node enlargement. There is no bowel wall  thickening, inflammation, or bowel obstruction, with scattered colon  diverticula, without evidence of acute diverticulitis. There is no  ascites or intraperitoneal free air.    CT PELVIS: Large volume of stool lies in the rectum, with no  colorectal wall thickening or inflammation. There is a Barraza catheter  and air within collapsed urinary bladder, with right lateral wall  bladder diverticulum. There is no pelvic free fluid, with no pelvic or  inguinal lymph node enlargement.    The extraperitoneal soft tissues enhance normally. There are no acute  osseous abnormalities, with intervertebral disc space narrowing and  facet arthropathy in the lumbar spine. No fractures or destructive  osseous lesions. The bones are diffusely  osteopenic.    IMPRESSION:  1. No acute findings in the abdomen or pelvis to help explain the  patient's symptoms.  2. Additional observations as above.    Electronically Signed by Sourav JOHNSON on 10/26/2020 4:47 PM                             CT Head Without Contrast (In process)                X-Ray Hip 2 View Left (Final result)  Result time 10/26/20 12:12:51    Final result by Donnie Ivy MD (10/26/20 12:12:51)                 Narrative:    CLINICAL HISTORY:  89 years (3/8/1929) Female fall    TECHNIQUE:  XR HIP 2 VIEW LEFT. 2 view(s) obtained.    COMPARISON:  None available.    FINDINGS:  There is diffusely decreased osseous mineralization (suggesting  osteoporosis/osteopenia) which limits evaluation for subtle fractures,  that being said no acute displaced fracture or dislocation is seen.  The hip joint appears to be congruent. The pubic symphysis and SI  joints are maintained. There is soft tissue swelling over the lateral  aspect of the thigh with no radiopaque foreign body seen.    IMPRESSION:  No acute fracture or dislocation.    Electronically Signed by MONA Nur on 10/26/2020 12:17 PM                             X-Ray Pelvis 3 view inc Hip 2 view Right (Final result)  Result time 10/26/20 11:38:44   Procedure changed from X-Ray Hip 2 View Right     Final result by Sourav Esparza MD (10/26/20 11:38:44)                 Narrative:    HISTORY: Pelvic pain, right hip pain, post trauma sustained  in a  fall.    FINDINGS: AP pelvis and 3 views of the right hip show no acute  fracture, dislocation or destructive osseous lesion. There is  degenerative narrowing of both hip joint spaces, with narrowing of the  symphysis pubis. The bones are diffusely osteopenic. There are  scattered peripheral arterial vascular calcifications.    IMPRESSION: Negative for acute fracture or dislocation.    Electronically Signed by Sourav JOHNSON on 10/26/2020 11:43 AM                             X-Ray  Chest AP Portable (Final result)  Result time 10/26/20 11:36:44    Final result by Sourav Esparza MD (10/26/20 11:36:44)                 Narrative:    HISTORY: Acute dyspnea, CHF.    FINDINGS: Portable chest radiograph at 1117 hours compared to  11/13/2018 shows the cardiac silhouette is enlarged, stable in size,  with the pulmonary vasculature stable and within normal limits. There  are aortic vascular calcifications and dense mitral annular  calcifications.    The lungs are symmetrically expanded, with scattered coarse  reticulonodular densities throughout both lungs, similar to the prior  exam. There is unchanged blunting of the costophrenic angles,  nonspecific, with no large pleural effusion or evidence of shady  interstitial pulmonary edema. No pneumothorax.    The bones are diffusely osteopenic, with intervertebral disc space  narrowing in the spine. No acute fractures identified.    IMPRESSION: Stable cardiomegaly and scattered chronic interstitial  opacities, with no definite evidence of acute cardiopulmonary disease.    Electronically Signed by Sourav JOHNSON on 10/26/2020 11:42 AM

## 2020-10-26 NOTE — HPI
89-year-old female history of hypertension, hyperlipidemia, NSTEMI, prior multiple hospitalizations for sepsis due to cellulitis or UTI, chronic diastolic dysfunction, interstitial fibrosis, brought in to hospital today  or concerns of sepsis after being found on ground by PCP during home visit.  Patient poor historian.  States that she feels okay and has no specific complaints other than feeling cold and tired.  She denies chest pain, palpitations, shortness of breath, cough, abdominal pain, nausea, vomiting, fever, chills.  She does not know how she got to the ground.     Per primary care physician progress note dated today, patient was on the floor for 2 days without eating and only minimal fluid intake.  She most slept over those 2 days.  Patient and family were initially hesitant to go to the hospital PCP offered hospice services however family decided to bring to ED for evaluation of reversible causes of the patient's fatigue and confusion.    Spoke patient's daughter Maris who states that on Thursday patient had fall to ground and paramedics were called.  At that time she blood pressures 80s over 50s but improved prior to EMS departure.  Patient declined transport to hospital.  Home visit was arranged with her primary care doctor.  Maris states that patient did not fall during this episode.  She slid to the ground with assistance  and they thought it would be best if she remained on the ground to rest and avoid future falls.  He put a pad and blankets on the ground for her.  Daughter also states that patient would like to be DNR.  Discussed code status with is April who states she would not want she is to keep her alive.    Spoke to ED physician Dr. Trujillo who states that patient had mildly low blood pressures 90s over 50s on ED arrival that improved with bolus of fluid and has been stable since.  He states she was covered in urine and feces.  UA consistent with urine infection.  CT abdomen showed  questionable gallstones dilated common bile duct so he states that he ordered ultrasound for further evaluation.

## 2020-10-26 NOTE — ASSESSMENT & PLAN NOTE
Pt unable to get up  Need labs to rule out rhadomyolysis and any other source of infection   If pt unable to ambulate, will need another goal of care

## 2020-10-26 NOTE — ASSESSMENT & PLAN NOTE
Initially discussed hospice care but after discussion with family, opt to send pt to ER to make sure no reversible cause for pt AMS and falls.   I am concerning for CHF exacerbation given wheezing on exam.

## 2020-10-26 NOTE — H&P
[facility] Medicine  History & Physical    Patient Name: Fracisco Pires  MRN: [id number]  Admission Date: 10/26/2020  Attending Physician: Oracio Trujillo MD   Primary Care Provider: Rachel Millard MD         Patient information was obtained from patient, relative(s), past medical records and ER records.     Subjective:     Principal Problem:Sepsis    Chief Complaint:   Chief Complaint   Patient presents with    Altered Mental Status    Abnormal ECG    Congestive Heart Failure     non-compliant with Lasix    Fall        HPI: 89-year-old female history of hypertension, hyperlipidemia, NSTEMI, prior multiple hospitalizations for sepsis due to cellulitis or UTI, chronic diastolic dysfunction, interstitial fibrosis, brought in to hospital today  or concerns of sepsis after being found on ground by PCP during home visit.  Patient poor historian.  States that she feels okay and has no specific complaints other than feeling cold and tired.  She denies chest pain, palpitations, shortness of breath, cough, abdominal pain, nausea, vomiting, fever, chills.  She does not know how she got to the ground.     Per primary care physician progress note dated today, patient was on the floor for 2 days without eating and only minimal fluid intake.  She most slept over those 2 days.  Patient and family were initially hesitant to go to the hospital PCP offered hospice services however family decided to bring to ED for evaluation of reversible causes of the patient's fatigue and confusion.    Spoke patient's daughter Marsi who states that on Thursday patient had fall to ground and paramedics were called.  At that time she blood pressures 80s over 50s but improved prior to EMS departure.  Patient declined transport to hospital.  Home visit was arranged with her primary care doctor.  Maris states that patient did not fall during this episode.  She slid to the ground with assistance  and they thought it  would be best if she remained on the ground to rest and avoid future falls.  He put a pad and blankets on the ground for her.  Daughter also states that patient would like to be DNR.  Discussed code status with is April who states she would not want she is to keep her alive.    Spoke to ED physician Dr. Trujillo who states that patient had mildly low blood pressures 90s over 50s on ED arrival that improved with bolus of fluid and has been stable since.  He states she was covered in urine and feces.  UA consistent with urine infection.  CT abdomen showed questionable gallstones dilated common bile duct so he states that he ordered ultrasound for further evaluation.         Past Medical History:   Diagnosis Date    Arthritis     Asthma     recent bronchits treated and resoved with Levaquin. Last Nov 2011.    Cataract     ou done//    Diabetes mellitus     diet controlled    GERD (gastroesophageal reflux disease)     Hypertension     Phlebitis and thrombophlebitis of the leg 2005 before    Skin lesion of hand        Past Surgical History:   Procedure Laterality Date    CATARACT EXTRACTION      os d 11/6/13// od d 4/16/14//    EYE SURGERY      both eyes    JOINT REPLACEMENT      bilateral knee replacement       Review of patient's allergies indicates:   Allergen Reactions    No known drug allergies        No current facility-administered medications on file prior to encounter.      Current Outpatient Medications on File Prior to Encounter   Medication Sig    ergocalciferol (ERGOCALCIFEROL) 50,000 unit Cap Take 1 capsule (50,000 Units total) by mouth every 7 days. For vitamin D    furosemide (LASIX) 20 MG tablet Take 1 tablet (20 mg total) by mouth once daily. For leg swelling (Patient not taking: Reported on 9/1/2020)     Family History     Problem Relation (Age of Onset)    Heart disease Father    Stroke Mother        Tobacco Use    Smoking status: Former Smoker     Packs/day: 1.50     Years: 50.00     Pack  years: 75.00     Types: Cigarettes     Quit date: 1990     Years since quittin.9   Substance and Sexual Activity    Alcohol use: Yes     Alcohol/week: 28.0 standard drinks     Types: 14 Glasses of wine, 14 Cans of beer per week     Frequency: 4 or more times a week     Drinks per session: 3 or 4     Binge frequency: Daily or almost daily     Comment: 1-3 glasses of wine and/or beer daily    Drug use: No    Sexual activity: Not Currently       Review of systems limited secondary to patient's dementia  Review of Systems   Unable to perform ROS: Dementia   Constitutional: Positive for chills and fatigue. Negative for fever.   Respiratory: Positive for cough and shortness of breath.    Cardiovascular: Positive for chest pain, palpitations and leg swelling.   Gastrointestinal: Positive for abdominal pain.     Objective:     Vital Signs (Most Recent):  Temp: 100.2 °F (37.9 °C) (10/26/20 1230)  Pulse: 100 (10/26/20 1731)  Resp: 16 (10/26/20 173)  BP: (!) 142/78 (10/26/20 1731)  SpO2: (!) 93 % (10/26/20 173) Vital Signs (24h Range):  Temp:  [98.7 °F (37.1 °C)-100.2 °F (37.9 °C)] 100.2 °F (37.9 °C)  Pulse:  [] 100  Resp:  [12-25] 16  SpO2:  [92 %-100 %] 93 %  BP: ()/(48-78) 142/78     Weight: 81.6 kg (180 lb)  Body mass index is 35.15 kg/m².    Physical Exam  Vitals signs and nursing note reviewed.   Constitutional:       Appearance: She is well-developed. She is not toxic-appearing or diaphoretic.      Comments: eldery   HENT:      Head: Normocephalic and atraumatic.      Nose: No congestion.      Comments: Nasal canula     Mouth/Throat:      Mouth: Mucous membranes are dry.   Eyes:      General:         Right eye: Discharge present.         Left eye: Discharge present.     Conjunctiva/sclera: Conjunctivae normal.      Pupils: Pupils are equal, round, and reactive to light.   Neck:      Musculoskeletal: Normal range of motion and neck supple.      Thyroid: No thyromegaly.      Vascular: No JVD.    Cardiovascular:      Rate and Rhythm: Normal rate and regular rhythm.      Heart sounds: Normal heart sounds. No murmur. No friction rub. No gallop.    Pulmonary:      Effort: Pulmonary effort is normal.      Breath sounds: Normal breath sounds. No wheezing or rales.   Abdominal:      General: Bowel sounds are normal. There is no distension.      Palpations: Abdomen is soft.      Tenderness: There is no abdominal tenderness. There is no right CVA tenderness, left CVA tenderness or guarding.   Musculoskeletal: Normal range of motion.         General: Swelling and deformity present.      Right lower leg: Edema present.      Left lower leg: Edema present.   Skin:     General: Skin is warm and dry.   Neurological:      General: No focal deficit present.      Mental Status: She is alert.      Deep Tendon Reflexes: Reflexes are normal and symmetric.      Comments: Oriented to person, place  States the year is 1920   Psychiatric:         Mood and Affect: Mood normal.         Behavior: Behavior normal.         Significant Labs:   CBC:   Recent Labs   Lab 10/26/20  1529   WBC 9.37   HGB 12.5   HCT 38.9   *     CMP:   Recent Labs   Lab 10/26/20  1315      K 5.0      CO2 25   *   BUN 23   CREATININE 0.8   CALCIUM 9.4   PROT 7.0   ALBUMIN 2.9*   BILITOT 3.0*   ALKPHOS 50*   *   ALT 29   ANIONGAP 10   EGFRNONAA >60.0     Cardiac Markers:   Recent Labs   Lab 10/26/20  1315   BNP 1,489*     Coagulation:   Recent Labs   Lab 10/26/20  1315   INR 1.5     Lactic Acid:   Recent Labs   Lab 10/26/20  1311 10/26/20  1530   LACTATE 3.5* 3.0*     Magnesium:   Recent Labs   Lab 10/26/20  1315   MG 1.9     Troponin:   Recent Labs   Lab 10/26/20  1315   TROPONINI 2.850*     Urine Studies:   Recent Labs   Lab 10/26/20  1535   COLORU Yellow   APPEARANCEUA Hazy*   PHUR 6.0   SPECGRAV 1.030   PROTEINUA 1+*   GLUCUA Negative   KETONESU Trace*   BILIRUBINUA 1+*   OCCULTUA Negative   NITRITE Negative   UROBILINOGEN  4.0-6.0*   LEUKOCYTESUR 2+*   RBCUA 2   WBCUA 9*   BACTERIA Many*   SQUAMEPITHEL 15   HYALINECASTS 55*     All pertinent labs within the past 24 hours have been reviewed.    Significant Imaging: I have reviewed all pertinent imaging results/findings within the past 24 hours.   Imaging Results          US Abdomen Limited (In process)                CT Abdomen Pelvis With Contrast (Final result)  Result time 10/26/20 16:37:28    Final result by Sourav Esparza MD (10/26/20 16:37:28)                 Narrative:    CMS MANDATED QUALITY DATA-CT RADIATION DOSE-436    All CT scans at this facility use dose modulation, iterative  reconstruction, and or weight-based dosing when appropriate to reduce  radiation dose to as low as reasonably achievable.    HISTORY: Acute abdominal pain, fever.    FINDINGS: Axial postcontrast imaging was performed with 100 mL  Omnipaque 350 IV contrast. Comparison to multiple prior exams.    CT ABDOMEN: Scattered reticulonodular densities at both lung bases are  nonspecific, with no pleural effusion. The heart is enlarged, with  dense mitral annular calcifications and small sliding-type hiatal  hernia.    The liver demonstrates diffuse heterogeneous parenchymal enhancement,  with a couple of ill-defined hyperenhancing areas in the right hepatic  lobe potentially vascular shunting, nonspecific. There are multiple  calcifications in the gallbladder suggesting gallstones, with no  pericholecystic inflammation or fluid. The common hepatic duct is  dilated measuring 11 mm in diameter.    The spleen is normal in size and enhances normally, with the pancreas,  adrenal glands and kidneys enhancing normally. No renal calculi or  hydronephrosis. Scattered calcified plaque involves normal caliber  abdominal aorta and iliac arteries, with no mesenteric or  retroperitoneal lymph node enlargement. There is no bowel wall  thickening, inflammation, or bowel obstruction, with scattered colon  diverticula, without  evidence of acute diverticulitis. There is no  ascites or intraperitoneal free air.    CT PELVIS: Large volume of stool lies in the rectum, with no  colorectal wall thickening or inflammation. There is a Barraza catheter  and air within collapsed urinary bladder, with right lateral wall  bladder diverticulum. There is no pelvic free fluid, with no pelvic or  inguinal lymph node enlargement.    The extraperitoneal soft tissues enhance normally. There are no acute  osseous abnormalities, with intervertebral disc space narrowing and  facet arthropathy in the lumbar spine. No fractures or destructive  osseous lesions. The bones are diffusely osteopenic.    IMPRESSION:  1. No acute findings in the abdomen or pelvis to help explain the  patient's symptoms.  2. Additional observations as above.    Electronically Signed by Sourav JOHNSON on 10/26/2020 4:47 PM                             CT Head Without Contrast (In process)                X-Ray Hip 2 View Left (Final result)  Result time 10/26/20 12:12:51    Final result by Donnie Ivy MD (10/26/20 12:12:51)                 Narrative:    CLINICAL HISTORY:  89 years (3/8/1929) Female fall    TECHNIQUE:  XR HIP 2 VIEW LEFT. 2 view(s) obtained.    COMPARISON:  None available.    FINDINGS:  There is diffusely decreased osseous mineralization (suggesting  osteoporosis/osteopenia) which limits evaluation for subtle fractures,  that being said no acute displaced fracture or dislocation is seen.  The hip joint appears to be congruent. The pubic symphysis and SI  joints are maintained. There is soft tissue swelling over the lateral  aspect of the thigh with no radiopaque foreign body seen.    IMPRESSION:  No acute fracture or dislocation.    Electronically Signed by MONA Nur on 10/26/2020 12:17 PM                             X-Ray Pelvis 3 view inc Hip 2 view Right (Final result)  Result time 10/26/20 11:38:44   Procedure changed from X-Ray Hip 2 View  Right     Final result by Sourav Esparza MD (10/26/20 11:38:44)                 Narrative:    HISTORY: Pelvic pain, right hip pain, post trauma sustained  in a  fall.    FINDINGS: AP pelvis and 3 views of the right hip show no acute  fracture, dislocation or destructive osseous lesion. There is  degenerative narrowing of both hip joint spaces, with narrowing of the  symphysis pubis. The bones are diffusely osteopenic. There are  scattered peripheral arterial vascular calcifications.    IMPRESSION: Negative for acute fracture or dislocation.    Electronically Signed by Sourav JOHNSON on 10/26/2020 11:43 AM                             X-Ray Chest AP Portable (Final result)  Result time 10/26/20 11:36:44    Final result by Sourav Esparza MD (10/26/20 11:36:44)                 Narrative:    HISTORY: Acute dyspnea, CHF.    FINDINGS: Portable chest radiograph at 1117 hours compared to  11/13/2018 shows the cardiac silhouette is enlarged, stable in size,  with the pulmonary vasculature stable and within normal limits. There  are aortic vascular calcifications and dense mitral annular  calcifications.    The lungs are symmetrically expanded, with scattered coarse  reticulonodular densities throughout both lungs, similar to the prior  exam. There is unchanged blunting of the costophrenic angles,  nonspecific, with no large pleural effusion or evidence of shady  interstitial pulmonary edema. No pneumothorax.    The bones are diffusely osteopenic, with intervertebral disc space  narrowing in the spine. No acute fractures identified.    IMPRESSION: Stable cardiomegaly and scattered chronic interstitial  opacities, with no definite evidence of acute cardiopulmonary disease.    Electronically Signed by Sourav JOHNSON on 10/26/2020 11:42 AM                                Assessment/Plan:     Active Hospital Problems    Diagnosis  POA    *Sepsis(995.91) [A41.9]  Yes     Dx updated per 2019 IMO Load      Non-traumatic  rhabdomyolysis [M62.82]  Yes    Fall [W19.XXXA]  Yes    Acute cystitis [N30.00]  Yes    Venous stasis dermatitis of both lower extremities [I87.2]  Yes    Essential hypertension [I10]  Yes    Noncompliance with medication regimen [Z91.14]  Not Applicable    ILD (interstitial lung disease) [J84.9]  Yes    Excessive tear production of both lacrimal glands [H04.203]  Yes    Bilateral lower extremity edema [R60.0]  Yes    Debility [R53.81]  Yes    CKD (chronic kidney disease) stage 3, GFR 30-59 ml/min [N18.30]  Yes     Chronic    Chronic diastolic congestive heart failure [I50.32]  Yes     Chronic    Sensorineural hearing loss (SNHL) of both ears [H90.3]  Yes     Chronic    Complete left bundle branch block [I44.7]  Yes    Hypertension [I10]  Yes     Chronic      Resolved Hospital Problems   No resolved problems to display.       Sepsis 2/2 UTI vs cellulites vs multifactorial  Admit to telemetry   Not in shock.   SIRS (+) on admission (HR, RR) with source UTI  LA 3.5 --> 3.0  Will trend until LA within normal limits  Procal pending  UA:  Hazy, 2+LE, many bacteria  IVABx started in ED: Zosyn will continue and add vanc  IVF: 1L bolus then continued at 130 cc/hr. Monitor closely for signs of volume overload  Blood, urine, resp cx pending  Wound care consulted      Suspected Rhabdo  Patient on ground for 2 days  CPK elevated 1796  trending  Gentle hydration  Bun/cr stable on admission  Monitor uop, strict in out      Diastolic heart failure  Elevated trop  bnp 1489  Trop 2.850  no chest pain   trending  Echo ordered   Take 40mg lasix most days (daughter states family often hides it in her food)         Debility  Homebound  Hospice being considered by family with referral to [facility] by PCP  Case management consulted for hospice  PT/OT  Fall precautions    Code DNR  Diet: dysphagia diet, advance as tolerated   Gi ppx: protonix  dvt ppx: lovenox       VTE Risk Mitigation (From admission, onward)    None              Jackie Manley DO  Department of Hospital Medicine   [facility]

## 2020-10-26 NOTE — ED PROVIDER NOTES
Encounter Date: 10/26/2020       History     Chief Complaint   Patient presents with    Altered Mental Status    Abnormal ECG    Congestive Heart Failure     non-compliant with Lasix    Fall     89-year-old brought to the emergency department via EMS according to the paramedics the patient had a home visit by a provider today and she was found on the floor according to the patient's  he reports that the patient fell the day prior patient currently has no complaints she is very disheveled.  She is awake and alert         Review of patient's allergies indicates:   Allergen Reactions    No known drug allergies      Past Medical History:   Diagnosis Date    Arthritis     Asthma     recent bronchits treated and resoved with Levaquin. Last 2011.    Cataract     ou done//    Diabetes mellitus     diet controlled    GERD (gastroesophageal reflux disease)     Hypertension     Phlebitis and thrombophlebitis of the leg  before    Skin lesion of hand      Past Surgical History:   Procedure Laterality Date    CATARACT EXTRACTION      os d 13// od d 14//    EYE SURGERY      both eyes    JOINT REPLACEMENT      bilateral knee replacement     Family History   Problem Relation Age of Onset    Stroke Mother     Heart disease Father     Amblyopia Neg Hx     Blindness Neg Hx     Cancer Neg Hx     Cataracts Neg Hx     Diabetes Neg Hx     Glaucoma Neg Hx     Hypertension Neg Hx     Macular degeneration Neg Hx     Retinal detachment Neg Hx     Strabismus Neg Hx     Thyroid disease Neg Hx      Social History     Tobacco Use    Smoking status: Former Smoker     Packs/day: 1.50     Years: 50.00     Pack years: 75.00     Types: Cigarettes     Quit date: 1990     Years since quittin.9   Substance Use Topics    Alcohol use: Yes     Alcohol/week: 28.0 standard drinks     Types: 14 Glasses of wine, 14 Cans of beer per week     Frequency: 4 or more times a week     Drinks per  session: 3 or 4     Binge frequency: Daily or almost daily     Comment: 1-3 glasses of wine and/or beer daily    Drug use: No     Review of Systems   HENT: Negative.    Respiratory: Negative.    Cardiovascular: Negative.    Gastrointestinal: Negative.    Endocrine: Negative.    Genitourinary: Negative.    Musculoskeletal: Negative.    Skin: Negative.    Neurological: Negative.    Hematological: Negative.    Psychiatric/Behavioral: Negative.    All other systems reviewed and are negative.      Physical Exam     Initial Vitals [10/26/20 1049]   BP Pulse Resp Temp SpO2   127/72 (!) 117 (!) 25 98.7 °F (37.1 °C) (!) 92 %      MAP       --         Physical Exam    Constitutional: She is Obese . She appears ill.   HENT:   Head: Normocephalic and atraumatic.   Eyes: EOM are normal. Pupils are equal, round, and reactive to light. Right eye exhibits discharge. Left eye exhibits discharge. Scleral icterus is present.   Neck: Normal range of motion. Neck supple.   Cardiovascular: An irregularly irregular rhythm present.    Pulmonary/Chest: Breath sounds normal. No respiratory distress.   Abdominal: Soft. Bowel sounds are normal. There is no abdominal tenderness. There is no rebound and no guarding.   Yeast under pendulous abdomen,    Musculoskeletal: Edema present.      Comments: Tenderness elicited to left hip   Neurological: She is oriented to person, place, and time.   Skin:   Small decubitus to buttock         ED Course   Procedures  Labs Reviewed   CBC W/ AUTO DIFFERENTIAL - Abnormal; Notable for the following components:       Result Value    RBC 3.70 (*)     Mean Corpuscular Volume 105 (*)     Mean Corpuscular Hemoglobin 33.8 (*)     RDW 14.6 (*)     Platelets 133 (*)     Immature Grans (Abs) 0.05 (*)     Gran% 77.6 (*)     Lymph% 13.1 (*)     All other components within normal limits    Narrative:     Recoll. [id number] by TALHA at 10/26/2020 13:48, reason:   clotted--talked to gema/er for recollect   COMPREHENSIVE  METABOLIC PANEL - Abnormal; Notable for the following components:    Glucose 127 (*)     Albumin 2.9 (*)     Total Bilirubin 3.0 (*)     Alkaline Phosphatase 50 (*)      (*)     All other components within normal limits   TROPONIN I - Abnormal; Notable for the following components:    Troponin I 2.850 (*)     All other components within normal limits    Narrative:     Trop critical result(s) repeated. Called and verbal readback obtained   from Karri Bonilla RN/ED by [facility] 10/26/2020 14:31   B-TYPE NATRIURETIC PEPTIDE - Abnormal; Notable for the following components:    BNP 1,489 (*)     All other components within normal limits   PROTIME-INR - Abnormal; Notable for the following components:    PT 17.0 (*)     All other components within normal limits   CK - Abnormal; Notable for the following components:    CPK 1796 (*)     All other components within normal limits   LACTIC ACID, PLASMA - Abnormal; Notable for the following components:    Lactate (Lactic Acid) 3.0 (*)     All other components within normal limits    Narrative:     LA critical result(s) repeated. Called and verbal readback obtained   from Dr. Oracio Trujillo/ED by [facility] 10/26/2020 16:27   URINALYSIS, REFLEX TO URINE CULTURE - Abnormal; Notable for the following components:    Appearance, UA Hazy (*)     Protein, UA 1+ (*)     Ketones, UA Trace (*)     Bilirubin (UA) 1+ (*)     Urobilinogen, UA 4.0-6.0 (*)     Leukocytes, UA 2+ (*)     All other components within normal limits    Narrative:     Specimen Source->Urine   LACTIC ACID, PLASMA - Abnormal; Notable for the following components:    Lactate (Lactic Acid) 3.5 (*)     All other components within normal limits    Narrative:     LA critical result(s) repeated. Called and verbal readback obtained   from Karri Bonilla RN/ED by [facility] 10/26/2020 14:32   URINALYSIS MICROSCOPIC - Abnormal; Notable for the following components:    WBC, UA 9 (*)     Bacteria Many (*)     Hyaline Casts, UA 55 (*)     All other  components within normal limits    Narrative:     Specimen Source->Urine   MAGNESIUM   PHOSPHORUS   SARS-COV-2 RNA AMPLIFICATION, QUAL   AMMONIA        ECG Results          EKG 12-lead (In process)  Result time 10/26/20 11:23:10    In process by Interface, Lab In [facility] (10/26/20 11:23:10)                 Narrative:    Test Reason : R06.02,    Vent. Rate : 120 BPM     Atrial Rate : 120 BPM     P-R Int : 184 ms          QRS Dur : 156 ms      QT Int : 342 ms       P-R-T Axes : 012 -44 127 degrees     QTc Int : 483 ms    Sinus tachycardia with Premature atrial complexes with Aberrant conduction  Left axis deviation  Left bundle branch block  Abnormal ECG  No previous ECGs available    Referred By: [email or web address]   SELF           Confirmed By:                             Imaging Results          US Abdomen Limited (Final result)  Result time 10/26/20 18:27:28    Final result by Osmani Queen MD (10/26/20 18:27:28)                 Impression:      1. Cholelithiasis.  2. Suboptimal visualization of the main portal vein as above.  Portal vein does appear patent when correlated with 10/26/2020 CT.      Electronically signed by: Osmani Queen MD  Date:    10/26/2020  Time:    18:27             Narrative:    EXAMINATION:  US ABDOMEN LIMITED    CLINICAL HISTORY:  elevated liver enzymes;    COMPARISON:  CT 10/26/2020    FINDINGS:  Liver maintains normal echogenicity without focal mass or intrahepatic bile duct dilation. Hepatopetal flow identified in main portal vein, although portal vein visualization is limited due to overlying bowel gas.    Cholelithiasis lie in gallbladder.  Sonographic Barriga sign is negative.  No gallbladder wall thickening or pericholecystic fluid.  Common duct diameter of 4 mm is normal.    Bowel gas obscures the pancreas.  Right kidney is free of hydronephrosis. Bowel gas obscures the abdominal aorta.  Juxtahepatic IVC is unremarkable.                               CT Abdomen Pelvis With Contrast (Final  result)  Result time 10/26/20 16:37:28    Final result by Sourav Esparza MD (10/26/20 16:37:28)                 Narrative:    CMS MANDATED QUALITY DATA-CT RADIATION DOSE-436    All CT scans at this facility use dose modulation, iterative  reconstruction, and or weight-based dosing when appropriate to reduce  radiation dose to as low as reasonably achievable.    HISTORY: Acute abdominal pain, fever.    FINDINGS: Axial postcontrast imaging was performed with 100 mL  Omnipaque 350 IV contrast. Comparison to multiple prior exams.    CT ABDOMEN: Scattered reticulonodular densities at both lung bases are  nonspecific, with no pleural effusion. The heart is enlarged, with  dense mitral annular calcifications and small sliding-type hiatal  hernia.    The liver demonstrates diffuse heterogeneous parenchymal enhancement,  with a couple of ill-defined hyperenhancing areas in the right hepatic  lobe potentially vascular shunting, nonspecific. There are multiple  calcifications in the gallbladder suggesting gallstones, with no  pericholecystic inflammation or fluid. The common hepatic duct is  dilated measuring 11 mm in diameter.    The spleen is normal in size and enhances normally, with the pancreas,  adrenal glands and kidneys enhancing normally. No renal calculi or  hydronephrosis. Scattered calcified plaque involves normal caliber  abdominal aorta and iliac arteries, with no mesenteric or  retroperitoneal lymph node enlargement. There is no bowel wall  thickening, inflammation, or bowel obstruction, with scattered colon  diverticula, without evidence of acute diverticulitis. There is no  ascites or intraperitoneal free air.    CT PELVIS: Large volume of stool lies in the rectum, with no  colorectal wall thickening or inflammation. There is a Barraza catheter  and air within collapsed urinary bladder, with right lateral wall  bladder diverticulum. There is no pelvic free fluid, with no pelvic or  inguinal lymph node  enlargement.    The extraperitoneal soft tissues enhance normally. There are no acute  osseous abnormalities, with intervertebral disc space narrowing and  facet arthropathy in the lumbar spine. No fractures or destructive  osseous lesions. The bones are diffusely osteopenic.    IMPRESSION:  1. No acute findings in the abdomen or pelvis to help explain the  patient's symptoms.  2. Additional observations as above.    Electronically Signed by Sourav JOHNSON on 10/26/2020 4:47 PM                             CT Head Without Contrast (Final result)  Result time 10/26/20 18:34:41    Final result by Osmani Queen MD (10/26/20 18:34:41)                 Impression:      1. No acute intracranial abnormality.  2. Chronic small vessel ischemic changes.  3. Left frontal and sphenoid sinus disease.      Electronically signed by: Osmani Queen MD  Date:    10/26/2020  Time:    18:34             Narrative:      CMS MANDATED QUALITY DATA - CT RADIATION - 436    All CT scans at this facility utilize dose modulation, iterative reconstruction, and/or weight based dosing when appropriate to reduce radiation dose to as low as reasonably achievable.    EXAMINATION:  CT HEAD WITHOUT CONTRAST    CLINICAL HISTORY:  Head trauma, minor (Age => 65y);  fall    TECHNIQUE:  Head CT without IV contrast.    COMPARISON:  11/13/2018    FINDINGS:  Gray-white differentiation is maintained without hemorrhage, midline shift, or mass effect.    Cerebral and cerebellar atrophy unchanged.  Chronic small vessel ischemic changes in periventricular and subcortical white matter unchanged.    The ventricles and cisterns are maintained.    Calvarium is intact. Mild left frontal and sphenoid sinus mucosal thickening is present.                               X-Ray Hip 2 View Left (Final result)  Result time 10/26/20 12:12:51    Final result by Donnie Ivy MD (10/26/20 12:12:51)                 Narrative:    CLINICAL HISTORY:  89 years (3/8/1929) Female  fall    TECHNIQUE:  XR HIP 2 VIEW LEFT. 2 view(s) obtained.    COMPARISON:  None available.    FINDINGS:  There is diffusely decreased osseous mineralization (suggesting  osteoporosis/osteopenia) which limits evaluation for subtle fractures,  that being said no acute displaced fracture or dislocation is seen.  The hip joint appears to be congruent. The pubic symphysis and SI  joints are maintained. There is soft tissue swelling over the lateral  aspect of the thigh with no radiopaque foreign body seen.    IMPRESSION:  No acute fracture or dislocation.                  .    Electronically Signed by MONA Nur on 10/26/2020 12:17 PM                             X-Ray Pelvis 3 view inc Hip 2 view Right (Final result)  Result time 10/26/20 11:38:44   Procedure changed from X-Ray Hip 2 View Right     Final result by Sourav Esparza MD (10/26/20 11:38:44)                 Narrative:    HISTORY: Pelvic pain, right hip pain, post trauma sustained  in a  fall.    FINDINGS: AP pelvis and 3 views of the right hip show no acute  fracture, dislocation or destructive osseous lesion. There is  degenerative narrowing of both hip joint spaces, with narrowing of the  symphysis pubis. The bones are diffusely osteopenic. There are  scattered peripheral arterial vascular calcifications.    IMPRESSION: Negative for acute fracture or dislocation.    Electronically Signed by Sourav JOHNSON on 10/26/2020 11:43 AM                             X-Ray Chest AP Portable (Final result)  Result time 10/26/20 11:36:44    Final result by Sourav Esparza MD (10/26/20 11:36:44)                 Narrative:    HISTORY: Acute dyspnea, CHF.    FINDINGS: Portable chest radiograph at 1117 hours compared to  11/13/2018 shows the cardiac silhouette is enlarged, stable in size,  with the pulmonary vasculature stable and within normal limits. There  are aortic vascular calcifications and dense mitral annular  calcifications.    The lungs are  symmetrically expanded, with scattered coarse  reticulonodular densities throughout both lungs, similar to the prior  exam. There is unchanged blunting of the costophrenic angles,  nonspecific, with no large pleural effusion or evidence of shady  interstitial pulmonary edema. No pneumothorax.    The bones are diffusely osteopenic, with intervertebral disc space  narrowing in the spine. No acute fractures identified.    IMPRESSION: Stable cardiomegaly and scattered chronic interstitial  opacities, with no definite evidence of acute cardiopulmonary disease.    Electronically Signed by Sourav JOHNSON on 10/26/2020 11:42 AM                                            Attending Attestation:     Physician Attestation Statement for NP/PA:   I have conducted a face to face encounter with this patient in addition to the NP/PA, due to Medical Complexity    Other NP/PA Attestation Additions:    History of Present Illness: Patient presents after being found down in her house.  Patient was on the floor throughout the night per the .   Physical Exam: Patient appears elderly and unkept.  There is lower extremity edema with additional mild erythema to the lower extremities suggestive of cellulitis there is a small sacral decubitus.  Patient is awake and alert.   Medical Decision Making: Patient with multiple lab abnormalities with findings suggestive of rhabdomyolysis, severe sepsis, elevated troponin.  Patient received sepsis fluid protocol in the emergency department as well as broad-spectrum antibiotics.  Possible sources include lower extremity cellulitis.  CT scan of the abdomen and pelvis is without acute abnormality although there is some suggestion of gallstones and dilatation of hepatic duct.  Patient has no right upper quadrant pain.  Patient's bilirubin however is elevated.  Awaiting gallbladder ultrasound.  Patient consulted to Hospital Medicine for admission into the hospital secondary to multiple  abnormalities.  Patient did have 1 episode of hypotension but this responded easily to IV fluids.  She remains clinically stable in guarded condition.  I agree with the nurse practitioner's assessment, documentation, plan                 ED Course as of Oct 27 0846   Mon Oct 26, 2020   1128 X-Ray Pelvis 3 view inc Hip 2 view Right [MP]   1327 Delay in patient's care secondary to 2-1/2 hour delay and she fell getting patient's blood drawn    [MP]      ED Course User Index  [MP] MEHRDAD Tinoco            Clinical Impression:       ICD-10-CM ICD-9-CM   1. Non-traumatic rhabdomyolysis  M62.82 728.88   2. Shortness of breath  R06.02 786.05   3. Fall  W19.XXXA E888.9   4. Severe sepsis  A41.9 038.9    R65.20 995.92   5. NSTEMI (non-ST elevated myocardial infarction)  I21.4 410.70   6. Abnormal EKG  R94.31 794.31   7. CHF (congestive heart failure)  I50.9 428.0   8. Troponin level elevated  R77.8 790.6                          ED Disposition Condition    Admit                             Oracio Trujillo MD  10/27/20 0846

## 2020-10-27 ENCOUNTER — CLINICAL SUPPORT (OUTPATIENT)
Dept: CARDIOLOGY | Facility: HOSPITAL | Age: 85
DRG: 871 | End: 2020-10-27
Attending: STUDENT IN AN ORGANIZED HEALTH CARE EDUCATION/TRAINING PROGRAM
Payer: MEDICARE

## 2020-10-27 VITALS — WEIGHT: 195 LBS | BODY MASS INDEX: 38.28 KG/M2 | HEIGHT: 60 IN

## 2020-10-27 PROBLEM — I50.9 CHF EXACERBATION: Status: ACTIVE | Noted: 2020-10-27

## 2020-10-27 LAB
ALBUMIN SERPL BCP-MCNC: 2.8 G/DL (ref 3.5–5.2)
ALP SERPL-CCNC: 54 U/L (ref 55–135)
ALT SERPL W/O P-5'-P-CCNC: 30 U/L (ref 10–44)
ANION GAP SERPL CALC-SCNC: 11 MMOL/L (ref 8–16)
AST SERPL-CCNC: 95 U/L (ref 10–40)
BASOPHILS # BLD AUTO: 0.06 K/UL (ref 0–0.2)
BASOPHILS NFR BLD: 0.8 % (ref 0–1.9)
BILIRUB SERPL-MCNC: 2.4 MG/DL (ref 0.1–1)
BUN SERPL-MCNC: 24 MG/DL (ref 10–30)
CALCIUM SERPL-MCNC: 9 MG/DL (ref 8.7–10.5)
CHLORIDE SERPL-SCNC: 109 MMOL/L (ref 95–110)
CK SERPL-CCNC: 1126 U/L (ref 20–180)
CK SERPL-CCNC: 696 U/L (ref 20–180)
CK SERPL-CCNC: 821 U/L (ref 20–180)
CO2 SERPL-SCNC: 21 MMOL/L (ref 23–29)
CREAT SERPL-MCNC: 0.8 MG/DL (ref 0.5–1.4)
DIFFERENTIAL METHOD: ABNORMAL
EOSINOPHIL # BLD AUTO: 0.2 K/UL (ref 0–0.5)
EOSINOPHIL NFR BLD: 2.9 % (ref 0–8)
ERYTHROCYTE [DISTWIDTH] IN BLOOD BY AUTOMATED COUNT: 14.8 % (ref 11.5–14.5)
EST. GFR  (AFRICAN AMERICAN): >60 ML/MIN/1.73 M^2
EST. GFR  (NON AFRICAN AMERICAN): >60 ML/MIN/1.73 M^2
GLUCOSE SERPL-MCNC: 104 MG/DL (ref 70–110)
HCT VFR BLD AUTO: 40.5 % (ref 37–48.5)
HGB BLD-MCNC: 12.5 G/DL (ref 12–16)
IMM GRANULOCYTES # BLD AUTO: 0.04 K/UL (ref 0–0.04)
IMM GRANULOCYTES NFR BLD AUTO: 0.5 % (ref 0–0.5)
LACTATE SERPL-SCNC: 2.1 MMOL/L (ref 0.5–1.9)
LACTATE SERPL-SCNC: 2.2 MMOL/L (ref 0.5–1.9)
LACTATE SERPL-SCNC: 2.6 MMOL/L (ref 0.5–1.9)
LACTATE SERPL-SCNC: 2.6 MMOL/L (ref 0.5–1.9)
LACTATE SERPL-SCNC: 2.7 MMOL/L (ref 0.5–1.9)
LYMPHOCYTES # BLD AUTO: 1.2 K/UL (ref 1–4.8)
LYMPHOCYTES NFR BLD: 15 % (ref 18–48)
MAGNESIUM SERPL-MCNC: 1.9 MG/DL (ref 1.6–2.6)
MCH RBC QN AUTO: 32.9 PG (ref 27–31)
MCHC RBC AUTO-ENTMCNC: 30.9 G/DL (ref 32–36)
MCV RBC AUTO: 107 FL (ref 82–98)
MONOCYTES # BLD AUTO: 0.9 K/UL (ref 0.3–1)
MONOCYTES NFR BLD: 11.8 % (ref 4–15)
NEUTROPHILS # BLD AUTO: 5.4 K/UL (ref 1.8–7.7)
NEUTROPHILS NFR BLD: 69 % (ref 38–73)
NRBC BLD-RTO: 0 /100 WBC
PHOSPHATE SERPL-MCNC: 2.9 MG/DL (ref 2.7–4.5)
PLATELET # BLD AUTO: 107 K/UL (ref 150–350)
PMV BLD AUTO: 10.6 FL (ref 9.2–12.9)
POTASSIUM SERPL-SCNC: 3.9 MMOL/L (ref 3.5–5.1)
PROT SERPL-MCNC: 6.7 G/DL (ref 6–8.4)
RBC # BLD AUTO: 3.8 M/UL (ref 4–5.4)
SODIUM SERPL-SCNC: 141 MMOL/L (ref 136–145)
TROPONIN I SERPL DL<=0.01 NG/ML-MCNC: 1.61 NG/ML
TROPONIN I SERPL DL<=0.01 NG/ML-MCNC: 2.43 NG/ML
TROPONIN I SERPL DL<=0.01 NG/ML-MCNC: 3.31 NG/ML
WBC # BLD AUTO: 7.82 K/UL (ref 3.9–12.7)

## 2020-10-27 PROCEDURE — 27000221 HC OXYGEN, UP TO 24 HOURS

## 2020-10-27 PROCEDURE — 93010 EKG 12-LEAD: ICD-10-PCS | Mod: ,,, | Performed by: INTERNAL MEDICINE

## 2020-10-27 PROCEDURE — 93005 ELECTROCARDIOGRAM TRACING: CPT | Performed by: INTERNAL MEDICINE

## 2020-10-27 PROCEDURE — 82550 ASSAY OF CK (CPK): CPT | Mod: 91

## 2020-10-27 PROCEDURE — 99223 PR INITIAL HOSPITAL CARE,LEVL III: ICD-10-PCS | Mod: ,,, | Performed by: INTERNAL MEDICINE

## 2020-10-27 PROCEDURE — 36415 COLL VENOUS BLD VENIPUNCTURE: CPT

## 2020-10-27 PROCEDURE — 80053 COMPREHEN METABOLIC PANEL: CPT

## 2020-10-27 PROCEDURE — 83605 ASSAY OF LACTIC ACID: CPT | Mod: 91

## 2020-10-27 PROCEDURE — 97162 PT EVAL MOD COMPLEX 30 MIN: CPT

## 2020-10-27 PROCEDURE — 97167 OT EVAL HIGH COMPLEX 60 MIN: CPT

## 2020-10-27 PROCEDURE — 85025 COMPLETE CBC W/AUTO DIFF WBC: CPT

## 2020-10-27 PROCEDURE — 97530 THERAPEUTIC ACTIVITIES: CPT

## 2020-10-27 PROCEDURE — 83605 ASSAY OF LACTIC ACID: CPT

## 2020-10-27 PROCEDURE — 84100 ASSAY OF PHOSPHORUS: CPT

## 2020-10-27 PROCEDURE — 25000003 PHARM REV CODE 250: Performed by: STUDENT IN AN ORGANIZED HEALTH CARE EDUCATION/TRAINING PROGRAM

## 2020-10-27 PROCEDURE — 82550 ASSAY OF CK (CPK): CPT

## 2020-10-27 PROCEDURE — 83735 ASSAY OF MAGNESIUM: CPT

## 2020-10-27 PROCEDURE — 93306 TTE W/DOPPLER COMPLETE: CPT

## 2020-10-27 PROCEDURE — 21400001 HC TELEMETRY ROOM

## 2020-10-27 PROCEDURE — 63600175 PHARM REV CODE 636 W HCPCS: Performed by: STUDENT IN AN ORGANIZED HEALTH CARE EDUCATION/TRAINING PROGRAM

## 2020-10-27 PROCEDURE — 93010 ELECTROCARDIOGRAM REPORT: CPT | Mod: ,,, | Performed by: INTERNAL MEDICINE

## 2020-10-27 PROCEDURE — 94761 N-INVAS EAR/PLS OXIMETRY MLT: CPT

## 2020-10-27 PROCEDURE — 84484 ASSAY OF TROPONIN QUANT: CPT | Mod: 91

## 2020-10-27 PROCEDURE — 99223 1ST HOSP IP/OBS HIGH 75: CPT | Mod: ,,, | Performed by: INTERNAL MEDICINE

## 2020-10-27 RX ORDER — FUROSEMIDE 10 MG/ML
40 INJECTION INTRAMUSCULAR; INTRAVENOUS DAILY
Status: DISCONTINUED | OUTPATIENT
Start: 2020-10-28 | End: 2020-10-30 | Stop reason: HOSPADM

## 2020-10-27 RX ORDER — NAPROXEN SODIUM 220 MG/1
81 TABLET, FILM COATED ORAL DAILY
Status: DISCONTINUED | OUTPATIENT
Start: 2020-10-28 | End: 2020-10-30 | Stop reason: HOSPADM

## 2020-10-27 RX ADMIN — ENOXAPARIN SODIUM 40 MG: 30 INJECTION SUBCUTANEOUS at 07:10

## 2020-10-27 RX ADMIN — VANCOMYCIN HYDROCHLORIDE 1500 MG: 1 INJECTION, POWDER, LYOPHILIZED, FOR SOLUTION INTRAVENOUS at 08:10

## 2020-10-27 RX ADMIN — POLYETHYLENE GLYCOL 3350 17 G: 17 POWDER, FOR SOLUTION ORAL at 11:10

## 2020-10-27 RX ADMIN — PIPERACILLIN SODIUM AND TAZOBACTAM SODIUM 3.38 G: 3; .375 INJECTION, POWDER, LYOPHILIZED, FOR SOLUTION INTRAVENOUS at 02:10

## 2020-10-27 RX ADMIN — PIPERACILLIN SODIUM AND TAZOBACTAM SODIUM 3.38 G: 3; .375 INJECTION, POWDER, LYOPHILIZED, FOR SOLUTION INTRAVENOUS at 07:10

## 2020-10-27 RX ADMIN — PIPERACILLIN SODIUM AND TAZOBACTAM SODIUM 3.38 G: 3; .375 INJECTION, POWDER, LYOPHILIZED, FOR SOLUTION INTRAVENOUS at 11:10

## 2020-10-27 NOTE — PLAN OF CARE
"SW discussed hospice consult with Dr. Ragsdale. Per record, pt recently discussed hospice with PCP to make sure "opt to send pt to ER to make sure no reversible cause for pt AMS and falls" per Dr. Millard's note. Dr. Ragsdale spoke with pt today, and her plan states:    "C/w broad spectrum abx vanco zosyn  Gentle hydration  Repeat CXR BNP tomorrow  If no progress consider hospice eval TOMORROW."    Note that Dr. Millard's note also indicates a discussion with family about pt's  being unable to care for her and going to [place] with her dtr. SW/CM to continue to follow.  "

## 2020-10-27 NOTE — PROGRESS NOTES
[facility] Medicine  Progress Note    Patient name: Fracisco Pires  MRN: [id number]  Admit Date: 10/26/2020   LOS: 1 day     SUBJECTIVE:     Principal problem: Sepsis    Interval History:    Pt feels tired , wanted to sleep, but awake oriented to name ,placed not time but knws her   Lactate trending up ,audible wheeze on gentle hydration  Troponin improving, denies CP  Consult cardiology for optimize CHF mx with sepsis  Wound care eval appreciated for decubiti    Scheduled Meds:   enoxaparin  40 mg Subcutaneous Q24H    piperacillin-tazobactam (ZOSYN) IVPB  3.375 g Intravenous Q8H    polyethylene glycol  17 g Oral Daily    vancomycin (VANCOCIN) IVPB  1,500 mg Intravenous Q24H     Continuous Infusions:    PRN Meds:acetaminophen, magnesium oxide, magnesium oxide, melatonin, ondansetron, promethazine (PHENERGAN) IVPB, sodium chloride 0.9%, traMADoL, Pharmacy to dose Vancomycin consult **AND** vancomycin - pharmacy to dose    Review of patient's allergies indicates:   Allergen Reactions    No known drug allergies        Review of Systems: As per interval history    OBJECTIVE:     Vital Signs (Most Recent)  Temp: 98 °F (36.7 °C) (10/27/20 1620)  Pulse: 102 (10/27/20 1620)  Resp: 18 (10/27/20 1620)  BP: 136/69 (10/27/20 1620)  SpO2: 100 % (10/27/20 162)    Vital Signs Range (Last 24H):  Temp:  [97.6 °F (36.4 °C)-98.3 °F (36.8 °C)]   Pulse:  []   Resp:  [18-22]   BP: (107-136)/(53-69)   SpO2:  [92 %-100 %]     I & O (Last 24H):    Intake/Output Summary (Last 24 hours) at 10/27/2020 1948  Last data filed at 10/27/2020 0300  Gross per 24 hour   Intake --   Output 750 ml   Net -750 ml       Physical Exam:  General: Patient resting comfortably, fatigue+. Appears as stated age. Calm on 2l nc, audible wheeze  Eyes: No conjunctival injection. No scleral icterus.  ENT: heard of Hearing . No discharge from ears. No nasal discharge.   Neck: Supple, trachea midline. No JVD  CVS: RRR.    Lungs:Decreased breath sound bl  ,  Wheezing+  basal crackles. Mild  acute respiratory distress  Abdomen:  Soft, nontender and nondistended.  No organomegaly, mccarty with dark urine  Neuro: AOx3. Moves all extremities. Follows commands. Responds appropriately   Skin:  No rash or erythema noted  BLE edema 3+ with tenderness  stage 2 buttock and hip. bilat lower legs weeping. and yeast to the folds   Laboratory:  CBC:   Recent Labs   Lab 10/27/20  0421   WBC 7.82   RBC 3.80*   HGB 12.5   HCT 40.5   *   *   MCH 32.9*   MCHC 30.9*     CMP:   Recent Labs   Lab 10/27/20  0421      CALCIUM 9.0   ALBUMIN 2.8*   PROT 6.7      K 3.9   CO2 21*      BUN 24   CREATININE 0.8   ALKPHOS 54*   ALT 30   AST 95*   BILITOT 2.4*       Diagnostic Results:  US: Reviewed   Cholelithiasis.    ASSESSMENT/PLAN:         Active Hospital Problems    Diagnosis  POA    *Sepsis(995.91) [A41.9]  Yes     Dx updated per 2019 IMO Load      CHF exacerbation [I50.9]  Unknown     Possibly 2/2 recent MI      Non-traumatic rhabdomyolysis [M62.82]  Yes    Fall [W19.XXXA]  Yes    Acute cystitis [N30.00]  Yes    Venous stasis dermatitis of both lower extremities [I87.2]  Yes    Essential hypertension [I10]  Yes    Noncompliance with medication regimen [Z91.14]  Not Applicable    ILD (interstitial lung disease) [J84.9]  Yes    Excessive tear production of both lacrimal glands [H04.203]  Yes    Bilateral lower extremity edema [R60.0]  Yes    Debility [R53.81]  Yes    CKD (chronic kidney disease) stage 3, GFR 30-59 ml/min [N18.30]  Yes     Chronic    Chronic diastolic congestive heart failure [I50.32]  Yes     Chronic    Sensorineural hearing loss (SNHL) of both ears [H90.3]  Yes     Chronic    Complete left bundle branch block [I44.7]  Yes    Hypertension [I10]  Yes     Chronic      Resolved Hospital Problems   No resolved problems to display.         Plan:   C/w broad spectrum abx vanco zosyn  D/C Gentle  hydration  Repeat CXR BNP tomorrow  If no progress consider hospice eval  TOMORROW  F/u cardiology consult   CK trending down  F/u Echo  C/W pt/ot IP    VTE Risk Mitigation (From admission, onward)         Ordered     enoxaparin injection 40 mg  Every 24 hours      10/26/20 1932     IP VTE HIGH RISK PATIENT  Once      10/26/20 1932     Place sequential compression device  Until discontinued      10/26/20 1932                  Department Hospital Medicine  [facility]  Marga Ragsdale MD  Date of service: 10/27/2020

## 2020-10-27 NOTE — PT/OT/SLP EVAL
"Occupational Therapy   Evaluation    Name: Fracisco Pires  MRN: [id number]  Admitting Diagnosis:  Sepsis      Recommendations:     Discharge Recommendations: rehabilitation facility  Discharge Equipment Recommendations:  bedside commode, walker, rolling  Barriers to discharge:  Decreased caregiver support    Assessment:     Fracisco Pires is a 89 y.o. female with a medical diagnosis of Sepsis.  She presents with pleasant attitude and after the OT explains the role of OT, the pt states, I need to use the bathroom. Performance deficits affecting function: weakness, impaired endurance, impaired self care skills, impaired functional mobilty, gait instability, impaired balance, decreased lower extremity function, decreased safety awareness, pain, impaired skin, edema.      Rehab Prognosis: Fair; patient would benefit from acute skilled OT services to address these deficits and reach maximum level of function.       Plan:     Patient to be seen 5 x/week to address the above listed problems via self-care/home management, therapeutic activities, therapeutic exercises  · Plan of Care Expires: 11/27/20  · Plan of Care Reviewed with: patient, spouse    Subjective     Chief Complaint: "I need to use the bathroom"  Patient/Family Comments/goals: "To be able to walk around some and get around some"    Occupational Profile:  Living Environment: one story house with ramp to enter, walk in shower with shower chair, handles attached to one standard toilet  Previous level of function: ambulates short distances in the home with rollator at Mod I level, w/c outside the home with her w/c to the porch, Mod I w/ ADL's, sponge bathes, no cooking; RECREATION: enjoys reading the newspaper and magazines  Roles and Routines: , lives with her ; mostly in the home  Equipment Used at Home:  wheelchair, rollator, shower chair(handles attached to her toilet in the back bathroom)  Assistance upon Discharge: " "    Pain/Comfort:  · Pain Rating 1: 10/10  · Location - Side 1: Left  · Location 1: knee  · Pain Addressed 1: Reposition, Nurse notified(use of pillows for support during mobility)  · Pain Rating Post-Intervention 1: 10/10("It's been hurting for years")    Patients cultural, spiritual, Hindu conflicts given the current situation: no    Objective:     Communicated with: Nursing prior to session.  Patient found HOB elevated with telemetry, peripheral IV, mccarty catheter upon OT entry to room.    General Precautions: Standard, fall   Orthopedic Precautions:N/A   Braces: N/A     Occupational Performance:    Bed Mobility:    · Patient completed Rolling/Turning to Left with  maximal assistance, with side rail and with complaints of 10/10 pain in her left knee with bed mobility   · Patient completed Scooting/Bridging with contact guard assistance and seated to the edge of the bed, unable to bridge   · Patient completed Supine to Sit with total assistance, with side rail and use of pillow to support her L knee due to pain   · Patient completed Sit to Supine with dependent and 2 persons (OT and wound care nurse); use of bed linens to pull the pt up in bed was needed; positioned pt's L LE on pillow for pain mgt  · O2 sats in the beginning of OT session and at the end ranged from 99%-100% on 1 LNC    Functional Mobility/Transfers:  · Patient completed Sit <> Stand Transfer tested however, the pt was unable to achieve full stance; pt initiates standing with forward wt shift, however, does not achieve APT   · Functional Mobility: plan to complete bed to chair to her right LE, however, pt unable to take a step; therefore OT set up a BSC to her left side because pt states, "That leg is stronger" however the pt was still unable to take a step     Activities of Daily Living:  · Feeding:  and dentures    · Grooming: pt met max fatigue and LAURENCE due to the need for bedpan use     · Lower Body Dressing: dependence to alejo " socks; pt with signficant edema, blisters on her distal B LE's and feet  · Toileting: dependence x 2 persons in bed with bedpan at this time    Cognitive/Visual Perceptual:  Cognitive/Psychosocial Skills:     -       Follows Commands/attention:Follows two-step commands  -       Communication: clear/fluent and however, the pt is very hard of hearing   -       Memory: Impaired STM, Impaired LTM and  needs to assist the pt with PLOF status and home set up;insight deficits related to questions being asked  -       Safety awareness/insight to disability: impaired   -       Mood/Affect/Coping skills/emotional control: Appropriate to situation, Cooperative, Pleasant and Guarded  Visual/Perceptual:      -Intact  -pt does not use glasses to read       Physical Exam:  Balance: -       good static sitting; poor static standing  Postural examination/scapula alignment:    -       Rounded shoulders  -       Posterior pelvic tilt  Skin integrity: Bruising of L knee , Thin and red, blisters of B LE'  Edema:  Severe B LE's and especially B feet  Dominant hand: -       right  Upper Extremity Range of Motion:     -       Right Upper Extremity: WNL  -       Left Upper Extremity: WNL  Upper Extremity Strength:    -       Right Upper Extremity: WNL  -       Left Upper Extremity: WNL   Strength:    -       Right Upper Extremity: WNL  -       Left Upper Extremity: WNL  Fine Motor Coordination:    -       Intact  Gross motor coordination:   WFL    AMPAC 6 Click ADL:  AMPAC Total Score: 11    Treatment & Education: role of OT, call don't fall; Therapeutic activity with bed mobility, sit to stand transition training, initiation of bed to chair/BSC transfer training with hand placement, rational; education on purpose of 3:1 commode; ed on post-acute therapy services recommended and the benefits  Education:    Patient left HOB elevated sitting on bedpan with assist from wound care nurse with all lines intact, call button in reach,  bed alarm on, nurse notified and  present    GOALS:   Multidisciplinary Problems     Occupational Therapy Goals        Problem: Occupational Therapy Goal    Goal Priority Disciplines Outcome Interventions   Occupational Therapy Goal     OT, PT/OT     Description: Goals to be met by:discharge    Patient will increase functional independence with ADLs by performing:    Feeding with Modified [place].  UE Dressing with Supervision.  LE Dressing with Minimal Assistance.  Grooming while seated with Modified [place].  Toileting from bedside commode with Minimal Assistance for hygiene and clothing management.   Bathing from  sitting at sink with Minimal Assistance.  Toilet transfer to bedside commode with Minimal Assistance.  Upper extremity exercise program 2 x 10 reps per handout, with supervision.                     History:     Past Medical History:   Diagnosis Date    Arthritis     Asthma     recent bronchits treated and resoved with Levaquin. Last Nov 2011.    Cataract     ou done//    Diabetes mellitus     diet controlled    GERD (gastroesophageal reflux disease)     Hypertension     Phlebitis and thrombophlebitis of the leg 2005 before    Skin lesion of hand        Past Surgical History:   Procedure Laterality Date    CATARACT EXTRACTION      os d 11/6/13// od d 4/16/14//    EYE SURGERY      both eyes    JOINT REPLACEMENT      bilateral knee replacement       Time Tracking:     OT Date of Treatment: 10/27/20  OT Start Time: 0950  OT Stop Time: 1038  OT Total Time (min): 48 min    Billable Minutes:Evaluation 18  Therapeutic Activity 30    Jennyfer Cruz OT  10/27/2020

## 2020-10-27 NOTE — PROGRESS NOTES
VANCOMYCIN PHARMACOKINETIC NOTE:  Vancomycin Day # 1    Objective/Assessment:    Diagnosis/Indication for Vancomycin: Urinary Tract Infection     89 y.o., female; Actual Body Weight = 88.9 kg (195 lb 15.8 oz).    The patient has the following labs:  10/26/2020 Estimated Creatinine Clearance: 45.5 mL/min (based on SCr of 0.8 mg/dL). Lab Results   Component Value Date    BUN 23 10/26/2020     Lab Results   Component Value Date    WBC 9.37 10/26/2020        Plan:  Adjust vancomycin dose and/or frequency based on the patient's actual weight and renal function:  Initiate Vancomycin 1500 mg IV every 24 hours.  Orders have been entered into patient's chart.      Vancomycin trough level has been ordered for 10/29/20 @ 19:30.    Pharmacy will manage vancomycin therapy, monitor serum vancomycin levels, monitor renal function and adjust regimen as necessary.    Thank you for allowing us to participate in this patient's care.     Lenin Rocha 10/26/2020 7:40 PM  Department of Pharmacy  Ext [phone]

## 2020-10-27 NOTE — PLAN OF CARE
10/27/20 1500   Patient Assessment/Suction   Level of Consciousness (AVPU) alert   Respiratory Effort Normal;Unlabored   Expansion/Accessory Muscles/Retractions no use of accessory muscles   PRE-TX-O2   O2 Device (Oxygen Therapy) nasal cannula   $ Is the patient on Low Flow Oxygen? Yes   Flow (L/min) 2   SpO2 99 %   Pulse Oximetry Type Intermittent   $ Pulse Oximetry - Multiple Charge Pulse Oximetry - Multiple   Pulse 100   Resp (!) 22

## 2020-10-27 NOTE — PT/OT/SLP EVAL
"Physical Therapy Evaluation    Patient Name:  Fracisco Pires   MRN:  [id number]    Recommendations:     Discharge Recommendations:  rehabilitation facility   Discharge Equipment Recommendations: bedside commode, walker, rolling   Barriers to discharge: None    Assessment:     Fracisco Pires is a 89 y.o. female admitted with a medical diagnosis of Sepsis.  She presents with the following impairments/functional limitations:  weakness, impaired endurance, impaired self care skills, impaired functional mobilty, gait instability, impaired balance, decreased lower extremity function, decreased safety awareness, pain, impaired skin, impaired cardiopulmonary response to activity. PT presents with OT and wound care RN repositioning patient in bed. PT performs evaluation and assessment with OT and from OT findings prior to entry in room. Pt left on bed pan with  present; both edu on not staying on bed pan longer than 20 mins in order to protect patient's skin. Student RN also aware.     Rehab Prognosis: Good; patient would benefit from acute skilled PT services to address these deficits and reach maximum level of function.    Recent Surgery: * No surgery found *      Plan:     During this hospitalization, patient to be seen 6 x/week to address the identified rehab impairments via gait training, therapeutic activities, therapeutic exercises and progress toward the following goals:    · Plan of Care Expires:  11/27/20    Subjective     Chief Complaint: left knee pain  Patient/Family Comments/goals: "walk a little bit to get around at home."  Pain/Comfort:  · Pain Rating 1: 10/10  · Location - Side 1: Left  · Location 1: knee  · Pain Addressed 1: Cessation of Activity  · Pain Rating Post-Intervention 1: 10/10  · Pain Rating 2: 6/10(all over)  · Pain Addressed 2: Cessation of Activity  · Pain Rating Post-Intervention 2: 6/10    Patients cultural, spiritual, Orthodox conflicts given the current situation:      Living " Environment:  Pt lives with  who is unable to provide physical assistance to patient 2/2 to own disabilities. Home is [facility] with ramp to enter.  Prior to admission, patients level of function was Mod indep with rollator.  Equipment used at home: wheelchair, rollator, shower chair.  DME owned (not currently used): none.  Upon discharge, patient will have assistance from family.    Objective:     Communicated with RN and OT prior to session.  Patient found supine with bed alarm, oxygen, mccarty catheter, telemetry  upon PT entry to room.    General Precautions: Standard, fall   Orthopedic Precautions:    Braces:       Exams:  · Cognitive Exam:  Patient is oriented to Person  · RLE ROM: WFL  · RLE Strength: WFL  · LLE ROM: decreased ROM and guarding at left knee  · LLE Strength: 3-/5    Functional Mobility:  · Bed Mobility:     · Rolling Left:  total assistance  · Rolling Right: total assistance   · OT states supine to sit and back was a total A transfer; OT attempted sit to stand with bottom not clearing bed and unable to extend into full stand      Therapeutic Activities and Exercises:   bed mobility    AM-PAC 6 CLICK MOBILITY  Total Score:10     Patient left supine with all lines intact, call button in reach, bed alarm on and RN notified.    GOALS:   Multidisciplinary Problems     Physical Therapy Goals        Problem: Physical Therapy Goal    Goal Priority Disciplines Outcome Goal Variances Interventions   Physical Therapy Goal     PT, PT/OT      Description: Goals to be met by: discharge     Patient will increase functional independence with mobility by performin. Supine to sit with Moderate Assistance  2. Sit to supine with Moderate Assistance  3. Rolling to Left and Right with Moderate Assistance.  4. Sit to stand transfer with Moderate Assistance  5. Bed to chair transfer with Moderate Assistance using Rolling Walker  6. Gait  x 15 feet with Moderate Assistance using Rolling Walker.                       History:     Past Medical History:   Diagnosis Date    Arthritis     Asthma     recent bronchits treated and resoved with Levaquin. Last Nov 2011.    Cataract     ou done//    Diabetes mellitus     diet controlled    GERD (gastroesophageal reflux disease)     Hypertension     Phlebitis and thrombophlebitis of the leg 2005 before    Skin lesion of hand        Past Surgical History:   Procedure Laterality Date    CATARACT EXTRACTION      os d 11/6/13// od d 4/16/14//    EYE SURGERY      both eyes    JOINT REPLACEMENT      bilateral knee replacement       Time Tracking:     PT Received On: 10/27/20  PT Start Time: 1026     PT Stop Time: 1040  PT Total Time (min): 14 min     Billable Minutes: Evaluation 14      Irene Centeno, PT  10/27/2020

## 2020-10-27 NOTE — PLAN OF CARE
Important Message from Medicare was sign, explained and given to patient/caregiver on 10/27/2020 at 2:51pm     answered all questions.

## 2020-10-27 NOTE — CARE UPDATE
10/26/20 2731   Patient Assessment/Suction   Level of Consciousness (AVPU) alert   Respiratory Effort Normal;Unlabored   Expansion/Accessory Muscles/Retractions no use of accessory muscles;no retractions;expansion symmetric   All Lung Fields Breath Sounds equal bilaterally;diminished   Rhythm/Pattern, Respiratory unlabored;pattern regular;depth regular   PRE-TX-O2   O2 Device (Oxygen Therapy) nasal cannula   Flow (L/min) 2   SpO2 100 %   Pulse Oximetry Type Intermittent   $ Pulse Oximetry - Multiple Charge Pulse Oximetry - Multiple   Pulse 61   Resp 18   Respiratory Evaluation   $ Care Plan Tech Time 15 min

## 2020-10-27 NOTE — CONSULTS
[facility]  Cardiology  Consult Note    Patient Name: Fracisco Pires  MRN: [id number]  Admission Date: 10/26/2020  Hospital Length of Stay: 1 days  Code Status: DNR   Attending Provider: Marga Ragsdale MD   Consulting Provider: Rodolfo Vasquez MD  Primary Care Physician: Rachel Millard MD  Principal Problem:Sepsis    Patient information was obtained from patient, past medical records and ER records.     Inpatient consult to Cardiology  Consult performed by: Rodolfo Vasquez MD  Consult ordered by: Marga Ragsdale MD        Subjective:     REASON FOR CONSULT:  NSTEMI     HPI:  89-year-old obese female with a past medical history significant for congestive heart failure, chronic venous insufficiency, hypertension was admitted to the hospital after she is supposedly slipped and fell and has been on the floor for a couple of days.  She has not been eating appropriately.  Currently at the time of my interview patient denies any chest pain.  She denies any shortness of breath.  She denies any palpitations.  She reports lower extremity edema.  She denies any lightheadedness, dizziness.  Her troponin was elevated.  EKG shows left bundle-branch block which is chronic.    Past Medical History:   Diagnosis Date    Arthritis     Asthma     recent bronchits treated and resoved with Levaquin. Last Nov 2011.    Cataract     ou done//    Diabetes mellitus     diet controlled    GERD (gastroesophageal reflux disease)     Hypertension     Phlebitis and thrombophlebitis of the leg 2005 before    Skin lesion of hand        Past Surgical History:   Procedure Laterality Date    CATARACT EXTRACTION      os d 11/6/13// od d 4/16/14//    EYE SURGERY      both eyes    JOINT REPLACEMENT      bilateral knee replacement       Review of patient's allergies indicates:   Allergen Reactions    No known drug allergies        No current facility-administered medications on file prior to encounter.       Current Outpatient Medications on File Prior to Encounter   Medication Sig    ergocalciferol (ERGOCALCIFEROL) 50,000 unit Cap Take 1 capsule (50,000 Units total) by mouth every 7 days. For vitamin D    furosemide (LASIX) 20 MG tablet Take 1 tablet (20 mg total) by mouth once daily. For leg swelling (Patient not taking: Reported on 2020)       Scheduled Meds:   enoxaparin  40 mg Subcutaneous Q24H    piperacillin-tazobactam (ZOSYN) IVPB  3.375 g Intravenous Q8H    polyethylene glycol  17 g Oral Daily    vancomycin (VANCOCIN) IVPB  1,500 mg Intravenous Q24H     Continuous Infusions:   sodium chloride 0.9% 50 mL/hr at 10/26/20 194     PRN Meds:.acetaminophen, magnesium oxide, magnesium oxide, melatonin, ondansetron, promethazine (PHENERGAN) IVPB, sodium chloride 0.9%, traMADoL, Pharmacy to dose Vancomycin consult **AND** vancomycin - pharmacy to dose    Family History     Problem Relation (Age of Onset)    Heart disease Father    Stroke Mother          Tobacco Use    Smoking status: Former Smoker     Packs/day: 1.50     Years: 50.00     Pack years: 75.00     Types: Cigarettes     Quit date: 1990     Years since quittin.9   Substance and Sexual Activity    Alcohol use: Yes     Alcohol/week: 28.0 standard drinks     Types: 14 Glasses of wine, 14 Cans of beer per week     Frequency: 4 or more times a week     Drinks per session: 3 or 4     Binge frequency: Daily or almost daily     Comment: 1-3 glasses of wine and/or beer daily    Drug use: No    Sexual activity: Not Currently       ROS   No significant headaches or sore throat or runny nose.   No recent changes in vision.   No recent changes in hearing.  No dysphagia or odynophagia.  Denies any chest pain or shortness of breath.   Denies any cough or hemoptysis.   Denies any abdominal pain, nausea, vomiting, diarrhea or constipation.   Denies any dysuria or polyuria.   Denies any fevers or chills.   Denies any recent significant weight  changes.   Denies bleeding diathesis    Objective:     Vital Signs (Most Recent):  Temp: 98 °F (36.7 °C) (10/27/20 1620)  Pulse: 102 (10/27/20 1620)  Resp: 18 (10/27/20 1620)  BP: 136/69 (10/27/20 1620)  SpO2: 100 % (10/27/20 1620) Vital Signs (24h Range):  Temp:  [97.6 °F (36.4 °C)-98.3 °F (36.8 °C)] 98 °F (36.7 °C)  Pulse:  [] 102  Resp:  [18-24] 18  SpO2:  [92 %-100 %] 100 %  BP: (107-136)/(53-69) 136/69     Weight: 88.9 kg (195 lb 15.8 oz)  Body mass index is 38.28 kg/m².    SpO2: 100 %  O2 Device (Oxygen Therapy): nasal cannula      Intake/Output Summary (Last 24 hours) at 10/27/2020 1804  Last data filed at 10/27/2020 0300  Gross per 24 hour   Intake 1050 ml   Output 750 ml   Net 300 ml       Lines/Drains/Airways     Drain                 Urethral Catheter 10/26/20 1535 Non-latex 16 Fr. 1 day          Peripheral Intravenous Line                 Peripheral IV - Single Lumen 10/26/20 1311 20 G;1 3/4 in Left Forearm 1 day         Peripheral IV - Single Lumen 10/26/20 1334 22 G Right Wrist 1 day                Physical Exam  HEENT: Normocephalic, atraumatic, PERRL, Conjunctiva pink, no scleral icterus.   CVS: S1S2+, RRR, systolic murmur appreciated, no rubs or gallops, JVP:  Difficult to estimate.  LUNGS:  Crackles positive bilaterally at the bases.  ABDOMEN: Soft, NT, BS+  EXTREMITIES: No cyanosis, clubbing.+++edema  NEURO:  Answers questions appropriately.        Significant Labs:   BMP:   Recent Labs   Lab 10/26/20  1315 10/27/20  0421   * 104    141   K 5.0 3.9    109   CO2 25 21*   BUN 23 24   CREATININE 0.8 0.8   CALCIUM 9.4 9.0   MG 1.9 1.9   , CMP   Recent Labs   Lab 10/26/20  1315 10/27/20  0421    141   K 5.0 3.9    109   CO2 25 21*   * 104   BUN 23 24   CREATININE 0.8 0.8   CALCIUM 9.4 9.0   PROT 7.0 6.7   ALBUMIN 2.9* 2.8*   BILITOT 3.0* 2.4*   ALKPHOS 50* 54*   * 95*   ALT 29 30   ANIONGAP 10 11   ESTGFRAFRICA >60.0 >60.0   EGFRNONAA >60.0 >60.0   ,  CBC   Recent Labs   Lab 10/26/20  1529 10/27/20  0421   WBC 9.37 7.82   HGB 12.5 12.5   HCT 38.9 40.5   * 107*   , INR   Recent Labs   Lab 10/26/20  1315   INR 1.5   , Lipid Panel No results for input(s): CHOL, HDL, LDLCALC, TRIG, CHOLHDL in the last 48 hours. and Troponin   Recent Labs   Lab 10/27/20  0011 10/27/20  0421 10/27/20  1402   TROPONINI 3.311* 2.426* 1.611*       Significant Imaging: Reviewed  Assessment and Plan:     IMPRESSION:  NSTEMI. Echo with regional wall motion abnormalities in the LAD territory.   CHF. rLVEF. Volume overload on exam.   LBBB.  Hypertension.  Controlled.  Noncompliance.    RECOMMENDATIONS:  1. Start aspirin.   2. Start Lasix 40 mg IV daily.   3. Discussed with patient's daughter. They may be opting for hospice care tomorrow.   4.  Discussed with Dr. Ragsdale.    Thank you for your consult. I will follow-up with patient. Please contact us if you have any additional questions.    Rodolfo Vasquez MD  Cardiology   [facility]

## 2020-10-27 NOTE — NURSING
89 yr old female   at bedside states that she had been on the floor foe a couple of days. He is her caregiver and he could not get her up. She did not want up because she didn't wont to fall again.       Rt buttock  Stage 2 injury with DTI to the perirectal area  Other light DTI noted     Right upper buttock stage 2 injury         Blister to the right hip             Left ischium 2x2x.1 healing with med wound discolored     Busing to the roight knee.      bilat abd fold with red yeast rash  Pt had been in urine and feces while on the floor.         bilat lower legs red and weeping   states she will not take meds for swelling   Recommendation   bilat  buttock and right hip Clean with chlorhexidine/ns.  Pat dry. Apply Medihoney and cover with mepilex.  abd folds and groin  Clean with chlorhexidine/ns  Pat dry. Apply antifungal powder daily.  bilat lower legs  Clean daily with chlorhexidine. Pat dry  And place on airflow pads for weeping   Turn reposition q2 as pt's condition will allow.  Bilat heel pillows   Float and elevate heels of bed with pillows.  Specialty bed  air mattress

## 2020-10-28 LAB
ALBUMIN SERPL BCP-MCNC: 2.7 G/DL (ref 3.5–5.2)
ALP SERPL-CCNC: 56 U/L (ref 55–135)
ALT SERPL W/O P-5'-P-CCNC: 31 U/L (ref 10–44)
ANION GAP SERPL CALC-SCNC: 9 MMOL/L (ref 8–16)
AORTIC ROOT ANNULUS: 2.92 CM
AORTIC VALVE CUSP SEPERATION: 1.36 CM
AST SERPL-CCNC: 72 U/L (ref 10–40)
AV INDEX (PROSTH): 0.38
AV MEAN GRADIENT: 6 MMHG
AV PEAK GRADIENT: 11 MMHG
AV VALVE AREA: 1.2 CM2
AV VELOCITY RATIO: 43.85
BASOPHILS # BLD AUTO: 0.05 K/UL (ref 0–0.2)
BASOPHILS NFR BLD: 0.8 % (ref 0–1.9)
BILIRUB SERPL-MCNC: 2.1 MG/DL (ref 0.1–1)
BNP SERPL-MCNC: 1309 PG/ML (ref 0–99)
BSA FOR ECHO PROCEDURE: 1.93 M2
BUN SERPL-MCNC: 23 MG/DL (ref 10–30)
CALCIUM SERPL-MCNC: 8.9 MG/DL (ref 8.7–10.5)
CHLORIDE SERPL-SCNC: 105 MMOL/L (ref 95–110)
CK SERPL-CCNC: 355 U/L (ref 20–180)
CK SERPL-CCNC: 450 U/L (ref 20–180)
CO2 SERPL-SCNC: 24 MMOL/L (ref 23–29)
CREAT SERPL-MCNC: 0.9 MG/DL (ref 0.5–1.4)
CV ECHO LV RWT: 0.47 CM
DIFFERENTIAL METHOD: ABNORMAL
DOP CALC AO PEAK VEL: 1.65 M/S
DOP CALC AO VTI: 33.46 CM
DOP CALC LVOT AREA: 3.2 CM2
DOP CALC LVOT DIAMETER: 2.01 CM
DOP CALC LVOT PEAK VEL: 72.36 M/S
DOP CALC LVOT STROKE VOLUME: 40.15 CM3
DOP CALCLVOT PEAK VEL VTI: 12.66 CM
E WAVE DECELERATION TIME: 242.81 MSEC
E/A RATIO: 0.83
E/E' RATIO: 22 M/S
ECHO LV POSTERIOR WALL: 1.17 CM (ref 0.6–1.1)
EOSINOPHIL # BLD AUTO: 0.2 K/UL (ref 0–0.5)
EOSINOPHIL NFR BLD: 4 % (ref 0–8)
ERYTHROCYTE [DISTWIDTH] IN BLOOD BY AUTOMATED COUNT: 14.6 % (ref 11.5–14.5)
EST. GFR  (AFRICAN AMERICAN): >60 ML/MIN/1.73 M^2
EST. GFR  (NON AFRICAN AMERICAN): 56.1 ML/MIN/1.73 M^2
FRACTIONAL SHORTENING: 40 % (ref 28–44)
GLUCOSE SERPL-MCNC: 127 MG/DL (ref 70–110)
HCT VFR BLD AUTO: 37 % (ref 37–48.5)
HGB BLD-MCNC: 12 G/DL (ref 12–16)
IMM GRANULOCYTES # BLD AUTO: 0.03 K/UL (ref 0–0.04)
IMM GRANULOCYTES NFR BLD AUTO: 0.5 % (ref 0–0.5)
INTERVENTRICULAR SEPTUM: 1.05 CM (ref 0.6–1.1)
LACTATE SERPL-SCNC: 1.6 MMOL/L (ref 0.5–1.9)
LACTATE SERPL-SCNC: 1.7 MMOL/L (ref 0.5–1.9)
LACTATE SERPL-SCNC: 1.7 MMOL/L (ref 0.5–1.9)
LACTATE SERPL-SCNC: 1.8 MMOL/L (ref 0.5–1.9)
LEFT ATRIUM SIZE: 3.8 CM
LEFT INTERNAL DIMENSION IN SYSTOLE: 2.97 CM (ref 2.1–4)
LEFT VENTRICLE MASS INDEX: 113 G/M2
LEFT VENTRICULAR INTERNAL DIMENSION IN DIASTOLE: 4.99 CM (ref 3.5–6)
LEFT VENTRICULAR MASS: 209.06 G
LV LATERAL E/E' RATIO: 22 M/S
LV SEPTAL E/E' RATIO: 22 M/S
LYMPHOCYTES # BLD AUTO: 1 K/UL (ref 1–4.8)
LYMPHOCYTES NFR BLD: 15.9 % (ref 18–48)
MAGNESIUM SERPL-MCNC: 1.8 MG/DL (ref 1.6–2.6)
MCH RBC QN AUTO: 33.4 PG (ref 27–31)
MCHC RBC AUTO-ENTMCNC: 32.4 G/DL (ref 32–36)
MCV RBC AUTO: 103 FL (ref 82–98)
MONOCYTES # BLD AUTO: 0.7 K/UL (ref 0.3–1)
MONOCYTES NFR BLD: 12.2 % (ref 4–15)
MV PEAK A VEL: 1.33 M/S
MV PEAK E VEL: 1.1 M/S
NEUTROPHILS # BLD AUTO: 4 K/UL (ref 1.8–7.7)
NEUTROPHILS NFR BLD: 66.6 % (ref 38–73)
NRBC BLD-RTO: 0 /100 WBC
PHOSPHATE SERPL-MCNC: 2.4 MG/DL (ref 2.7–4.5)
PISA TR MAX VEL: 2.33 M/S
PLATELET # BLD AUTO: 113 K/UL (ref 150–350)
PMV BLD AUTO: 10.2 FL (ref 9.2–12.9)
POTASSIUM SERPL-SCNC: 4.4 MMOL/L (ref 3.5–5.1)
PROT SERPL-MCNC: 6.6 G/DL (ref 6–8.4)
RA PRESSURE: 8 MMHG
RBC # BLD AUTO: 3.59 M/UL (ref 4–5.4)
SODIUM SERPL-SCNC: 138 MMOL/L (ref 136–145)
TDI LATERAL: 0.05 M/S
TDI SEPTAL: 0.05 M/S
TDI: 0.05 M/S
TR MAX PG: 22 MMHG
TV REST PULMONARY ARTERY PRESSURE: 30 MMHG
WBC # BLD AUTO: 6.05 K/UL (ref 3.9–12.7)

## 2020-10-28 PROCEDURE — 25000003 PHARM REV CODE 250: Performed by: INTERNAL MEDICINE

## 2020-10-28 PROCEDURE — 36415 COLL VENOUS BLD VENIPUNCTURE: CPT

## 2020-10-28 PROCEDURE — 83880 ASSAY OF NATRIURETIC PEPTIDE: CPT

## 2020-10-28 PROCEDURE — 82550 ASSAY OF CK (CPK): CPT

## 2020-10-28 PROCEDURE — 99232 SBSQ HOSP IP/OBS MODERATE 35: CPT | Mod: ,,, | Performed by: INTERNAL MEDICINE

## 2020-10-28 PROCEDURE — 63600175 PHARM REV CODE 636 W HCPCS: Performed by: INTERNAL MEDICINE

## 2020-10-28 PROCEDURE — 84100 ASSAY OF PHOSPHORUS: CPT

## 2020-10-28 PROCEDURE — 99900035 HC TECH TIME PER 15 MIN (STAT)

## 2020-10-28 PROCEDURE — 94761 N-INVAS EAR/PLS OXIMETRY MLT: CPT

## 2020-10-28 PROCEDURE — 25000003 PHARM REV CODE 250: Performed by: STUDENT IN AN ORGANIZED HEALTH CARE EDUCATION/TRAINING PROGRAM

## 2020-10-28 PROCEDURE — 27000221 HC OXYGEN, UP TO 24 HOURS

## 2020-10-28 PROCEDURE — 63600175 PHARM REV CODE 636 W HCPCS: Performed by: STUDENT IN AN ORGANIZED HEALTH CARE EDUCATION/TRAINING PROGRAM

## 2020-10-28 PROCEDURE — 85025 COMPLETE CBC W/AUTO DIFF WBC: CPT

## 2020-10-28 PROCEDURE — 99232 PR SUBSEQUENT HOSPITAL CARE,LEVL II: ICD-10-PCS | Mod: ,,, | Performed by: INTERNAL MEDICINE

## 2020-10-28 PROCEDURE — 80053 COMPREHEN METABOLIC PANEL: CPT

## 2020-10-28 PROCEDURE — 83735 ASSAY OF MAGNESIUM: CPT

## 2020-10-28 PROCEDURE — 83605 ASSAY OF LACTIC ACID: CPT

## 2020-10-28 PROCEDURE — 97110 THERAPEUTIC EXERCISES: CPT | Mod: CQ

## 2020-10-28 PROCEDURE — 21400001 HC TELEMETRY ROOM

## 2020-10-28 RX ADMIN — PIPERACILLIN SODIUM AND TAZOBACTAM SODIUM 3.38 G: 3; .375 INJECTION, POWDER, LYOPHILIZED, FOR SOLUTION INTRAVENOUS at 06:10

## 2020-10-28 RX ADMIN — PIPERACILLIN SODIUM AND TAZOBACTAM SODIUM 3.38 G: 3; .375 INJECTION, POWDER, LYOPHILIZED, FOR SOLUTION INTRAVENOUS at 12:10

## 2020-10-28 RX ADMIN — FUROSEMIDE 40 MG: 10 INJECTION, SOLUTION INTRAMUSCULAR; INTRAVENOUS at 11:10

## 2020-10-28 RX ADMIN — PIPERACILLIN SODIUM AND TAZOBACTAM SODIUM 3.38 G: 3; .375 INJECTION, POWDER, LYOPHILIZED, FOR SOLUTION INTRAVENOUS at 11:10

## 2020-10-28 RX ADMIN — POLYETHYLENE GLYCOL 3350 17 G: 17 POWDER, FOR SOLUTION ORAL at 11:10

## 2020-10-28 RX ADMIN — ENOXAPARIN SODIUM 40 MG: 30 INJECTION SUBCUTANEOUS at 06:10

## 2020-10-28 RX ADMIN — ASPIRIN 81 MG: 81 TABLET, CHEWABLE ORAL at 11:10

## 2020-10-28 NOTE — PLAN OF CARE
Patient's daughter plans to sign mother with [facility]. Needs hospital bed prior to discharge. Due to weather event may not be able to deliver per Margarita. Faxed records and spoke with Margarita. Later received call from Margarita saying that unable to admit due to weather.    10/28/20 0952   Discharge Reassessment   Assessment Type Discharge Planning Reassessment   Anticipated Discharge Disposition HospiceHome   Discharge Plan A Hospice/home   Discharge Plan B Hospice/home   DME Needed Upon Discharge  hospital bed   Patient choice form signed by patient/caregiver List with quality metrics by geographic area provided   Post-Acute Status   Post-Acute Authorization Hospice   Hospice Status Referrals Sent   Discharge Delays (!) Other  ([place]cl[place] weather)

## 2020-10-28 NOTE — PROGRESS NOTES
[facility]  Cardiology  Progress Note    Patient Name: Fracisco Pires  MRN: [id number]  Admission Date: 10/26/2020  Hospital Length of Stay: 2 days  Code Status: DNR   Attending Physician: Marga Ragsdale MD   Primary Care Physician: Rachel Millard MD  Expected Discharge Date:   Principal Problem:Sepsis    Subjective:       Interval History: Patient denies any chest pain. Reports some shortness of breath.     ROS   Denies any abdominal pain.   Objective:     Vital Signs (Most Recent):  Temp: 99.4 °F (37.4 °C) (10/28/20 1552)  Pulse: 99 (10/28/20 1552)  Resp: 18 (10/28/20 1552)  BP: (!) 119/59 (10/28/20 1552)  SpO2: 98 % (10/28/20 1552) Vital Signs (24h Range):  Temp:  [97.8 °F (36.6 °C)-99.9 °F (37.7 °C)] 99.4 °F (37.4 °C)  Pulse:  [] 99  Resp:  [16-20] 18  SpO2:  [94 %-99 %] 98 %  BP: (107-156)/(59-77) 119/59     Weight: 95 kg (209 lb 7 oz)  Body mass index is 40.9 kg/m².    SpO2: 98 %  O2 Device (Oxygen Therapy): nasal cannula      Intake/Output Summary (Last 24 hours) at 10/28/2020 1700  Last data filed at 10/28/2020 1549  Gross per 24 hour   Intake 360 ml   Output 1650 ml   Net -1290 ml       Lines/Drains/Airways     Drain                 Urethral Catheter 10/26/20 1535 Non-latex 16 Fr. 2 days          Peripheral Intravenous Line                 Peripheral IV - Single Lumen 10/26/20 1311 20 G;1 3/4 in Left Forearm 2 days         Peripheral IV - Single Lumen 10/26/20 1334 22 G Right Wrist 2 days                Scheduled Meds:   aspirin  81 mg Oral Daily    enoxaparin  40 mg Subcutaneous Q24H    furosemide (LASIX) IV  40 mg Intravenous Daily    piperacillin-tazobactam (ZOSYN) IVPB  3.375 g Intravenous Q8H    polyethylene glycol  17 g Oral Daily    vancomycin (VANCOCIN) IVPB  1,500 mg Intravenous Q24H     Continuous Infusions:  PRN Meds:.acetaminophen, magnesium oxide, magnesium oxide, melatonin, ondansetron, promethazine (PHENERGAN) IVPB, sodium chloride 0.9%, traMADoL, Pharmacy to  dose Vancomycin consult **AND** vancomycin - pharmacy to dose     Physical Exam  HEENT: Normocephalic, atraumatic, PERRL, Conjunctiva pink, no scleral icterus.   CVS: S1S2+, RRR, SM+, no rubs or gallops  LUNGS: Crackles+  ABDOMEN: Soft, NT, BS+  EXTREMITIES: No cyanosis, clubbing. +++ edema  NEURO:  Answers questions appropriately.       Significant Labs:   BMP:   Recent Labs   Lab 10/27/20  0421 10/28/20  0322    127*    138   K 3.9 4.4    105   CO2 21* 24   BUN 24 23   CREATININE 0.8 0.9   CALCIUM 9.0 8.9   MG 1.9 1.8   , CMP   Recent Labs   Lab 10/27/20  0421 10/28/20  0322    138   K 3.9 4.4    105   CO2 21* 24    127*   BUN 24 23   CREATININE 0.8 0.9   CALCIUM 9.0 8.9   PROT 6.7 6.6   ALBUMIN 2.8* 2.7*   BILITOT 2.4* 2.1*   ALKPHOS 54* 56   AST 95* 72*   ALT 30 31   ANIONGAP 11 9   ESTGFRAFRICA >60.0 >60.0   EGFRNONAA >60.0 56.1*   , CBC   Recent Labs   Lab 10/27/20  0421 10/28/20  0322   WBC 7.82 6.05   HGB 12.5 12.0   HCT 40.5 37.0   * 113*   , INR No results for input(s): INR, PROTIME in the last 48 hours., Lipid Panel No results for input(s): CHOL, HDL, LDLCALC, TRIG, CHOLHDL in the last 48 hours. and Troponin   Recent Labs   Lab 10/27/20  0011 10/27/20  0421 10/27/20  1402   TROPONINI 3.311* 2.426* 1.611*       Significant Imaging: Reviewed  Assessment and Plan:     IMPRESSION:  NSTEMI. Echo with regional wall motion abnormalities in the LAD territory.   CHF. rLVEF. Volume overload on exam.   LBBB.  Hypertension.  Controlled.  Noncompliance.      PLAN:  1. Plans noted for hospice care.   2. Will sign off.   3. Please reconsult if needed.   4. Discussed with JOSH and Dr Ragsdale.         Rodolfo Vasquez MD  Cardiology  [facility]

## 2020-10-28 NOTE — PROGRESS NOTES
Count includes the [facility] Medicine  Progress Note    Patient name: Fracisco Pires  MRN: [id number]  Admit Date: 10/26/2020   LOS: 2 days     SUBJECTIVE:     Principal problem: Sepsis    Interval History:   10/28:  Patient appeared more awake interval progress in breathing with IV diuresis  Cardiology consult appreciated NSTEMI with heart failure advised medical management with hospice care  Echo showed low EF 25% with elevated central venous pressure grade 1 diastolic dysfunction  Plan discussed with daughter and patient,  consulted for home hospice  Awaiting for placement     10/27 Pt feels tired , wanted to sleep, but awake oriented to name ,placed not time but knws her   Lactate trending up ,audible wheeze on gentle hydration  Troponin improving, denies CP  Consult cardiology for optimize CHF mx with sepsis  Wound care eval appreciated for decubiti    Scheduled Meds:   aspirin  81 mg Oral Daily    enoxaparin  40 mg Subcutaneous Q24H    furosemide (LASIX) IV  40 mg Intravenous Daily    piperacillin-tazobactam (ZOSYN) IVPB  3.375 g Intravenous Q8H    polyethylene glycol  17 g Oral Daily    vancomycin (VANCOCIN) IVPB  1,500 mg Intravenous Q24H     Continuous Infusions:    PRN Meds:acetaminophen, magnesium oxide, magnesium oxide, melatonin, ondansetron, promethazine (PHENERGAN) IVPB, sodium chloride 0.9%, traMADoL, Pharmacy to dose Vancomycin consult **AND** vancomycin - pharmacy to dose    Review of patient's allergies indicates:   Allergen Reactions    No known drug allergies        Review of Systems: As per interval history    OBJECTIVE:     Vital Signs (Most Recent)  Temp: 99.9 °F (37.7 °C) (10/28/20 1057)  Pulse: 100 (10/28/20 1057)  Resp: 20 (10/28/20 1057)  BP: (!) 156/70 (10/28/20 1057)  SpO2: 99 % (10/28/20 1057)    Vital Signs Range (Last 24H):  Temp:  [97.8 °F (36.6 °C)-99.9 °F (37.7 °C)]   Pulse:  []   Resp:  [16-20]   BP: (107-156)/(65-77)   SpO2:  [94 %-100 %]      I & O (Last 24H):    Intake/Output Summary (Last 24 hours) at 10/28/2020 1525  Last data filed at 10/28/2020 1100  Gross per 24 hour   Intake 240 ml   Output 150 ml   Net 90 ml       Physical Exam:  General: Patient resting comfortably, fatigue+. Appears as stated age. Calm on 2l nc, audible wheeze  Eyes: No conjunctival injection. No scleral icterus.  ENT: heard of Hearing . No discharge from ears. No nasal discharge.   Neck: Supple, trachea midline. No JVD  CVS: RRR.   Lungs:Decreased breath sound bl  ,  Wheezing+  basal crackles. Mild  acute respiratory distress  Abdomen:  Soft, nontender and nondistended.  No organomegaly, mccarty with dark urine  Neuro: AOx3. Moves all extremities. Follows commands. Responds appropriately   Skin:  No rash or erythema noted  BLE edema 3+ with tenderness  stage 2 buttock and hip. bilat lower legs weeping. and yeast to the folds   Laboratory:  CBC:   Recent Labs   Lab 10/28/20  0322   WBC 6.05   RBC 3.59*   HGB 12.0   HCT 37.0   *   *   MCH 33.4*   MCHC 32.4     CMP:   Recent Labs   Lab 10/28/20  0322   *   CALCIUM 8.9   ALBUMIN 2.7*   PROT 6.6      K 4.4   CO2 24      BUN 23   CREATININE 0.9   ALKPHOS 56   ALT 31   AST 72*   BILITOT 2.1*       Diagnostic Results:  US: Reviewed   Cholelithiasis.    ASSESSMENT/PLAN:         Active Hospital Problems    Diagnosis  POA    *Sepsis(995.91) [A41.9]  Yes     Dx updated per 2019 IMO Load      CHF exacerbation [I50.9]  Unknown     Possibly 2/2 recent MI      NSTEMI (non-ST elevated myocardial infarction) [I21.4]  Unknown    Non-traumatic rhabdomyolysis [M62.82]  Yes    Fall [W19.XXXA]  Yes    Acute cystitis [N30.00]  Yes    Venous stasis dermatitis of both lower extremities [I87.2]  Yes    Essential hypertension [I10]  Yes    Noncompliance with medication regimen [Z91.14]  Not Applicable    ILD (interstitial lung disease) [J84.9]  Yes    Excessive tear production of both lacrimal glands  [H04.203]  Yes    Bilateral lower extremity edema [R60.0]  Yes    Debility [R53.81]  Yes    CKD (chronic kidney disease) stage 3, GFR 30-59 ml/min [N18.30]  Yes     Chronic    Chronic diastolic congestive heart failure [I50.32]  Yes     Chronic    Sensorineural hearing loss (SNHL) of both ears [H90.3]  Yes     Chronic    Complete left bundle branch block [I44.7]  Yes    Hypertension [I10]  Yes     Chronic      Resolved Hospital Problems   No resolved problems to display.         Plan:   C/w broad spectrum abx vanco zosyn  D/C Gentle hydration  BNP remains elevated  F/u cardiology consult   Continue with aspirin and lasix iv  CK trending down   Echo showedEF 25% with elevated central venous pressure grade 1 diastolic dysfunction  Mild to moderate MR  C/W pt/ot IP    VTE Risk Mitigation (From admission, onward)         Ordered     enoxaparin injection 40 mg  Every 24 hours      10/26/20 1932     IP VTE HIGH RISK PATIENT  Once      10/26/20 1932     Place sequential compression device  Until discontinued      10/26/20 1932                  Department Hospital Medicine  [facility]  Marga Ragsdale MD  Date of service: 10/28/2020

## 2020-10-28 NOTE — PLAN OF CARE
10/28/20 1039   PRE-TX-O2   O2 Device (Oxygen Therapy) nasal cannula   $ Is the patient on Low Flow Oxygen? Yes   Flow (L/min) 3   SpO2 99 %   Pulse Oximetry Type Intermittent   $ Pulse Oximetry - Multiple Charge Pulse Oximetry - Multiple   Pulse 102   Resp 16   Respiratory Evaluation   $ Care Plan Tech Time 15 min

## 2020-10-28 NOTE — PLAN OF CARE
Problem: Physical Therapy Goal  Goal: Physical Therapy Goal  Description: Goals to be met by: discharge     Patient will increase functional independence with mobility by performin. Supine to sit with Moderate Assistance  2. Sit to supine with Moderate Assistance  3. Rolling to Left and Right with Moderate Assistance.  4. Sit to stand transfer with Moderate Assistance  5. Bed to chair transfer with Moderate Assistance using Rolling Walker  6. Gait  x 15 feet with Moderate Assistance using Rolling Walker.     Outcome: Ongoing, Progressing   Pt continues to progress towards goals.

## 2020-10-28 NOTE — PT/OT/SLP PROGRESS
Occupational Therapy      Patient Name:  Fracisco Pires   MRN:  [id number]    Patient not seen today secondary to Patient fatigue. Will follow-up tomorrow.    Enio Oviedo OT  10/28/2020

## 2020-10-28 NOTE — PT/OT/SLP PROGRESS
Physical Therapy Treatment    Patient Name:  Fracisco Pires   MRN:  [id number]    Recommendations:     Discharge Recommendations:  rehabilitation facility   Discharge Equipment Recommendations: bedside commode, walker, rolling   Barriers to discharge: None    Assessment:     Fracisco Pires is a 89 y.o. female admitted with a medical diagnosis of Sepsis.  She presents with the following impairments/functional limitations:  weakness, impaired endurance, impaired self care skills, impaired functional mobilty, gait instability, impaired balance, decreased lower extremity function, decreased safety awareness, pain, impaired skin, edema.    Patient presents resting in bed with HOB elevated; agreeable to PT treatment. Patient declined EOB mobility and was only agreeable to therex in bed with BLEs. Patient's therex limited 2/2 BLE pain. Continue with PT and POC.    Rehab Prognosis: Fair; patient would benefit from acute skilled PT services to address these deficits and reach maximum level of function.    Recent Surgery: * No surgery found *      Plan:     During this hospitalization, patient to be seen 6 x/week to address the identified rehab impairments via gait training, therapeutic activities, therapeutic exercises and progress toward the following goals:    · Plan of Care Expires:  11/27/20    Subjective     Chief Complaint: BLE pain  Patient/Family Comments/goals:   Pain/Comfort:  · Pain Rating 1: (Pt did not quantify)  · Location - Side 1: Bilateral  · Location - Orientation 1: lower  · Location 1: leg  · Pain Addressed 1: Reposition, Distraction, Cessation of Activity      Objective:     Communicated with RN prior to session.  Patient found HOB elevated with telemetry, peripheral IV, mccarty catheter, bed alarm upon PT entry to room.     General Precautions: Standard, fall   Orthopedic Precautions:    Braces:         AM-PAC 6 CLICK MOBILITY          Therapeutic Activities and Exercises:   Pt performed the following  exercises while resting in bed with HOB elevated with BLEs x 10 reps each (patient required AAROM for some exercises):  -ankle pumps  -quad sets  -heel slides  -hip abduction/adduction      Patient left HOB elevated with all lines intact, call button in reach and bed alarm on..    GOALS:   Multidisciplinary Problems     Physical Therapy Goals        Problem: Physical Therapy Goal    Goal Priority Disciplines Outcome Goal Variances Interventions   Physical Therapy Goal     PT, PT/OT Ongoing, Progressing     Description: Goals to be met by: discharge     Patient will increase functional independence with mobility by performin. Supine to sit with Moderate Assistance  2. Sit to supine with Moderate Assistance  3. Rolling to Left and Right with Moderate Assistance.  4. Sit to stand transfer with Moderate Assistance  5. Bed to chair transfer with Moderate Assistance using Rolling Walker  6. Gait  x 15 feet with Moderate Assistance using Rolling Walker.                      Time Tracking:     PT Received On: 10/28/20  PT Start Time: 836     PT Stop Time: 852  PT Total Time (min): 16 min     Billable Minutes: Therapeutic Exercise 16    Treatment Type: Treatment  PT/PTA: PTA     PTA Visit Number: 1     Alivia Sepulveda PTA  10/28/2020

## 2020-10-29 LAB
ALBUMIN SERPL BCP-MCNC: 2.4 G/DL (ref 3.5–5.2)
ALP SERPL-CCNC: 55 U/L (ref 55–135)
ALT SERPL W/O P-5'-P-CCNC: 26 U/L (ref 10–44)
ANION GAP SERPL CALC-SCNC: 11 MMOL/L (ref 8–16)
AST SERPL-CCNC: 50 U/L (ref 10–40)
BASOPHILS # BLD AUTO: 0.04 K/UL (ref 0–0.2)
BASOPHILS NFR BLD: 0.6 % (ref 0–1.9)
BILIRUB SERPL-MCNC: 1.9 MG/DL (ref 0.1–1)
BUN SERPL-MCNC: 20 MG/DL (ref 10–30)
CALCIUM SERPL-MCNC: 8.8 MG/DL (ref 8.7–10.5)
CHLORIDE SERPL-SCNC: 101 MMOL/L (ref 95–110)
CK SERPL-CCNC: 142 U/L (ref 20–180)
CK SERPL-CCNC: 150 U/L (ref 20–180)
CK SERPL-CCNC: 175 U/L (ref 20–180)
CO2 SERPL-SCNC: 26 MMOL/L (ref 23–29)
CREAT SERPL-MCNC: 0.8 MG/DL (ref 0.5–1.4)
DIFFERENTIAL METHOD: ABNORMAL
EOSINOPHIL # BLD AUTO: 0.2 K/UL (ref 0–0.5)
EOSINOPHIL NFR BLD: 3.4 % (ref 0–8)
ERYTHROCYTE [DISTWIDTH] IN BLOOD BY AUTOMATED COUNT: 14.5 % (ref 11.5–14.5)
EST. GFR  (AFRICAN AMERICAN): >60 ML/MIN/1.73 M^2
EST. GFR  (NON AFRICAN AMERICAN): >60 ML/MIN/1.73 M^2
GLUCOSE SERPL-MCNC: 123 MG/DL (ref 70–110)
HCT VFR BLD AUTO: 35.6 % (ref 37–48.5)
HGB BLD-MCNC: 11.9 G/DL (ref 12–16)
IMM GRANULOCYTES # BLD AUTO: 0.06 K/UL (ref 0–0.04)
IMM GRANULOCYTES NFR BLD AUTO: 0.8 % (ref 0–0.5)
LACTATE SERPL-SCNC: 1.3 MMOL/L (ref 0.5–1.9)
LACTATE SERPL-SCNC: 1.4 MMOL/L (ref 0.5–1.9)
LYMPHOCYTES # BLD AUTO: 0.9 K/UL (ref 1–4.8)
LYMPHOCYTES NFR BLD: 13.3 % (ref 18–48)
MAGNESIUM SERPL-MCNC: 1.7 MG/DL (ref 1.6–2.6)
MAGNESIUM SERPL-MCNC: 1.8 MG/DL (ref 1.6–2.6)
MCH RBC QN AUTO: 33.9 PG (ref 27–31)
MCHC RBC AUTO-ENTMCNC: 33.4 G/DL (ref 32–36)
MCV RBC AUTO: 101 FL (ref 82–98)
MONOCYTES # BLD AUTO: 0.9 K/UL (ref 0.3–1)
MONOCYTES NFR BLD: 12 % (ref 4–15)
NEUTROPHILS # BLD AUTO: 4.9 K/UL (ref 1.8–7.7)
NEUTROPHILS NFR BLD: 69.9 % (ref 38–73)
NRBC BLD-RTO: 0 /100 WBC
PHOSPHATE SERPL-MCNC: 2.4 MG/DL (ref 2.7–4.5)
PLATELET # BLD AUTO: 114 K/UL (ref 150–350)
PMV BLD AUTO: 10.4 FL (ref 9.2–12.9)
POTASSIUM SERPL-SCNC: 3.6 MMOL/L (ref 3.5–5.1)
POTASSIUM SERPL-SCNC: 3.9 MMOL/L (ref 3.5–5.1)
PROT SERPL-MCNC: 6.1 G/DL (ref 6–8.4)
RBC # BLD AUTO: 3.51 M/UL (ref 4–5.4)
SODIUM SERPL-SCNC: 138 MMOL/L (ref 136–145)
VANCOMYCIN TROUGH SERPL-MCNC: 17.5 UG/ML (ref 10–22)
WBC # BLD AUTO: 7.06 K/UL (ref 3.9–12.7)

## 2020-10-29 PROCEDURE — 83735 ASSAY OF MAGNESIUM: CPT

## 2020-10-29 PROCEDURE — 63600175 PHARM REV CODE 636 W HCPCS: Performed by: STUDENT IN AN ORGANIZED HEALTH CARE EDUCATION/TRAINING PROGRAM

## 2020-10-29 PROCEDURE — 82550 ASSAY OF CK (CPK): CPT

## 2020-10-29 PROCEDURE — 84132 ASSAY OF SERUM POTASSIUM: CPT

## 2020-10-29 PROCEDURE — 82550 ASSAY OF CK (CPK): CPT | Mod: 91

## 2020-10-29 PROCEDURE — 80202 ASSAY OF VANCOMYCIN: CPT

## 2020-10-29 PROCEDURE — 80053 COMPREHEN METABOLIC PANEL: CPT

## 2020-10-29 PROCEDURE — 63600175 PHARM REV CODE 636 W HCPCS: Performed by: INTERNAL MEDICINE

## 2020-10-29 PROCEDURE — 83735 ASSAY OF MAGNESIUM: CPT | Mod: 91

## 2020-10-29 PROCEDURE — 85025 COMPLETE CBC W/AUTO DIFF WBC: CPT

## 2020-10-29 PROCEDURE — 83605 ASSAY OF LACTIC ACID: CPT | Mod: 91

## 2020-10-29 PROCEDURE — 25000003 PHARM REV CODE 250: Performed by: INTERNAL MEDICINE

## 2020-10-29 PROCEDURE — 25000003 PHARM REV CODE 250: Performed by: STUDENT IN AN ORGANIZED HEALTH CARE EDUCATION/TRAINING PROGRAM

## 2020-10-29 PROCEDURE — 84100 ASSAY OF PHOSPHORUS: CPT

## 2020-10-29 PROCEDURE — 21400001 HC TELEMETRY ROOM

## 2020-10-29 PROCEDURE — 83605 ASSAY OF LACTIC ACID: CPT

## 2020-10-29 PROCEDURE — 36415 COLL VENOUS BLD VENIPUNCTURE: CPT

## 2020-10-29 RX ORDER — POTASSIUM CHLORIDE 20 MEQ/1
20 TABLET, EXTENDED RELEASE ORAL
Status: DISCONTINUED | OUTPATIENT
Start: 2020-10-29 | End: 2020-10-30 | Stop reason: HOSPADM

## 2020-10-29 RX ORDER — MAGNESIUM SULFATE 1 G/100ML
1 INJECTION INTRAVENOUS
Status: DISCONTINUED | OUTPATIENT
Start: 2020-10-29 | End: 2020-10-30 | Stop reason: HOSPADM

## 2020-10-29 RX ORDER — LANOLIN ALCOHOL/MO/W.PET/CERES
800 CREAM (GRAM) TOPICAL
Status: DISCONTINUED | OUTPATIENT
Start: 2020-10-29 | End: 2020-10-30 | Stop reason: HOSPADM

## 2020-10-29 RX ORDER — POTASSIUM CHLORIDE 20 MEQ/1
40 TABLET, EXTENDED RELEASE ORAL
Status: DISCONTINUED | OUTPATIENT
Start: 2020-10-29 | End: 2020-10-30 | Stop reason: HOSPADM

## 2020-10-29 RX ORDER — POTASSIUM CHLORIDE 7.45 MG/ML
20 INJECTION INTRAVENOUS
Status: DISCONTINUED | OUTPATIENT
Start: 2020-10-29 | End: 2020-10-30 | Stop reason: HOSPADM

## 2020-10-29 RX ORDER — POTASSIUM CHLORIDE 7.45 MG/ML
40 INJECTION INTRAVENOUS
Status: DISCONTINUED | OUTPATIENT
Start: 2020-10-29 | End: 2020-10-30 | Stop reason: HOSPADM

## 2020-10-29 RX ORDER — DOXYCYCLINE 100 MG/1
100 CAPSULE ORAL EVERY 12 HOURS
Status: DISCONTINUED | OUTPATIENT
Start: 2020-10-29 | End: 2020-10-30 | Stop reason: HOSPADM

## 2020-10-29 RX ORDER — MAGNESIUM SULFATE HEPTAHYDRATE 40 MG/ML
4 INJECTION, SOLUTION INTRAVENOUS
Status: DISCONTINUED | OUTPATIENT
Start: 2020-10-29 | End: 2020-10-30 | Stop reason: HOSPADM

## 2020-10-29 RX ORDER — CALCIUM CHLORIDE IN 0.9 % NACL 1 G/100 ML
1 INTRAVENOUS SOLUTION, PIGGYBACK (ML) INTRAVENOUS
Status: DISCONTINUED | OUTPATIENT
Start: 2020-10-29 | End: 2020-10-30 | Stop reason: HOSPADM

## 2020-10-29 RX ORDER — MAGNESIUM SULFATE HEPTAHYDRATE 40 MG/ML
2 INJECTION, SOLUTION INTRAVENOUS
Status: DISCONTINUED | OUTPATIENT
Start: 2020-10-29 | End: 2020-10-30 | Stop reason: HOSPADM

## 2020-10-29 RX ADMIN — MAGNESIUM OXIDE 800 MG: 400 TABLET ORAL at 11:10

## 2020-10-29 RX ADMIN — FUROSEMIDE 40 MG: 10 INJECTION, SOLUTION INTRAMUSCULAR; INTRAVENOUS at 09:10

## 2020-10-29 RX ADMIN — MAGNESIUM OXIDE 800 MG: 400 TABLET ORAL at 04:10

## 2020-10-29 RX ADMIN — ASPIRIN 81 MG: 81 TABLET, CHEWABLE ORAL at 09:10

## 2020-10-29 RX ADMIN — PIPERACILLIN SODIUM AND TAZOBACTAM SODIUM 3.38 G: 3; .375 INJECTION, POWDER, LYOPHILIZED, FOR SOLUTION INTRAVENOUS at 01:10

## 2020-10-29 RX ADMIN — ENOXAPARIN SODIUM 40 MG: 30 INJECTION SUBCUTANEOUS at 04:10

## 2020-10-29 RX ADMIN — VANCOMYCIN HYDROCHLORIDE 1500 MG: 1 INJECTION, POWDER, LYOPHILIZED, FOR SOLUTION INTRAVENOUS at 01:10

## 2020-10-29 RX ADMIN — PIPERACILLIN SODIUM AND TAZOBACTAM SODIUM 3.38 G: 3; .375 INJECTION, POWDER, LYOPHILIZED, FOR SOLUTION INTRAVENOUS at 04:10

## 2020-10-29 RX ADMIN — POTASSIUM CHLORIDE 40 MEQ: 20 TABLET, EXTENDED RELEASE ORAL at 11:10

## 2020-10-29 RX ADMIN — PIPERACILLIN SODIUM AND TAZOBACTAM SODIUM 3.38 G: 3; .375 INJECTION, POWDER, LYOPHILIZED, FOR SOLUTION INTRAVENOUS at 09:10

## 2020-10-29 RX ADMIN — POLYETHYLENE GLYCOL 3350 17 G: 17 POWDER, FOR SOLUTION ORAL at 09:10

## 2020-10-29 NOTE — PLAN OF CARE
Problem: Occupational Therapy Goal  Goal: Occupational Therapy Goal  Description: Goals to be met by:discharge    Patient will increase functional independence with ADLs by performing:    Feeding with Modified [place].  UE Dressing with Supervision.  LE Dressing with Minimal Assistance.  Grooming while seated with Modified [place].  Toileting from bedside commode with Minimal Assistance for hygiene and clothing management.   Bathing from  sitting at sink with Minimal Assistance.  Toilet transfer to bedside commode with Minimal Assistance.  Upper extremity exercise program 2 x 10 reps per handout, with supervision.    Outcome: Ongoing, Not Progressing     Car Plan discontinued. Plan is for patient to d/c home with hospice services

## 2020-10-29 NOTE — PT/OT/SLP DISCHARGE
Physical Therapy Discharge Summary    Name: Fracisco Pires  MRN: [id number]   Principal Problem: Sepsis     Patient Discharged from acute Physical Therapy on 10/29/2020.  Please refer to prior PT noted date on 10/28/20 for functional status.     Assessment:     Pt going hospice.    Objective:     GOALS:   Multidisciplinary Problems     Physical Therapy Goals     Not on file          Multidisciplinary Problems (Resolved)        Problem: Physical Therapy Goal    Goal Priority Disciplines Outcome Goal Variances Interventions   Physical Therapy Goal   (Resolved)     PT, PT/OT Met     Description: Goals to be met by: discharge     Patient will increase functional independence with mobility by performin. Supine to sit with Moderate Assistance  2. Sit to supine with Moderate Assistance  3. Rolling to Left and Right with Moderate Assistance.  4. Sit to stand transfer with Moderate Assistance  5. Bed to chair transfer with Moderate Assistance using Rolling Walker  6. Gait  x 15 feet with Moderate Assistance using Rolling Walker.                      Reasons for Discontinuation of Therapy Services  Transfer to alternate level of care.      Plan:     Patient Discharged to: Palliative Care/Hospice.    Irene Centeno, PT  10/29/2020

## 2020-10-29 NOTE — NURSING
Pt , having 10 pvc's per min, running Dab[place]., Dr Ragsdale notified, new orders received and implemented

## 2020-10-29 NOTE — PT/OT/SLP DISCHARGE
Occupational Therapy Discharge Summary    Fracisco Pires  MRN: [id number]   Principal Problem: Sepsis      Patient Discharged from acute Occupational Therapy on 10/29/2020.  Equipment Recommended: Hospital Bed    Assessment:      Patient is no longer making progress.    Objective:     GOALS:   Multidisciplinary Problems     Occupational Therapy Goals        Problem: Occupational Therapy Goal    Goal Priority Disciplines Outcome Interventions   Occupational Therapy Goal     OT, PT/OT Ongoing, Not Progressing Care Plan discontinued. Plan is to d/c home with hospice services.   Description: Goals to be met by:discharge    Patient will increase functional independence with ADLs by performing:    Feeding with Modified [place].  UE Dressing with Supervision.  LE Dressing with Minimal Assistance.  Grooming while seated with Modified [place].  Toileting from bedside commode with Minimal Assistance for hygiene and clothing management.   Bathing from  sitting at sink with Minimal Assistance.  Toilet transfer to bedside commode with Minimal Assistance.  Upper extremity exercise program 2 x 10 reps per handout, with supervision.                     Reasons for Discontinuation of Therapy Services  Therapist determines that the patient will no longer benefit from therapy services.      Plan:     Patient Discharged to: Home with Hospice Services    Enio Oviedo OT  10/29/2020

## 2020-10-30 VITALS
DIASTOLIC BLOOD PRESSURE: 57 MMHG | RESPIRATION RATE: 17 BRPM | WEIGHT: 209.44 LBS | TEMPERATURE: 98 F | OXYGEN SATURATION: 99 % | BODY MASS INDEX: 41.12 KG/M2 | HEART RATE: 88 BPM | HEIGHT: 60 IN | SYSTOLIC BLOOD PRESSURE: 110 MMHG

## 2020-10-30 LAB
ALBUMIN SERPL BCP-MCNC: 2.3 G/DL (ref 3.5–5.2)
ALP SERPL-CCNC: 51 U/L (ref 55–135)
ALT SERPL W/O P-5'-P-CCNC: 23 U/L (ref 10–44)
ANION GAP SERPL CALC-SCNC: 9 MMOL/L (ref 8–16)
AST SERPL-CCNC: 36 U/L (ref 10–40)
BASOPHILS # BLD AUTO: 0.08 K/UL (ref 0–0.2)
BASOPHILS NFR BLD: 1.3 % (ref 0–1.9)
BILIRUB SERPL-MCNC: 1.9 MG/DL (ref 0.1–1)
BUN SERPL-MCNC: 17 MG/DL (ref 10–30)
CALCIUM SERPL-MCNC: 8.2 MG/DL (ref 8.7–10.5)
CHLORIDE SERPL-SCNC: 101 MMOL/L (ref 95–110)
CK SERPL-CCNC: 109 U/L (ref 20–180)
CO2 SERPL-SCNC: 29 MMOL/L (ref 23–29)
CREAT SERPL-MCNC: 0.8 MG/DL (ref 0.5–1.4)
DIFFERENTIAL METHOD: ABNORMAL
EOSINOPHIL # BLD AUTO: 0.3 K/UL (ref 0–0.5)
EOSINOPHIL NFR BLD: 4.5 % (ref 0–8)
ERYTHROCYTE [DISTWIDTH] IN BLOOD BY AUTOMATED COUNT: 14.5 % (ref 11.5–14.5)
EST. GFR  (AFRICAN AMERICAN): >60 ML/MIN/1.73 M^2
EST. GFR  (NON AFRICAN AMERICAN): >60 ML/MIN/1.73 M^2
GLUCOSE SERPL-MCNC: 106 MG/DL (ref 70–110)
HCT VFR BLD AUTO: 38 % (ref 37–48.5)
HGB BLD-MCNC: 12.1 G/DL (ref 12–16)
IMM GRANULOCYTES # BLD AUTO: 0.05 K/UL (ref 0–0.04)
IMM GRANULOCYTES NFR BLD AUTO: 0.8 % (ref 0–0.5)
LYMPHOCYTES # BLD AUTO: 1.2 K/UL (ref 1–4.8)
LYMPHOCYTES NFR BLD: 18.4 % (ref 18–48)
MAGNESIUM SERPL-MCNC: 1.9 MG/DL (ref 1.6–2.6)
MCH RBC QN AUTO: 33 PG (ref 27–31)
MCHC RBC AUTO-ENTMCNC: 31.8 G/DL (ref 32–36)
MCV RBC AUTO: 104 FL (ref 82–98)
MONOCYTES # BLD AUTO: 0.8 K/UL (ref 0.3–1)
MONOCYTES NFR BLD: 12.5 % (ref 4–15)
NEUTROPHILS # BLD AUTO: 3.9 K/UL (ref 1.8–7.7)
NEUTROPHILS NFR BLD: 62.5 % (ref 38–73)
NRBC BLD-RTO: 0 /100 WBC
PHOSPHATE SERPL-MCNC: 2.6 MG/DL (ref 2.7–4.5)
PLATELET # BLD AUTO: 132 K/UL (ref 150–350)
PMV BLD AUTO: 11 FL (ref 9.2–12.9)
POTASSIUM SERPL-SCNC: 4.1 MMOL/L (ref 3.5–5.1)
PROT SERPL-MCNC: 6.1 G/DL (ref 6–8.4)
RBC # BLD AUTO: 3.67 M/UL (ref 4–5.4)
SODIUM SERPL-SCNC: 139 MMOL/L (ref 136–145)
WBC # BLD AUTO: 6.26 K/UL (ref 3.9–12.7)

## 2020-10-30 PROCEDURE — 94761 N-INVAS EAR/PLS OXIMETRY MLT: CPT

## 2020-10-30 PROCEDURE — 84100 ASSAY OF PHOSPHORUS: CPT

## 2020-10-30 PROCEDURE — 25000003 PHARM REV CODE 250: Performed by: STUDENT IN AN ORGANIZED HEALTH CARE EDUCATION/TRAINING PROGRAM

## 2020-10-30 PROCEDURE — 85025 COMPLETE CBC W/AUTO DIFF WBC: CPT

## 2020-10-30 PROCEDURE — 63600175 PHARM REV CODE 636 W HCPCS: Performed by: INTERNAL MEDICINE

## 2020-10-30 PROCEDURE — 25000003 PHARM REV CODE 250: Performed by: INTERNAL MEDICINE

## 2020-10-30 PROCEDURE — 83735 ASSAY OF MAGNESIUM: CPT

## 2020-10-30 PROCEDURE — 63600175 PHARM REV CODE 636 W HCPCS: Performed by: STUDENT IN AN ORGANIZED HEALTH CARE EDUCATION/TRAINING PROGRAM

## 2020-10-30 PROCEDURE — 27000221 HC OXYGEN, UP TO 24 HOURS

## 2020-10-30 PROCEDURE — 82550 ASSAY OF CK (CPK): CPT

## 2020-10-30 PROCEDURE — 36415 COLL VENOUS BLD VENIPUNCTURE: CPT

## 2020-10-30 PROCEDURE — 80053 COMPREHEN METABOLIC PANEL: CPT

## 2020-10-30 RX ORDER — NAPROXEN SODIUM 220 MG/1
81 TABLET, FILM COATED ORAL DAILY
Qty: 30 TABLET | Refills: 0 | Status: SHIPPED | OUTPATIENT
Start: 2020-10-31 | End: 2021-10-31

## 2020-10-30 RX ORDER — FUROSEMIDE 20 MG/1
40 TABLET ORAL DAILY
Qty: 90 TABLET | Refills: 3 | Status: SHIPPED | OUTPATIENT
Start: 2020-10-30

## 2020-10-30 RX ORDER — DOXYCYCLINE 100 MG/1
100 CAPSULE ORAL EVERY 12 HOURS
Qty: 14 CAPSULE | Refills: 0 | Status: SHIPPED | OUTPATIENT
Start: 2020-10-30 | End: 2020-11-06

## 2020-10-30 RX ADMIN — ASPIRIN 81 MG: 81 TABLET, CHEWABLE ORAL at 09:10

## 2020-10-30 RX ADMIN — ENOXAPARIN SODIUM 40 MG: 30 INJECTION SUBCUTANEOUS at 05:10

## 2020-10-30 RX ADMIN — DOXYCYCLINE HYCLATE 100 MG: 100 CAPSULE ORAL at 09:10

## 2020-10-30 RX ADMIN — POLYETHYLENE GLYCOL 3350 17 G: 17 POWDER, FOR SOLUTION ORAL at 09:10

## 2020-10-30 NOTE — DISCHARGE SUMMARY
[facility] Medicine  Discharge Summary      Patient Name: Fracisco Pires  MRN: [id number]  Admission Date: 10/26/2020  Hospital Length of Stay: 4 days  Discharge Date and Time:  10/30/2020 6:53 PM  Attending Physician: Marga Ragsdale MD   Discharging Provider: Marga Ragsdale MD  Primary Care Provider: Rachel Millard MD      HPI:   89-year-old female history of hypertension, hyperlipidemia, NSTEMI, prior multiple hospitalizations for sepsis due to cellulitis or UTI, chronic diastolic dysfunction, interstitial fibrosis, brought in to hospital today  or concerns of sepsis after being found on ground by PCP during home visit.  Patient poor historian.  States that she feels okay and has no specific complaints other than feeling cold and tired.  She denies chest pain, palpitations, shortness of breath, cough, abdominal pain, nausea, vomiting, fever, chills.  She does not know how she got to the ground.     Per primary care physician progress note dated today, patient was on the floor for 2 days without eating and only minimal fluid intake.  She most slept over those 2 days.  Patient and family were initially hesitant to go to the hospital PCP offered hospice services however family decided to bring to ED for evaluation of reversible causes of the patient's fatigue and confusion.    Spoke patient's daughter Maris who states that on Thursday patient had fall to ground and paramedics were called.  At that time she blood pressures 80s over 50s but improved prior to EMS departure.  Patient declined transport to hospital.  Home visit was arranged with her primary care doctor.  Maris states that patient did not fall during this episode.  She slid to the ground with assistance  and they thought it would be best if she remained on the ground to rest and avoid future falls.  He put a pad and blankets on the ground for her.  Daughter also states that patient would like to be DNR.  Discussed  code status with is April who states she would not want she is to keep her alive.    Spoke to ED physician Dr. Trujillo who states that patient had mildly low blood pressures 90s over 50s on ED arrival that improved with bolus of fluid and has been stable since.  He states she was covered in urine and feces.  UA consistent with urine infection.  CT abdomen showed questionable gallstones dilated common bile duct so he states that he ordered ultrasound for further evaluation.         * No surgery found *      Hospital Course:   Patient initially admitted to telemetry for sepsis with lactic acidosis, noted to have NSTEMI, with regional wall motion abnormalities on echocardiogram in LAD territory with severe reduction in ejection fraction 25% with left bundle branch block, goal of care discussed by myself and cardiologist to the patient and family agree to continue with hospice care which was previously initiated by the primary care provider, patient will be discharged with aspirin and PO Lasix for supportive therapy.  Patient was also noted to have multiple decubitus ulcer, wound care eval appreciated, received IV antibiotic for 4 days will be discharged with p.o. doxycycline for 1 week.   consulted, patient got accepted to [facility].  Instructions provided to follow up with primary care physician as outpatient.    Physical exam on the day of discharge:  Physical Exam:  General:  Extremely weak bound to bed mostly, Patient resting comfortably,. Appears as stated age. Calm on 2l nc, audible wheeze improved  Eyes: No conjunctival injection. No scleral icterus.  ENT: hard of Hearing . No discharge from ears. No nasal discharge.   Neck: Supple, trachea midline. No JVD  CVS: RRR.   Lungs:Decreased breath sound bl  ,  Wheezing+  basal crackles. Mild  acute respiratory distress  Abdomen:  Soft, nontender and nondistended.  No organomegaly, mccarty with dark urine  Neuro: AOx3. Moves all extremities. Follows  commands. Responds appropriately   Skin:  No rash or erythema noted  BLE edema 3+ with tenderness  stage 2 buttock and hip. bilat lower legs weeping. and yeast to the folds        Consults:   Consults (From admission, onward)        Status Ordering Provider     Inpatient consult to Cardiology  Once     Provider:  Rodolfo Vasquez MD    Completed ROBY ORTIZ     Inpatient consult to Hospitalist  Once     Provider:  DO Julita Meehan PENNY A.     Inpatient consult to Social Work/Case Management  Once     Provider:  (Not yet assigned)    Acknowledged JULIAN LEO     Pharmacy to dose Vancomycin consult  Once     Provider:  (Not yet assigned)    Acknowledged JULIAN LEO          No new Assessment & Plan notes have been filed under this hospital service since the last note was generated.  Service: Hospital Medicine    Final Active Diagnoses:    Diagnosis Date Noted POA    PRINCIPAL PROBLEM:  CHF exacerbation [I50.9] 10/27/2020 Unknown    NSTEMI (non-ST elevated myocardial infarction) [I21.4]  Yes    Non-traumatic rhabdomyolysis [M62.82] 10/26/2020 Yes    Fall [W19.XXXA] 10/26/2020 Yes    Acute cystitis [N30.00] 10/26/2020 Yes    Venous stasis dermatitis of both lower extremities [I87.2] 09/02/2020 Yes    Essential hypertension [I10] 05/24/2020 Yes    Noncompliance with medication regimen [Z91.14] 12/26/2019 Not Applicable    ILD (interstitial lung disease) [J84.9] 08/30/2019 Yes    Excessive tear production of both lacrimal glands [H04.203] 08/30/2019 Yes    Bilateral lower extremity edema [R60.0] 08/30/2019 Yes    Debility [R53.81] 08/30/2019 Yes    CKD (chronic kidney disease) stage 3, GFR 30-59 ml/min [N18.30] 12/12/2018 Yes     Chronic    Chronic diastolic congestive heart failure [I50.32] 12/12/2018 Yes     Chronic    Sensorineural hearing loss (SNHL) of both ears [H90.3] 12/12/2018 Yes     Chronic    Sepsis(995.91) [A41.9] 08/02/2013 Yes    Complete left  bundle branch block [I44.7] 07/10/2013 Yes    Hypertension [I10]  Yes     Chronic      Problems Resolved During this Admission:       Discharged Condition: critical    Disposition: Hospice/Home    Follow Up:  Follow-up Information     Rachel Millard MD In 1 week.    Specialty: Internal Medicine  Contact information:  [address]  [address]  [facility] [address]  [phone]                 Patient Instructions:      Diet Cardiac     Notify your health care provider if you experience any of the following:  temperature >100.4     Notify your health care provider if you experience any of the following:  persistent nausea and vomiting or diarrhea     Notify your health care provider if you experience any of the following:  persistent dizziness, light-headedness, or visual disturbances     Activity as tolerated       Significant Diagnostic Studies: Labs:   BMP:   Recent Labs   Lab 10/29/20  0401 10/29/20  1908 10/29/20  1909 10/30/20  0412   *  --   --  106     --   --  139   K 3.6  --  3.9 4.1     --   --  101   CO2 26  --   --  29   BUN 20  --   --  17   CREATININE 0.8  --   --  0.8   CALCIUM 8.8  --   --  8.2*   MG 1.7 1.8  --  1.9    and CBC   Recent Labs   Lab 10/29/20  0401 10/30/20  0412   WBC 7.06 6.26   HGB 11.9* 12.1   HCT 35.6* 38.0   * 132*       Pending Diagnostic Studies:     None         Medications:  Reconciled Home Medications:      Medication List      START taking these medications    aspirin 81 MG Chew  Take 1 tablet (81 mg total) by mouth once daily.  Start taking on: October 31, 2020     doxycycline 100 MG Cap  Commonly known as: VIBRAMYCIN  Take 1 capsule (100 mg total) by mouth every 12 (twelve) hours. for 7 days        CHANGE how you take these medications    furosemide 20 MG tablet  Commonly known as: LASIX  Take 2 tablets (40 mg total) by mouth once daily. For leg swelling  What changed: how much to take        CONTINUE taking these medications     ergocalciferol 50,000 unit Cap  Commonly known as: ERGOCALCIFEROL  Take 1 capsule (50,000 Units total) by mouth every 7 days. For vitamin D            Indwelling Lines/Drains at time of discharge:   Lines/Drains/Airways     None                 Time spent on the discharge of patient: 33  minutes  Patient was seen and examined on the date of discharge and determined to be suitable for discharge.         Marga Ragsdale MD  Department of Hospital Medicine  [facility]

## 2020-10-30 NOTE — PROGRESS NOTES
[facility] Medicine  Progress Note    Patient name: Fracisco Pires  MRN: [id number]  Admit Date: 10/26/2020   LOS: 3 days     SUBJECTIVE:     Principal problem: Sepsis    Interval History:   10/29: appears more pleasant, son at bedside,questions answered  Awaiting for hospice dc delayed due to current davin  Possible d/c tomorrow to home hospice  Iv abx switched to Po    10/28:  Patient appeared more awake interval progress in breathing with IV diuresis  Cardiology consult appreciated NSTEMI with heart failure advised medical management with hospice care  Echo showed low EF 25% with elevated central venous pressure grade 1 diastolic dysfunction  Plan discussed with daughter and patient,  consulted for home hospice  Awaiting for placement     10/27 Pt feels tired , wanted to sleep, but awake oriented to name ,placed not time but knws her   Lactate trending up ,audible wheeze on gentle hydration  Troponin improving, denies CP  Consult cardiology for optimize CHF mx with sepsis  Wound care eval appreciated for decubiti    Scheduled Meds:   aspirin  81 mg Oral Daily    doxycycline  100 mg Oral Q12H    enoxaparin  40 mg Subcutaneous Q24H    furosemide (LASIX) IV  40 mg Intravenous Daily    polyethylene glycol  17 g Oral Daily     Continuous Infusions:    PRN Meds:acetaminophen, calcium chloride IVPB, calcium chloride IVPB, calcium chloride IVPB, magnesium oxide, magnesium oxide, magnesium oxide, magnesium sulfate IVPB, magnesium sulfate IVPB, magnesium sulfate IVPB, magnesium sulfate IVPB, melatonin, ondansetron, potassium chloride in water, potassium chloride in water, potassium chloride in water, potassium chloride in water, potassium chloride, potassium chloride, potassium chloride, potassium chloride, promethazine (PHENERGAN) IVPB, sodium chloride 0.9%, sodium phosphate IVPB, sodium phosphate IVPB, sodium phosphate IVPB, sodium phosphate IVPB, sodium phosphate IVPB,  traMADoL, Pharmacy to dose Vancomycin consult **AND** vancomycin - pharmacy to dose    Review of patient's allergies indicates:   Allergen Reactions    No known drug allergies        Review of Systems: As per interval history    OBJECTIVE:     Vital Signs (Most Recent)  Temp: 97.8 °F (36.6 °C) (10/29/20 2046)  Pulse: 88 (10/29/20 2046)  Resp: 16 (10/29/20 2046)  BP: (!) 103/54 (10/29/20 2046)  SpO2: 99 % (10/29/20 2046)    Vital Signs Range (Last 24H):  Temp:  [97.8 °F (36.6 °C)-98.9 °F (37.2 °C)]   Pulse:  [73-96]   Resp:  [16-19]   BP: (103-124)/(54-86)   SpO2:  [93 %-99 %]     I & O (Last 24H):    Intake/Output Summary (Last 24 hours) at 10/29/2020 2385  Last data filed at 10/29/2020 1415  Gross per 24 hour   Intake --   Output 1800 ml   Net -1800 ml       Physical Exam:  General: Patient resting comfortably, fatigue+. Appears as stated age. Calm on 2l nc, audible wheeze  Eyes: No conjunctival injection. No scleral icterus.  ENT: heard of Hearing . No discharge from ears. No nasal discharge.   Neck: Supple, trachea midline. No JVD  CVS: RRR.   Lungs:Decreased breath sound bl  ,  Wheezing+  basal crackles. Mild  acute respiratory distress  Abdomen:  Soft, nontender and nondistended.  No organomegaly, mccarty with dark urine  Neuro: AOx3. Moves all extremities. Follows commands. Responds appropriately   Skin:  No rash or erythema noted  BLE edema 3+ with tenderness  stage 2 buttock and hip. bilat lower legs weeping. and yeast to the folds   Laboratory:  CBC:   Recent Labs   Lab 10/29/20  0401   WBC 7.06   RBC 3.51*   HGB 11.9*   HCT 35.6*   *   *   MCH 33.9*   MCHC 33.4     CMP:   Recent Labs   Lab 10/29/20  0401 10/29/20  1909   *  --    CALCIUM 8.8  --    ALBUMIN 2.4*  --    PROT 6.1  --      --    K 3.6 3.9   CO2 26  --      --    BUN 20  --    CREATININE 0.8  --    ALKPHOS 55  --    ALT 26  --    AST 50*  --    BILITOT 1.9*  --        Diagnostic Results:  US: Reviewed    Cholelithiasis.    ASSESSMENT/PLAN:         Active Hospital Problems    Diagnosis  POA    *Sepsis(995.91) [A41.9]  Yes     Dx updated per 2019 IMO Load      CHF exacerbation [I50.9]  Unknown     Possibly 2/2 recent MI      NSTEMI (non-ST elevated myocardial infarction) [I21.4]  Unknown    Non-traumatic rhabdomyolysis [M62.82]  Yes    Fall [W19.XXXA]  Yes    Acute cystitis [N30.00]  Yes    Venous stasis dermatitis of both lower extremities [I87.2]  Yes    Essential hypertension [I10]  Yes    Noncompliance with medication regimen [Z91.14]  Not Applicable    ILD (interstitial lung disease) [J84.9]  Yes    Excessive tear production of both lacrimal glands [H04.203]  Yes    Bilateral lower extremity edema [R60.0]  Yes    Debility [R53.81]  Yes    CKD (chronic kidney disease) stage 3, GFR 30-59 ml/min [N18.30]  Yes     Chronic    Chronic diastolic congestive heart failure [I50.32]  Yes     Chronic    Sensorineural hearing loss (SNHL) of both ears [H90.3]  Yes     Chronic    Complete left bundle branch block [I44.7]  Yes    Hypertension [I10]  Yes     Chronic      Resolved Hospital Problems   No resolved problems to display.         Plan:   C/w broad spectrum abx vanco zosyn  D/C Gentle hydration  BNP remains elevated  F/u cardiology consult   Continue with aspirin and lasix iv  CK trending down   Echo showedEF 25% with elevated central venous pressure grade 1 diastolic dysfunction  Mild to moderate MR  C/W pt/ot IP    VTE Risk Mitigation (From admission, onward)         Ordered     enoxaparin injection 40 mg  Every 24 hours      10/26/20 1932     IP VTE HIGH RISK PATIENT  Once      10/26/20 1932     Place sequential compression device  Until discontinued      10/26/20 1932                  Department Hospital Medicine  [facility]  Marga Ragsdale MD  Date of service: 10/29/2020

## 2020-10-30 NOTE — PLAN OF CARE
Important Message from Medicare was sign, explained and given to patient/caregiver on 10/30/2020 at 10:14am     answered all questions.

## 2020-10-30 NOTE — HOSPITAL COURSE
Patient initially admitted to telemetry for sepsis with lactic acidosis, noted to have NSTEMI, with regional wall motion abnormalities on echocardiogram in LAD territory with severe reduction in ejection fraction 25% with left bundle branch block, goal of care discussed by myself and cardiologist to the patient and family agree to continue with hospice care which was previously initiated by the primary care provider, patient will be discharged with aspirin and PO Lasix for supportive therapy.  Patient was also noted to have multiple decubitus ulcer, wound care eval appreciated, received IV antibiotic for 4 days will be discharged with p.o. doxycycline for 1 week.   consulted, patient got accepted to [facility].  Instructions provided to follow up with primary care physician as outpatient.    Physical exam on the day of discharge:  Physical Exam:  General:  Extremely weak bound to bed mostly, Patient resting comfortably,. Appears as stated age. Calm on 2l nc, audible wheeze improved  Eyes: No conjunctival injection. No scleral icterus.  ENT: hard of Hearing . No discharge from ears. No nasal discharge.   Neck: Supple, trachea midline. No JVD  CVS: RRR.   Lungs:Decreased breath sound bl  ,  Wheezing+  basal crackles. Mild  acute respiratory distress  Abdomen:  Soft, nontender and nondistended.  No organomegaly, mccarty with dark urine  Neuro: AOx3. Moves all extremities. Follows commands. Responds appropriately   Skin:  No rash or erythema noted  BLE edema 3+ with tenderness  stage 2 buttock and hip. bilat lower legs weeping. and yeast to the folds

## 2020-10-30 NOTE — CARE UPDATE
Patient plan is to discharge home with [facility] when power is restored to the home. Spoke with Margarita at [facility] [phone] who informed CM that as of now power is no working. Weekend CM notified.

## 2020-10-31 LAB
BACTERIA BLD CULT: NORMAL
BACTERIA BLD CULT: NORMAL

## 2020-11-02 NOTE — PLAN OF CARE
11/02/20 1323   Final Note   Assessment Type Final Discharge Note   Anticipated Discharge Disposition HospiceHome   Post-Acute Status   Post-Acute Authorization Hospice   Hospice Status Set-up Complete   Discharge Delays None known at this time     Discharge orders reviewed.  Patient discharged home on 10/30/2020 with [facility].    Anabell Goddard LCSW  Case Management  Ext. [phone]

## 2023-07-19 NOTE — TELEPHONE ENCOUNTER
----- Message from Deisi Condon sent at 9/25/2017  1:56 PM CDT -----   (Gabriele)requesting to speak with nurse concerning patient/stated patient is unable to get to appointments due to has trouble getting around/needs advice/please call back at [phone] to advise.  
Patient canceled appointment dated today with Bianca Owusu, requesting to reschedule. Appointment scheduled for 10-2-17. Patient agreed to appointment date and time.   
[phone]